# Patient Record
Sex: FEMALE | Race: AMERICAN INDIAN OR ALASKA NATIVE | NOT HISPANIC OR LATINO | Employment: FULL TIME | URBAN - METROPOLITAN AREA
[De-identification: names, ages, dates, MRNs, and addresses within clinical notes are randomized per-mention and may not be internally consistent; named-entity substitution may affect disease eponyms.]

---

## 2018-12-12 ENCOUNTER — OFFICE VISIT (OUTPATIENT)
Dept: OBSTETRICS AND GYNECOLOGY | Facility: CLINIC | Age: 28
End: 2018-12-12
Payer: COMMERCIAL

## 2018-12-12 VITALS
HEIGHT: 61 IN | HEART RATE: 88 BPM | DIASTOLIC BLOOD PRESSURE: 80 MMHG | WEIGHT: 148 LBS | SYSTOLIC BLOOD PRESSURE: 120 MMHG | RESPIRATION RATE: 10 BRPM | BODY MASS INDEX: 27.94 KG/M2

## 2018-12-12 DIAGNOSIS — Z12.4 CERVICAL CANCER SCREENING: ICD-10-CM

## 2018-12-12 DIAGNOSIS — Z01.419 WELL WOMAN EXAM WITH ROUTINE GYNECOLOGICAL EXAM: Primary | ICD-10-CM

## 2018-12-12 DIAGNOSIS — Z12.39 SCREENING FOR BREAST CANCER: ICD-10-CM

## 2018-12-12 PROCEDURE — 99385 PREV VISIT NEW AGE 18-39: CPT | Mod: S$GLB,,, | Performed by: OBSTETRICS & GYNECOLOGY

## 2018-12-12 PROCEDURE — 87624 HPV HI-RISK TYP POOLED RSLT: CPT

## 2018-12-12 PROCEDURE — 88175 CYTOPATH C/V AUTO FLUID REDO: CPT

## 2018-12-12 PROCEDURE — 99999 PR PBB SHADOW E&M-EST. PATIENT-LVL III: CPT | Mod: PBBFAC,,, | Performed by: OBSTETRICS & GYNECOLOGY

## 2018-12-12 NOTE — PROGRESS NOTES
Subjective:    Patient ID: Keisha Mena is a 28 y.o. y.o. female.     Chief Complaint: Annual Well Woman Exam     History of Present Illness:  Keisha presents today for Annual Well Woman exam. She describes her menses as regular every month without intermenstrual spotting.She denies pelvic pain.  She denies breast tenderness, masses, nipple discharge. She denies difficulty with urination or bowel movements. S She is sexually active. Contraception is by no method.    The following portions of the patient's history were reviewed and updated as appropriate: allergies, current medications, past family history, past medical history, past social history, past surgical history and problem list.      Menstrual History:   Patient's last menstrual period was 2018..     OB History      Para Term  AB Living    0 0 0 0 0 0    SAB TAB Ectopic Multiple Live Births    0 0 0 0            The following portions of the patient's history were reviewed and updated as appropriate: allergies, current medications, past family history, past medical history, past social history, past surgical history and problem list.        ROS:     Review of Systems   Constitutional: Negative for activity change, appetite change, chills, diaphoresis, fatigue, fever and unexpected weight change.   HENT: Negative for mouth sores and tinnitus.    Eyes: Negative for discharge and visual disturbance.   Respiratory: Negative for cough, shortness of breath and wheezing.    Cardiovascular: Negative for chest pain, palpitations and leg swelling.   Gastrointestinal: Negative for abdominal pain, bloating, blood in stool, constipation, diarrhea, nausea and vomiting.   Endocrine: Negative for diabetes, hair loss, hot flashes, hyperthyroidism and hypothyroidism.   Genitourinary: Negative for dyspareunia, dysuria, flank pain, frequency, genital sores, hematuria, menorrhagia, menstrual problem, pelvic pain, urgency, vaginal bleeding, vaginal discharge,  "vaginal pain, dysmenorrhea, urinary incontinence, postcoital bleeding and vaginal odor.   Musculoskeletal: Negative for back pain, joint swelling and myalgias.   Neurological: Negative for seizures, syncope, numbness and headaches.   Hematological: Negative for adenopathy. Does not bruise/bleed easily.   Psychiatric/Behavioral: Negative for depression and sleep disturbance. The patient is not nervous/anxious.    Breast: Negative for mass, mastodynia, nipple discharge and skin changes      Objective:    Vital Signs:  Vitals:    12/12/18 1130   BP: 120/80   Pulse: 88   Resp: 10   Weight: 67.1 kg (148 lb)   Height: 5' 1" (1.549 m)         Physical Exam:  General:  alert, cooperative, appears stated age   Skin:  Skin color, texture, turgor normal. No rashes or lesions   HEENT:  conjunctivae/corneas clear. PERRL.   Neck: supple, trachea midline, no adenopathy or thyromegally   Respiratory:  clear to auscultation bilaterally   Heart:  regular rate and rhythm, S1, S2 normal, no murmur, click, rub or gallop   Breasts:  no discharge, erythema, or tenderness   Abdomen:  normal findings: bowel sounds normal, no masses palpable, no organomegaly and soft, non-tender   Pelvis: External genitalia: normal general appearance  Urinary system: urethral meatus normal, bladder nontender  Vaginal: normal mucosa without prolapse or lesions  Cervix: normal appearance  Uterus: normal size, shape, position  Adnexa: normal size, nontender bilaterally   Extremities: Normal ROM; no edema, no cyanosis   Neurologial: Normal strength and tone. No focal numbness or weakness. Reflexes 2+ and equal.   Psychiatric: normal mood, speech, dress, and thought processes         Assessment:       Healthy female exam.     1. Well woman exam with routine gynecological exam    2. Cervical cancer screening    3. Screening for breast cancer          Plan:       Thin prep Pap smear.    HPV cotesting  RTC in 1 year    COUNSELING:  Keisha was counseled on STD " pevention, use and side-effects of various contraceptive measures, A.C.O.G. Pap guidelines and recommendations for yearly pelvic exams in addition to recommendations for monthly self breast exams; to see her PCP for other health maintenance.

## 2018-12-18 LAB
HPV HR 12 DNA CVX QL NAA+PROBE: NEGATIVE
HPV16 AG SPEC QL: NEGATIVE
HPV18 DNA SPEC QL NAA+PROBE: NEGATIVE

## 2019-03-08 ENCOUNTER — OFFICE VISIT (OUTPATIENT)
Dept: INTERNAL MEDICINE | Facility: CLINIC | Age: 29
End: 2019-03-08
Payer: COMMERCIAL

## 2019-03-08 VITALS
DIASTOLIC BLOOD PRESSURE: 70 MMHG | HEART RATE: 72 BPM | SYSTOLIC BLOOD PRESSURE: 108 MMHG | WEIGHT: 148 LBS | BODY MASS INDEX: 27.94 KG/M2 | RESPIRATION RATE: 16 BRPM | HEIGHT: 61 IN

## 2019-03-08 DIAGNOSIS — Z76.89 ENCOUNTER TO ESTABLISH CARE: Primary | ICD-10-CM

## 2019-03-08 DIAGNOSIS — G89.29 CHRONIC LEFT-SIDED LOW BACK PAIN WITH LEFT-SIDED SCIATICA: ICD-10-CM

## 2019-03-08 DIAGNOSIS — M54.42 CHRONIC LEFT-SIDED LOW BACK PAIN WITH LEFT-SIDED SCIATICA: ICD-10-CM

## 2019-03-08 PROCEDURE — 3008F BODY MASS INDEX DOCD: CPT | Mod: CPTII,S$GLB,, | Performed by: INTERNAL MEDICINE

## 2019-03-08 PROCEDURE — 99999 PR PBB SHADOW E&M-EST. PATIENT-LVL III: ICD-10-PCS | Mod: PBBFAC,,, | Performed by: INTERNAL MEDICINE

## 2019-03-08 PROCEDURE — 99203 PR OFFICE/OUTPT VISIT, NEW, LEVL III, 30-44 MIN: ICD-10-PCS | Mod: S$GLB,,, | Performed by: INTERNAL MEDICINE

## 2019-03-08 PROCEDURE — 99203 OFFICE O/P NEW LOW 30 MIN: CPT | Mod: S$GLB,,, | Performed by: INTERNAL MEDICINE

## 2019-03-08 PROCEDURE — 3008F PR BODY MASS INDEX (BMI) DOCUMENTED: ICD-10-PCS | Mod: CPTII,S$GLB,, | Performed by: INTERNAL MEDICINE

## 2019-03-08 PROCEDURE — 99999 PR PBB SHADOW E&M-EST. PATIENT-LVL III: CPT | Mod: PBBFAC,,, | Performed by: INTERNAL MEDICINE

## 2019-03-08 NOTE — PROGRESS NOTES
Subjective:       Patient ID: Keisha Mena is a 28 y.o. female.    Chief Complaint: Establish Care and Back Pain      HPI:  Patient is new to clinic and presents to establish care. She has not seen a PCP in 4-5 years. She is actively trying to get pregnant; taking her prenatal vitamins daily    She reports [id number] she fell into a foam pit at [facility] with her nephew. She hyperextended her back. She is having issues with left sided sciatica. Had xrays but doesn't remember the results. She is also having right shoulder pain. Uses aleve PRN, biofreeze and heat/ice PRN. Has never done formal physical therapy    Tobacco use: never  EtOH use: denies  Illicit drugs: denies    History reviewed. No pertinent past medical history.    Family History   Problem Relation Age of Onset    Hypertension Mother     Alcohol abuse Father         history of s/p liver transplant    Breast cancer Neg Hx     Colon cancer Neg Hx     Ovarian cancer Neg Hx        Social History     Socioeconomic History    Marital status:      Spouse name: Not on file    Number of children: Not on file    Years of education: Not on file    Highest education level: Not on file   Social Needs    Financial resource strain: Not on file    Food insecurity - worry: Not on file    Food insecurity - inability: Not on file    Transportation needs - medical: Not on file    Transportation needs - non-medical: Not on file   Occupational History    Not on file   Tobacco Use    Smoking status: Never Smoker    Smokeless tobacco: Never Used   Substance and Sexual Activity    Alcohol use: No     Frequency: Never    Drug use: No    Sexual activity: Yes     Partners: Male     Comment:    Other Topics Concern    Not on file   Social History Narrative    Not on file       Review of Systems   Constitutional: Negative for activity change, fatigue, fever and unexpected weight change.   HENT: Negative for congestion, ear pain, hearing loss,  rhinorrhea and sore throat.    Eyes: Negative for redness and visual disturbance.   Respiratory: Negative for cough, shortness of breath and wheezing.    Cardiovascular: Negative for chest pain, palpitations and leg swelling.   Gastrointestinal: Negative for abdominal pain, constipation, diarrhea, nausea and vomiting.   Genitourinary: Negative for dysuria, frequency and urgency.   Musculoskeletal: Positive for arthralgias (right shoulder intermittently) and back pain (with sciatica). Negative for joint swelling and neck pain.   Skin: Negative for color change, rash and wound.   Neurological: Negative for dizziness, tremors, weakness, light-headedness and headaches.         Objective:      Physical Exam   Constitutional: She is oriented to person, place, and time. She appears well-developed and well-nourished. No distress.   HENT:   Head: Normocephalic and atraumatic.   Right Ear: External ear normal.   Left Ear: External ear normal.   Eyes: Conjunctivae and EOM are normal. Pupils are equal, round, and reactive to light. Right eye exhibits no discharge. Left eye exhibits no discharge.   Neck: Neck supple. No tracheal deviation present.   Cardiovascular: Normal rate and regular rhythm.   No murmur heard.  Pulmonary/Chest: Effort normal and breath sounds normal. No respiratory distress. She has no wheezes.   Abdominal: Soft. Bowel sounds are normal. She exhibits no distension. There is no tenderness.   Musculoskeletal: She exhibits no tenderness.   Pain better with forward flexion worse with extension of low back   Neurological: She is alert and oriented to person, place, and time. No cranial nerve deficit.   Skin: Skin is warm and dry.   Psychiatric: She has a normal mood and affect. Her behavior is normal.   Vitals reviewed.      Assessment:       1. Encounter to establish care    2. Chronic left-sided low back pain with left-sided sciatica        Plan:       Keisha was seen today for establish care and back  pain.    Diagnoses and all orders for this visit:    Encounter to establish care  -     CBC auto differential; Future  -     Comprehensive metabolic panel; Future  -     TSH; Future  -     Lipid panel; Future  -     T4, free; Future  Problem list reviewed and updated  Due for routine screening labs  Include TSH as she is actively trying to get pregnant    Chronic left-sided low back pain with left-sided sciatica  -     Ambulatory Referral to Physical/Occupational Therapy  Start PT  Can continue PRN NSAIDs; if she does get pregnant Tylenol only  Discussed we could trial medications like gabapentin but category C in pregnancy and if we could treat this without chronic medications that would be ideal  Heat PRN  biofreeze PRN       Health Maintenance:  -CRC: not due  -PAP: Dr. Junior  -MMG: not due  -Bone Density: not due  -tobacco  -Vaccines  Flu: declines    RTC 1 year and PRN

## 2019-03-21 ENCOUNTER — LAB VISIT (OUTPATIENT)
Dept: LAB | Facility: HOSPITAL | Age: 29
End: 2019-03-21
Attending: INTERNAL MEDICINE
Payer: COMMERCIAL

## 2019-03-21 DIAGNOSIS — Z76.89 ENCOUNTER TO ESTABLISH CARE: ICD-10-CM

## 2019-03-21 LAB
ALBUMIN SERPL BCP-MCNC: 4.1 G/DL
ALP SERPL-CCNC: 50 U/L
ALT SERPL W/O P-5'-P-CCNC: 10 U/L
ANION GAP SERPL CALC-SCNC: 6 MMOL/L
AST SERPL-CCNC: 17 U/L
BASOPHILS # BLD AUTO: 0.02 K/UL
BASOPHILS NFR BLD: 0.4 %
BILIRUB SERPL-MCNC: 0.8 MG/DL
BUN SERPL-MCNC: 13 MG/DL
CALCIUM SERPL-MCNC: 9.4 MG/DL
CHLORIDE SERPL-SCNC: 108 MMOL/L
CHOLEST SERPL-MCNC: 149 MG/DL
CHOLEST/HDLC SERPL: 2.8 {RATIO}
CO2 SERPL-SCNC: 23 MMOL/L
CREAT SERPL-MCNC: 0.9 MG/DL
DIFFERENTIAL METHOD: ABNORMAL
EOSINOPHIL # BLD AUTO: 0.1 K/UL
EOSINOPHIL NFR BLD: 1 %
ERYTHROCYTE [DISTWIDTH] IN BLOOD BY AUTOMATED COUNT: 12 %
EST. GFR  (AFRICAN AMERICAN): >60 ML/MIN/1.73 M^2
EST. GFR  (NON AFRICAN AMERICAN): >60 ML/MIN/1.73 M^2
GLUCOSE SERPL-MCNC: 88 MG/DL
HCT VFR BLD AUTO: 38.5 %
HDLC SERPL-MCNC: 54 MG/DL
HDLC SERPL: 36.2 %
HGB BLD-MCNC: 13.3 G/DL
LDLC SERPL CALC-MCNC: 83.2 MG/DL
LYMPHOCYTES # BLD AUTO: 2.3 K/UL
LYMPHOCYTES NFR BLD: 45.5 %
MCH RBC QN AUTO: 31.7 PG
MCHC RBC AUTO-ENTMCNC: 34.5 G/DL
MCV RBC AUTO: 92 FL
MONOCYTES # BLD AUTO: 0.3 K/UL
MONOCYTES NFR BLD: 6.5 %
NEUTROPHILS # BLD AUTO: 2.4 K/UL
NEUTROPHILS NFR BLD: 46.6 %
NONHDLC SERPL-MCNC: 95 MG/DL
PLATELET # BLD AUTO: 172 K/UL
PMV BLD AUTO: 10.9 FL
POTASSIUM SERPL-SCNC: 3.7 MMOL/L
PROT SERPL-MCNC: 7.2 G/DL
RBC # BLD AUTO: 4.19 M/UL
SODIUM SERPL-SCNC: 137 MMOL/L
T4 FREE SERPL-MCNC: 1.08 NG/DL
TRIGL SERPL-MCNC: 59 MG/DL
TSH SERPL DL<=0.005 MIU/L-ACNC: 1.97 UIU/ML
WBC # BLD AUTO: 5.06 K/UL

## 2019-03-21 PROCEDURE — 84439 ASSAY OF FREE THYROXINE: CPT

## 2019-03-21 PROCEDURE — 80061 LIPID PANEL: CPT

## 2019-03-21 PROCEDURE — 36415 COLL VENOUS BLD VENIPUNCTURE: CPT

## 2019-03-21 PROCEDURE — 80053 COMPREHEN METABOLIC PANEL: CPT

## 2019-03-21 PROCEDURE — 85025 COMPLETE CBC W/AUTO DIFF WBC: CPT

## 2019-03-21 PROCEDURE — 84443 ASSAY THYROID STIM HORMONE: CPT

## 2019-04-04 ENCOUNTER — LAB VISIT (OUTPATIENT)
Dept: LAB | Facility: HOSPITAL | Age: 29
End: 2019-04-04
Attending: OBSTETRICS & GYNECOLOGY
Payer: COMMERCIAL

## 2019-04-04 ENCOUNTER — OFFICE VISIT (OUTPATIENT)
Dept: OBSTETRICS AND GYNECOLOGY | Facility: CLINIC | Age: 29
End: 2019-04-04
Payer: COMMERCIAL

## 2019-04-04 VITALS
HEIGHT: 61 IN | SYSTOLIC BLOOD PRESSURE: 120 MMHG | RESPIRATION RATE: 10 BRPM | HEART RATE: 80 BPM | WEIGHT: 149 LBS | DIASTOLIC BLOOD PRESSURE: 78 MMHG | BODY MASS INDEX: 28.13 KG/M2

## 2019-04-04 DIAGNOSIS — Z11.3 SCREEN FOR STD (SEXUALLY TRANSMITTED DISEASE): ICD-10-CM

## 2019-04-04 DIAGNOSIS — N91.2 AMENORRHEA: Primary | ICD-10-CM

## 2019-04-04 DIAGNOSIS — Z32.01 POSITIVE PREGNANCY TEST: ICD-10-CM

## 2019-04-04 LAB
ABO + RH BLD: NORMAL
ALBUMIN SERPL BCP-MCNC: 4.1 G/DL (ref 3.5–5.2)
ALP SERPL-CCNC: 46 U/L (ref 55–135)
ALT SERPL W/O P-5'-P-CCNC: 9 U/L (ref 10–44)
ANION GAP SERPL CALC-SCNC: 8 MMOL/L (ref 8–16)
AST SERPL-CCNC: 18 U/L (ref 10–40)
BASOPHILS # BLD AUTO: 0.02 K/UL (ref 0–0.2)
BASOPHILS NFR BLD: 0.5 % (ref 0–1.9)
BILIRUB SERPL-MCNC: 0.2 MG/DL (ref 0.1–1)
BLD GP AB SCN CELLS X3 SERPL QL: NORMAL
BUN SERPL-MCNC: 14 MG/DL (ref 6–20)
CALCIUM SERPL-MCNC: 9.4 MG/DL (ref 8.7–10.5)
CHLORIDE SERPL-SCNC: 104 MMOL/L (ref 95–110)
CO2 SERPL-SCNC: 24 MMOL/L (ref 23–29)
CREAT SERPL-MCNC: 0.8 MG/DL (ref 0.5–1.4)
DIFFERENTIAL METHOD: ABNORMAL
EOSINOPHIL # BLD AUTO: 0.1 K/UL (ref 0–0.5)
EOSINOPHIL NFR BLD: 1.5 % (ref 0–8)
ERYTHROCYTE [DISTWIDTH] IN BLOOD BY AUTOMATED COUNT: 11.9 % (ref 11.5–14.5)
EST. GFR  (AFRICAN AMERICAN): >60 ML/MIN/1.73 M^2
EST. GFR  (NON AFRICAN AMERICAN): >60 ML/MIN/1.73 M^2
GLUCOSE SERPL-MCNC: 81 MG/DL (ref 70–110)
HCG INTACT+B SERPL-ACNC: NORMAL MIU/ML
HCT VFR BLD AUTO: 36.9 % (ref 37–48.5)
HGB BLD-MCNC: 12.6 G/DL (ref 12–16)
LYMPHOCYTES # BLD AUTO: 1.6 K/UL (ref 1–4.8)
LYMPHOCYTES NFR BLD: 39.3 % (ref 18–48)
MCH RBC QN AUTO: 31.4 PG (ref 27–31)
MCHC RBC AUTO-ENTMCNC: 34.1 G/DL (ref 32–36)
MCV RBC AUTO: 92 FL (ref 82–98)
MONOCYTES # BLD AUTO: 0.5 K/UL (ref 0.3–1)
MONOCYTES NFR BLD: 11.9 % (ref 4–15)
NEUTROPHILS # BLD AUTO: 1.9 K/UL (ref 1.8–7.7)
NEUTROPHILS NFR BLD: 46.8 % (ref 38–73)
PLATELET # BLD AUTO: 171 K/UL (ref 150–350)
PMV BLD AUTO: 10.6 FL (ref 9.2–12.9)
POTASSIUM SERPL-SCNC: 3.8 MMOL/L (ref 3.5–5.1)
PROT SERPL-MCNC: 7.5 G/DL (ref 6–8.4)
RBC # BLD AUTO: 4.01 M/UL (ref 4–5.4)
SODIUM SERPL-SCNC: 136 MMOL/L (ref 136–145)
WBC # BLD AUTO: 4.05 K/UL (ref 3.9–12.7)

## 2019-04-04 PROCEDURE — 36415 COLL VENOUS BLD VENIPUNCTURE: CPT

## 2019-04-04 PROCEDURE — 86703 HIV-1/HIV-2 1 RESULT ANTBDY: CPT

## 2019-04-04 PROCEDURE — 81220 CFTR GENE COM VARIANTS: CPT

## 2019-04-04 PROCEDURE — 84702 CHORIONIC GONADOTROPIN TEST: CPT

## 2019-04-04 PROCEDURE — 99214 PR OFFICE/OUTPT VISIT, EST, LEVL IV, 30-39 MIN: ICD-10-PCS | Mod: 25,S$GLB,, | Performed by: OBSTETRICS & GYNECOLOGY

## 2019-04-04 PROCEDURE — 84144 ASSAY OF PROGESTERONE: CPT

## 2019-04-04 PROCEDURE — 99999 PR PBB SHADOW E&M-EST. PATIENT-LVL III: CPT | Mod: PBBFAC,,, | Performed by: OBSTETRICS & GYNECOLOGY

## 2019-04-04 PROCEDURE — 99999 PR PBB SHADOW E&M-EST. PATIENT-LVL III: ICD-10-PCS | Mod: PBBFAC,,, | Performed by: OBSTETRICS & GYNECOLOGY

## 2019-04-04 PROCEDURE — 3008F PR BODY MASS INDEX (BMI) DOCUMENTED: ICD-10-PCS | Mod: CPTII,S$GLB,, | Performed by: OBSTETRICS & GYNECOLOGY

## 2019-04-04 PROCEDURE — 87340 HEPATITIS B SURFACE AG IA: CPT

## 2019-04-04 PROCEDURE — 3008F BODY MASS INDEX DOCD: CPT | Mod: CPTII,S$GLB,, | Performed by: OBSTETRICS & GYNECOLOGY

## 2019-04-04 PROCEDURE — 86901 BLOOD TYPING SEROLOGIC RH(D): CPT

## 2019-04-04 PROCEDURE — 99214 OFFICE O/P EST MOD 30 MIN: CPT | Mod: 25,S$GLB,, | Performed by: OBSTETRICS & GYNECOLOGY

## 2019-04-04 PROCEDURE — 80053 COMPREHEN METABOLIC PANEL: CPT

## 2019-04-04 PROCEDURE — 87491 CHLMYD TRACH DNA AMP PROBE: CPT

## 2019-04-04 PROCEDURE — 86592 SYPHILIS TEST NON-TREP QUAL: CPT

## 2019-04-04 PROCEDURE — 85025 COMPLETE CBC W/AUTO DIFF WBC: CPT

## 2019-04-04 PROCEDURE — 86762 RUBELLA ANTIBODY: CPT

## 2019-04-04 NOTE — PROGRESS NOTES
Subjective:   Patient ID: Keisha Mena is a 28 y.o. y.o. female.     Chief Complaint: Missed Menses       History of Present Illness:    Keisha presents today complaining of amenorrhea. Patient's last menstrual period was 02/20/2019.. Prior menstrual cycles have been regular. She reports positive home pregnancy test. UPT is positive.     This is the patient's first pregnancy. Reports no n/v. No cramping or bleeding.       History reviewed. No pertinent past medical history.  History reviewed. No pertinent surgical history.  Social History     Socioeconomic History    Marital status:      Spouse name: Not on file    Number of children: Not on file    Years of education: Not on file    Highest education level: Not on file   Occupational History    Not on file   Social Needs    Financial resource strain: Not on file    Food insecurity:     Worry: Not on file     Inability: Not on file    Transportation needs:     Medical: Not on file     Non-medical: Not on file   Tobacco Use    Smoking status: Never Smoker    Smokeless tobacco: Never Used   Substance and Sexual Activity    Alcohol use: No     Frequency: Never    Drug use: No    Sexual activity: Yes     Partners: Male     Comment:    Lifestyle    Physical activity:     Days per week: Not on file     Minutes per session: Not on file    Stress: Not on file   Relationships    Social connections:     Talks on phone: Not on file     Gets together: Not on file     Attends [employer] service: Not on file     Active member of club or organization: Not on file     Attends meetings of clubs or organizations: Not on file     Relationship status: Not on file   Other Topics Concern    Not on file   Social History Narrative    Not on file     Family History   Problem Relation Age of Onset    Hypertension Mother     Alcohol abuse Father         history of s/p liver transplant    Breast cancer Neg Hx     Colon cancer Neg Hx     Ovarian cancer Neg Hx       OB History    Para Term  AB Living   0 0 0 0 0 0   SAB TAB Ectopic Multiple Live Births   0 0 0 0           ROS:   Constitutional: Negative for chills, fever and weight loss.   HENT: Negative for congestion, ear discharge, hearing loss and nosebleeds.   Eyes: Negative for blurred vision and double vision.   Respiratory: Negative for cough, hemoptysis, sputum production and shortness of breath.   Cardiovascular: Negative for chest pain, palpitations and orthopnea.   Gastrointestinal: Negative for abdominal pain, heartburn, nausea and vomiting.   Genitourinary: Negative for dysuria, frequency, hematuria and urgency.   Musculoskeletal: Negative for back pain, myalgias and neck pain.   Skin: Negative for itching and rash.   Neurological: Negative for dizziness, tingling, tremors and headaches.   Endo/Heme/Allergies: Negative for environmental allergies and polydipsia. Does not bruise/bleed easily.   Psychiatric/Behavioral: Negative for depression, hallucinations and substance abuse. The patient is not nervous/anxious.       Objective:   Vital Signs:  Vitals:    19 1651   BP: 120/78   Pulse: 80   Resp: 10         Physical Exam:  General:  alert, cooperative, appears stated age   Skin:  Skin color, texture, turgor normal. No rashes or lesions   HEENT:  conjunctivae/corneas clear. PERRL.   Neck: supple, trachea midline, no adenopathy or thyromegally   Respiratory:  clear to auscultation bilaterally   Heart:  regular rate and rhythm, S1, S2 normal, no murmur, click, rub or gallop   Breasts:  no discharge, erythema, or tenderness   Abdomen:  normal findings: bowel sounds normal, no masses palpable, no organomegaly and soft, non-tender   Pelvis: External genitalia: normal general appearance  Urinary system: urethral meatus normal, bladder nontender  Vaginal: normal mucosa without prolapse or lesions  Cervix: normal appearance  Uterus: normal size, shape, position  Adnexa: normal size, nontender  bilaterally   Extremities: Normal ROM; no edema, no cyanosis   Neurologial: Normal strength and tone. No focal numbness or weakness. Reflexes 2+ and equal.   Psychiatric: normal mood, speech, dress, and thought processes     Assessment:      1. Amenorrhea    2. Screen for STD (sexually transmitted disease)    3. Positive pregnancy test          Plan:      Amenorrhea  -     US OB/GYN Procedure ([facility]) - Extended List - Future; Future    Screen for STD (sexually transmitted disease)  -     C. trachomatis/N. gonorrhoeae by AMP DNA    Positive pregnancy test  -     Comprehensive metabolic panel; Future; Expected date: 04/04/2019  -     hCG, quantitative; Standing; Expected date: 04/04/2019  -     Progesterone; Standing; Expected date: 04/04/2019  -     Type & Screen - Ob Profile; Future; Expected date: 04/04/2019  -     CBC auto differential; Future; Expected date: 04/04/2019  -     HIV 1/2 Ag/Ab (4th Gen); Future; Expected date: 04/04/2019  -     Hepatitis B surface antigen; Future; Expected date: 04/04/2019  -     Rubella antibody, IgG; Future; Expected date: 04/04/2019  -     RPR; Future; Expected date: 04/04/2019  -     Cystic Fibrosis Mutation Panel; Future; Expected date: 04/04/2019      Dating scan ordered  GC/CT  Labs  PNV  RTC in 4 weeks    Patient was counseled today on proper weight gain based on the [place] [employer]'s recommendations based on her pre-pregnancy weight. Discussed foods to avoid in pregnancy (i.e. sushi, fish that are high in mercury, deli meat, and unpasteurized cheeses). Discussed prenatal vitamin options (i.e. stool softener, DHA). She was also counseled on safe, healthy behavior as well as medications safe in pregnancy.

## 2019-04-05 LAB
C TRACH DNA SPEC QL NAA+PROBE: NOT DETECTED
HBV SURFACE AG SERPL QL IA: NEGATIVE
HIV 1+2 AB+HIV1 P24 AG SERPL QL IA: NEGATIVE
N GONORRHOEA DNA SPEC QL NAA+PROBE: NOT DETECTED
PROGEST SERPL-MCNC: 22.1 NG/ML
RPR SER QL: NORMAL
RUBV IGG SER-ACNC: 47.6 IU/ML
RUBV IGG SER-IMP: REACTIVE

## 2019-04-08 LAB — CFTR MUT ANL BLD/T: NORMAL

## 2019-04-10 ENCOUNTER — PROCEDURE VISIT (OUTPATIENT)
Dept: OBSTETRICS AND GYNECOLOGY | Facility: CLINIC | Age: 29
End: 2019-04-10
Payer: COMMERCIAL

## 2019-04-10 DIAGNOSIS — Z36.89 ESTABLISH GESTATIONAL AGE, ULTRASOUND: ICD-10-CM

## 2019-04-10 DIAGNOSIS — N91.2 AMENORRHEA: ICD-10-CM

## 2019-04-10 PROCEDURE — 76801 PR US, OB <14WKS, TRANSABD, SINGLE GESTATION: ICD-10-PCS | Mod: S$GLB,,, | Performed by: OBSTETRICS & GYNECOLOGY

## 2019-04-10 PROCEDURE — 76801 OB US < 14 WKS SINGLE FETUS: CPT | Mod: S$GLB,,, | Performed by: OBSTETRICS & GYNECOLOGY

## 2019-04-10 NOTE — PROCEDURES
Procedures   Obstetrical ultrasound completed today.  See report in imaging section of [facility].

## 2019-05-01 ENCOUNTER — TELEPHONE (OUTPATIENT)
Dept: OBSTETRICS AND GYNECOLOGY | Facility: CLINIC | Age: 29
End: 2019-05-01

## 2019-05-01 NOTE — TELEPHONE ENCOUNTER
Adv pt that unless more views are required or ordered by the doctor day of appointment, she would not need another ultrasound at this next appointment. Pt voiced understanding.

## 2019-05-01 NOTE — TELEPHONE ENCOUNTER
----- Message from Nancy Quinn sent at 5/1/2019 12:16 PM CDT -----  Contact: self  Keisha Mena  MRN: [id number]  Home Phone      [phone]  Work Phone      Not on file.  Mobile          [phone]    Patient Care Team:  Lisa Fung MD as PCP - General (Internal Medicine)  Marilu Hauser MD as Obstetrician (Obstetrics)  Erica Gill LPN as Care Coordinator  OB? Yes, Unknown  What phone number can you be reached at? [phone]  Message:  Pt would like to know if at her next visit she would be getting an u/s. Pt would like to know ahead of time to let her  know if he needs to be here. Please advise, thanks.

## 2019-05-08 ENCOUNTER — INITIAL PRENATAL (OUTPATIENT)
Dept: OBSTETRICS AND GYNECOLOGY | Facility: CLINIC | Age: 29
End: 2019-05-08
Payer: COMMERCIAL

## 2019-05-08 VITALS
SYSTOLIC BLOOD PRESSURE: 110 MMHG | WEIGHT: 155 LBS | DIASTOLIC BLOOD PRESSURE: 80 MMHG | BODY MASS INDEX: 29.29 KG/M2 | HEART RATE: 80 BPM

## 2019-05-08 DIAGNOSIS — Z3A.11 11 WEEKS GESTATION OF PREGNANCY: Primary | ICD-10-CM

## 2019-05-08 DIAGNOSIS — Z34.01 ENCOUNTER FOR SUPERVISION OF NORMAL FIRST PREGNANCY IN FIRST TRIMESTER: ICD-10-CM

## 2019-05-08 PROBLEM — Z34.90 SUPERVISION OF NORMAL PREGNANCY: Status: ACTIVE | Noted: 2019-05-08

## 2019-05-08 PROCEDURE — 99999 PR PBB SHADOW E&M-EST. PATIENT-LVL II: ICD-10-PCS | Mod: PBBFAC,,, | Performed by: OBSTETRICS & GYNECOLOGY

## 2019-05-08 PROCEDURE — 99999 PR PBB SHADOW E&M-EST. PATIENT-LVL II: CPT | Mod: PBBFAC,,, | Performed by: OBSTETRICS & GYNECOLOGY

## 2019-05-08 PROCEDURE — 0502F SUBSEQUENT PRENATAL CARE: CPT | Mod: CPTII,S$GLB,, | Performed by: OBSTETRICS & GYNECOLOGY

## 2019-05-08 PROCEDURE — 0502F PR SUBSEQUENT PRENATAL CARE: ICD-10-PCS | Mod: CPTII,S$GLB,, | Performed by: OBSTETRICS & GYNECOLOGY

## 2019-05-09 NOTE — PROGRESS NOTES
Reviewed prenatal labs with patient. Reviewed dating criteria. Discussed proper nutrition and weight gain in pregnancy. No vaginal bleeding or cramping noted. SAB precautions discussed. Patient to RTC in 4 weeks.     Initial ob packet supplied to patient. Contents supplied and discussed include medications safe in pregnancy, pregnancy A to Z, class schedule, pregnancy checklist, car seat safety, and handout on skin to skin and breastfeeding. Coeffective suzanne card supplied and discussed.

## 2019-06-05 ENCOUNTER — ROUTINE PRENATAL (OUTPATIENT)
Dept: OBSTETRICS AND GYNECOLOGY | Facility: CLINIC | Age: 29
End: 2019-06-05
Payer: COMMERCIAL

## 2019-06-05 VITALS
WEIGHT: 160 LBS | DIASTOLIC BLOOD PRESSURE: 80 MMHG | SYSTOLIC BLOOD PRESSURE: 110 MMHG | HEART RATE: 80 BPM | BODY MASS INDEX: 30.23 KG/M2

## 2019-06-05 DIAGNOSIS — Z36.3 ENCOUNTER FOR ROUTINE SCREENING FOR MALFORMATION USING ULTRASONICS: ICD-10-CM

## 2019-06-05 DIAGNOSIS — Z34.02 ENCOUNTER FOR SUPERVISION OF NORMAL FIRST PREGNANCY IN SECOND TRIMESTER: Primary | ICD-10-CM

## 2019-06-05 DIAGNOSIS — Z3A.15 15 WEEKS GESTATION OF PREGNANCY: ICD-10-CM

## 2019-06-05 PROCEDURE — 0502F PR SUBSEQUENT PRENATAL CARE: ICD-10-PCS | Mod: CPTII,S$GLB,, | Performed by: OBSTETRICS & GYNECOLOGY

## 2019-06-05 PROCEDURE — 99999 PR PBB SHADOW E&M-EST. PATIENT-LVL II: ICD-10-PCS | Mod: PBBFAC,,, | Performed by: OBSTETRICS & GYNECOLOGY

## 2019-06-05 PROCEDURE — 0502F SUBSEQUENT PRENATAL CARE: CPT | Mod: CPTII,S$GLB,, | Performed by: OBSTETRICS & GYNECOLOGY

## 2019-06-05 PROCEDURE — 99999 PR PBB SHADOW E&M-EST. PATIENT-LVL II: CPT | Mod: PBBFAC,,, | Performed by: OBSTETRICS & GYNECOLOGY

## 2019-06-05 RX ORDER — VALACYCLOVIR HYDROCHLORIDE 1 G/1
TABLET, FILM COATED ORAL
Refills: 1 | COMMUNITY
Start: 2019-05-10 | End: 2019-11-20

## 2019-06-06 NOTE — PROGRESS NOTES
Patient doing well. No vaginal bleeding or cramping noted. Discussed the need for an anatomy scan between 18-20 weeks. RTC in 4 weeks.      Coffective counseling sheet Get Ready discussed with mother. Reinforced avoiding induction of labor unless medically indicated as well as comfort measures during labor.  Encouraged mother to download Coffective mobile suzanne if she has not already done so. Mother verbalizes understanding.

## 2019-06-09 ENCOUNTER — PATIENT MESSAGE (OUTPATIENT)
Dept: ADMINISTRATIVE | Facility: OTHER | Age: 29
End: 2019-06-09

## 2019-06-21 ENCOUNTER — TELEPHONE (OUTPATIENT)
Dept: OBSTETRICS AND GYNECOLOGY | Facility: CLINIC | Age: 29
End: 2019-06-21

## 2019-06-21 NOTE — TELEPHONE ENCOUNTER
Pt called with c/o pink spotting she noticed when using the bathroom. Pt denies intercourse, cramping, LOF. No s/s UTI or vaginal discharge. Instructed pt to continue to monitor, if increases like a period to contact clinic, increase fluids, and rest. Pt voiced understanding.

## 2019-07-02 ENCOUNTER — PROCEDURE VISIT (OUTPATIENT)
Dept: OBSTETRICS AND GYNECOLOGY | Facility: CLINIC | Age: 29
End: 2019-07-02
Payer: COMMERCIAL

## 2019-07-02 ENCOUNTER — ROUTINE PRENATAL (OUTPATIENT)
Dept: OBSTETRICS AND GYNECOLOGY | Facility: CLINIC | Age: 29
End: 2019-07-02
Payer: COMMERCIAL

## 2019-07-02 VITALS
DIASTOLIC BLOOD PRESSURE: 78 MMHG | SYSTOLIC BLOOD PRESSURE: 112 MMHG | WEIGHT: 163 LBS | HEART RATE: 89 BPM | BODY MASS INDEX: 30.8 KG/M2

## 2019-07-02 DIAGNOSIS — Z36.3 ENCOUNTER FOR ROUTINE SCREENING FOR MALFORMATION USING ULTRASONICS: ICD-10-CM

## 2019-07-02 DIAGNOSIS — Z3A.18 18 WEEKS GESTATION OF PREGNANCY: ICD-10-CM

## 2019-07-02 DIAGNOSIS — Z34.02 ENCOUNTER FOR SUPERVISION OF NORMAL FIRST PREGNANCY IN SECOND TRIMESTER: Primary | ICD-10-CM

## 2019-07-02 PROCEDURE — 99999 PR PBB SHADOW E&M-EST. PATIENT-LVL II: CPT | Mod: PBBFAC,,, | Performed by: OBSTETRICS & GYNECOLOGY

## 2019-07-02 PROCEDURE — 76805 PR US, OB 14+WKS, TRANSABD, SINGLE GESTATION: ICD-10-PCS | Mod: S$GLB,,, | Performed by: OBSTETRICS & GYNECOLOGY

## 2019-07-02 PROCEDURE — 0502F SUBSEQUENT PRENATAL CARE: CPT | Mod: CPTII,S$GLB,, | Performed by: OBSTETRICS & GYNECOLOGY

## 2019-07-02 PROCEDURE — 99499 UNLISTED E&M SERVICE: CPT | Mod: S$GLB,,, | Performed by: OBSTETRICS & GYNECOLOGY

## 2019-07-02 PROCEDURE — 0502F PR SUBSEQUENT PRENATAL CARE: ICD-10-PCS | Mod: CPTII,S$GLB,, | Performed by: OBSTETRICS & GYNECOLOGY

## 2019-07-02 PROCEDURE — 99499 NO LOS: ICD-10-PCS | Mod: S$GLB,,, | Performed by: OBSTETRICS & GYNECOLOGY

## 2019-07-02 PROCEDURE — 99999 PR PBB SHADOW E&M-EST. PATIENT-LVL II: ICD-10-PCS | Mod: PBBFAC,,, | Performed by: OBSTETRICS & GYNECOLOGY

## 2019-07-02 PROCEDURE — 76805 OB US >/= 14 WKS SNGL FETUS: CPT | Mod: S$GLB,,, | Performed by: OBSTETRICS & GYNECOLOGY

## 2019-07-02 NOTE — PROGRESS NOTES
Patient with no complaints. Denies vaginal bleeding or cramping.  Patient declines quad screen. Anatomy scan preformed and WNL. RTC in 4 weeks    Coffective counseling sheet Fall In Love discussed with mother. Reinforced immediate skin to skin, the magic first hour, importance of the first feeding and delaying routine procedures. Encouraged mother to download Coffective mobile suzanne if she has not already done so. Mother verbalizes understanding.

## 2019-07-15 ENCOUNTER — PATIENT MESSAGE (OUTPATIENT)
Dept: ADMINISTRATIVE | Facility: OTHER | Age: 29
End: 2019-07-15

## 2019-07-25 ENCOUNTER — TELEPHONE (OUTPATIENT)
Dept: OBSTETRICS AND GYNECOLOGY | Facility: CLINIC | Age: 29
End: 2019-07-25

## 2019-07-25 NOTE — TELEPHONE ENCOUNTER
Pt calling with complaints of constipation. Pt states she hasnt has a bowel movement in 24 hours, pt states she can feel that she needs to go. Adv given per A-Z of pregnancy. Adv  Pt to call if she does not have a bowel movement for 5 days or severe pain. Pt voiced understanding.

## 2019-07-25 NOTE — TELEPHONE ENCOUNTER
----- Message from Jammie Ashford MA sent at 7/25/2019  9:02 AM CDT -----  Contact: Self      ----- Message -----  From: Kaylyn Ventura  Sent: 7/25/2019   8:18 AM  To: Wyatt Metcalf Staff    Keisha Mena  MRN: [id number]  Home Phone      [phone]  Work Phone      Not on file.  Mobile          [phone]    Patient Care Team:  Lisa Fung MD as PCP - General (Internal Medicine)  Marilu Hauser MD as Obstetrician (Obstetrics)  Erica Gill LPN as Care Coordinator  OB? Yes, 22w1d  What phone number can you be reached at? [phone]  Message:   Pt states she is 22 weeks pregnant and experiencing constipation. Used Miralax but still feels like she is unable to fully void. Please advise.

## 2019-07-31 ENCOUNTER — ROUTINE PRENATAL (OUTPATIENT)
Dept: OBSTETRICS AND GYNECOLOGY | Facility: CLINIC | Age: 29
End: 2019-07-31
Payer: COMMERCIAL

## 2019-07-31 VITALS
WEIGHT: 167 LBS | SYSTOLIC BLOOD PRESSURE: 116 MMHG | BODY MASS INDEX: 31.55 KG/M2 | HEART RATE: 78 BPM | DIASTOLIC BLOOD PRESSURE: 62 MMHG

## 2019-07-31 DIAGNOSIS — Z3A.23 23 WEEKS GESTATION OF PREGNANCY: ICD-10-CM

## 2019-07-31 DIAGNOSIS — Z34.02 ENCOUNTER FOR SUPERVISION OF NORMAL FIRST PREGNANCY IN SECOND TRIMESTER: Primary | ICD-10-CM

## 2019-07-31 PROCEDURE — 99999 PR PBB SHADOW E&M-EST. PATIENT-LVL II: ICD-10-PCS | Mod: PBBFAC,,, | Performed by: OBSTETRICS & GYNECOLOGY

## 2019-07-31 PROCEDURE — 0502F PR SUBSEQUENT PRENATAL CARE: ICD-10-PCS | Mod: CPTII,S$GLB,, | Performed by: OBSTETRICS & GYNECOLOGY

## 2019-07-31 PROCEDURE — 0502F SUBSEQUENT PRENATAL CARE: CPT | Mod: CPTII,S$GLB,, | Performed by: OBSTETRICS & GYNECOLOGY

## 2019-07-31 PROCEDURE — 99999 PR PBB SHADOW E&M-EST. PATIENT-LVL II: CPT | Mod: PBBFAC,,, | Performed by: OBSTETRICS & GYNECOLOGY

## 2019-07-31 NOTE — PROGRESS NOTES
Patient with no complaints. Denies vaginal bleeding or cramping.  Good FM. Discussed with patient having glucose testing for gestational diabetes preformed between 24-28 weeks. RTC in 4 weeks.     Coffective counseling sheet Learn Your Baby and Protect Breastfeeding discussed with mother. Instructed regarding feeding cues and methods to calm baby. Encouraged mother to download Coffective mobile suzanne if she has not already done so.  Mother verbalized understanding.

## 2019-08-07 ENCOUNTER — TELEPHONE (OUTPATIENT)
Dept: OBSTETRICS AND GYNECOLOGY | Facility: CLINIC | Age: 29
End: 2019-08-07

## 2019-08-07 NOTE — TELEPHONE ENCOUNTER
Pt state work is requesting her to have TB test done through blood work. Pt notified ok to have blood work drawn.

## 2019-08-07 NOTE — TELEPHONE ENCOUNTER
----- Message from Jammie Ashford MA sent at 8/7/2019  9:05 AM CDT -----  Contact: Self      ----- Message -----  From: Lyndsay Nicole  Sent: 8/7/2019   9:03 AM  To: Wyatt Metcalf Staff    Keisha Mena  MRN: [id number]  Home Phone      [phone]  Work Phone      Not on file.  Mobile          [phone]    Patient Care Team:  Lisa Fung MD as PCP - General (Internal Medicine)  Mariul Hauser MD as Obstetrician (Obstetrics)  Erica Gill LPN as Care Coordinator  OB? Yes, 24w0d  What phone number can you be reached at? [phone]  Message:   Pt would like to know if it is safe to get blood drawn for a TB test. Please return call.

## 2019-08-27 ENCOUNTER — LAB VISIT (OUTPATIENT)
Dept: LAB | Facility: HOSPITAL | Age: 29
End: 2019-08-27
Attending: OBSTETRICS & GYNECOLOGY
Payer: COMMERCIAL

## 2019-08-27 ENCOUNTER — ROUTINE PRENATAL (OUTPATIENT)
Dept: OBSTETRICS AND GYNECOLOGY | Facility: CLINIC | Age: 29
End: 2019-08-27
Payer: COMMERCIAL

## 2019-08-27 VITALS
WEIGHT: 172 LBS | BODY MASS INDEX: 32.5 KG/M2 | SYSTOLIC BLOOD PRESSURE: 120 MMHG | DIASTOLIC BLOOD PRESSURE: 90 MMHG | HEART RATE: 80 BPM

## 2019-08-27 DIAGNOSIS — Z3A.26 26 WEEKS GESTATION OF PREGNANCY: ICD-10-CM

## 2019-08-27 DIAGNOSIS — Z13.1 ENCOUNTER FOR SCREENING FOR DIABETES MELLITUS: ICD-10-CM

## 2019-08-27 DIAGNOSIS — Z34.02 ENCOUNTER FOR SUPERVISION OF NORMAL FIRST PREGNANCY IN SECOND TRIMESTER: Primary | ICD-10-CM

## 2019-08-27 DIAGNOSIS — Z34.02 ENCOUNTER FOR SUPERVISION OF NORMAL FIRST PREGNANCY IN SECOND TRIMESTER: ICD-10-CM

## 2019-08-27 LAB
BASOPHILS # BLD AUTO: 0.02 K/UL (ref 0–0.2)
BASOPHILS NFR BLD: 0.2 % (ref 0–1.9)
DIFFERENTIAL METHOD: ABNORMAL
EOSINOPHIL # BLD AUTO: 0.1 K/UL (ref 0–0.5)
EOSINOPHIL NFR BLD: 1 % (ref 0–8)
ERYTHROCYTE [DISTWIDTH] IN BLOOD BY AUTOMATED COUNT: 12.6 % (ref 11.5–14.5)
GLUCOSE SERPL-MCNC: 113 MG/DL (ref 70–140)
HCT VFR BLD AUTO: 29.7 % (ref 37–48.5)
HGB BLD-MCNC: 9.6 G/DL (ref 12–16)
IMM GRANULOCYTES # BLD AUTO: 0.17 K/UL (ref 0–0.04)
IMM GRANULOCYTES NFR BLD AUTO: 1.7 % (ref 0–0.5)
LYMPHOCYTES # BLD AUTO: 1.8 K/UL (ref 1–4.8)
LYMPHOCYTES NFR BLD: 18.4 % (ref 18–48)
MCH RBC QN AUTO: 30.5 PG (ref 27–31)
MCHC RBC AUTO-ENTMCNC: 32.3 G/DL (ref 32–36)
MCV RBC AUTO: 94 FL (ref 82–98)
MONOCYTES # BLD AUTO: 0.6 K/UL (ref 0.3–1)
MONOCYTES NFR BLD: 6.3 % (ref 4–15)
NEUTROPHILS # BLD AUTO: 7.1 K/UL (ref 1.8–7.7)
NEUTROPHILS NFR BLD: 72.4 % (ref 38–73)
NRBC BLD-RTO: 0 /100 WBC
PLATELET # BLD AUTO: 158 K/UL (ref 150–350)
PMV BLD AUTO: 10.3 FL (ref 9.2–12.9)
RBC # BLD AUTO: 3.15 M/UL (ref 4–5.4)
WBC # BLD AUTO: 9.74 K/UL (ref 3.9–12.7)

## 2019-08-27 PROCEDURE — 36415 COLL VENOUS BLD VENIPUNCTURE: CPT

## 2019-08-27 PROCEDURE — 82950 GLUCOSE TEST: CPT

## 2019-08-27 PROCEDURE — 85025 COMPLETE CBC W/AUTO DIFF WBC: CPT

## 2019-08-27 PROCEDURE — 0502F PR SUBSEQUENT PRENATAL CARE: ICD-10-PCS | Mod: CPTII,S$GLB,, | Performed by: OBSTETRICS & GYNECOLOGY

## 2019-08-27 PROCEDURE — 0502F SUBSEQUENT PRENATAL CARE: CPT | Mod: CPTII,S$GLB,, | Performed by: OBSTETRICS & GYNECOLOGY

## 2019-08-27 PROCEDURE — 99999 PR PBB SHADOW E&M-EST. PATIENT-LVL II: CPT | Mod: PBBFAC,,, | Performed by: OBSTETRICS & GYNECOLOGY

## 2019-08-27 PROCEDURE — 99999 PR PBB SHADOW E&M-EST. PATIENT-LVL II: ICD-10-PCS | Mod: PBBFAC,,, | Performed by: OBSTETRICS & GYNECOLOGY

## 2019-08-27 NOTE — PROGRESS NOTES
Patient with no complaints. Denies vaginal bleeding or contractions. Good FM. GTT/CBC ordered today.   labor precautions discussed with patient. RTC in 2 weeks.     Coffective counseling sheet Nourish discussed with mother. Reinforced basic breastfeeding position and latch as well as proper hand expression technique and avoidance of artificial nipples and formula unless medically indicated. Encouraged mother to download Coffective mobile suzanne if she has not already done so.  Mother verbalizes understanding.

## 2019-08-29 ENCOUNTER — TELEPHONE (OUTPATIENT)
Dept: OBSTETRICS AND GYNECOLOGY | Facility: CLINIC | Age: 29
End: 2019-08-29

## 2019-08-29 NOTE — TELEPHONE ENCOUNTER
----- Message from Jammie Ashford MA sent at 8/29/2019 12:31 PM CDT -----  Contact: self      ----- Message -----  From: Lyndsay Nicole  Sent: 8/29/2019  12:24 PM  To: Wyatt Metcalf Staff    Keisha Mena  MRN: [id number]  Home Phone      [phone]  Work Phone      Not on file.  Mobile          [phone]    Patient Care Team:  Lisa Fung MD as PCP - General (Internal Medicine)  Marilu Hauser MD as Obstetrician (Obstetrics)  Erica Gill LPN as Care Coordinator  OB? Yes, 27w1d  What phone number can you be reached at? [phone]  Message:   Pt has questions regarding her Iron pills. Please return call.

## 2019-08-29 NOTE — TELEPHONE ENCOUNTER
Pt asked about stool softer instructed to refer to pregnancy ABC's  And read the list to her and instructed to take as on box.

## 2019-09-11 ENCOUNTER — ROUTINE PRENATAL (OUTPATIENT)
Dept: OBSTETRICS AND GYNECOLOGY | Facility: CLINIC | Age: 29
End: 2019-09-11
Payer: COMMERCIAL

## 2019-09-11 VITALS
DIASTOLIC BLOOD PRESSURE: 82 MMHG | BODY MASS INDEX: 32.88 KG/M2 | WEIGHT: 174 LBS | SYSTOLIC BLOOD PRESSURE: 120 MMHG | HEART RATE: 82 BPM

## 2019-09-11 DIAGNOSIS — Z34.03 ENCOUNTER FOR SUPERVISION OF NORMAL FIRST PREGNANCY IN THIRD TRIMESTER: Primary | ICD-10-CM

## 2019-09-11 DIAGNOSIS — Z23 NEED FOR TDAP VACCINATION: ICD-10-CM

## 2019-09-11 DIAGNOSIS — Z23 NEED FOR INFLUENZA VACCINATION: ICD-10-CM

## 2019-09-11 DIAGNOSIS — Z3A.29 29 WEEKS GESTATION OF PREGNANCY: ICD-10-CM

## 2019-09-11 PROCEDURE — 90471 FLU VACCINE (QUAD) GREATER THAN OR EQUAL TO 3YO PRESERVATIVE FREE IM: ICD-10-PCS | Mod: S$GLB,,, | Performed by: OBSTETRICS & GYNECOLOGY

## 2019-09-11 PROCEDURE — 0502F PR SUBSEQUENT PRENATAL CARE: ICD-10-PCS | Mod: CPTII,S$GLB,, | Performed by: OBSTETRICS & GYNECOLOGY

## 2019-09-11 PROCEDURE — 99999 PR PBB SHADOW E&M-EST. PATIENT-LVL II: CPT | Mod: PBBFAC,,, | Performed by: OBSTETRICS & GYNECOLOGY

## 2019-09-11 PROCEDURE — 90686 FLU VACCINE (QUAD) GREATER THAN OR EQUAL TO 3YO PRESERVATIVE FREE IM: ICD-10-PCS | Mod: S$GLB,,, | Performed by: OBSTETRICS & GYNECOLOGY

## 2019-09-11 PROCEDURE — 90715 TDAP VACCINE 7 YRS/> IM: CPT | Mod: S$GLB,,, | Performed by: OBSTETRICS & GYNECOLOGY

## 2019-09-11 PROCEDURE — 90472 IMMUNIZATION ADMIN EACH ADD: CPT | Mod: S$GLB,,, | Performed by: OBSTETRICS & GYNECOLOGY

## 2019-09-11 PROCEDURE — 90471 IMMUNIZATION ADMIN: CPT | Mod: S$GLB,,, | Performed by: OBSTETRICS & GYNECOLOGY

## 2019-09-11 PROCEDURE — 90686 IIV4 VACC NO PRSV 0.5 ML IM: CPT | Mod: S$GLB,,, | Performed by: OBSTETRICS & GYNECOLOGY

## 2019-09-11 PROCEDURE — 0502F SUBSEQUENT PRENATAL CARE: CPT | Mod: CPTII,S$GLB,, | Performed by: OBSTETRICS & GYNECOLOGY

## 2019-09-11 PROCEDURE — 90715 TDAP VACCINE GREATER THAN OR EQUAL TO 7YO IM: ICD-10-PCS | Mod: S$GLB,,, | Performed by: OBSTETRICS & GYNECOLOGY

## 2019-09-11 PROCEDURE — 99999 PR PBB SHADOW E&M-EST. PATIENT-LVL II: ICD-10-PCS | Mod: PBBFAC,,, | Performed by: OBSTETRICS & GYNECOLOGY

## 2019-09-11 PROCEDURE — 90472 TDAP VACCINE GREATER THAN OR EQUAL TO 7YO IM: ICD-10-PCS | Mod: S$GLB,,, | Performed by: OBSTETRICS & GYNECOLOGY

## 2019-09-11 NOTE — PROGRESS NOTES
Patient with no complaints. Denies vaginal bleeding or contractions. Good FM. Tdap and flu discussed and ordered today.  Discussed fetal kick count instructions with patient to monitor fetal movement. RTC in 2 weeks.    Sign up for classes.     Coffective counseling sheet Keep Baby Close discussed with mother. Reinforced rooming in practices, continued skin to skin, and quiet hours as requested by mother.  Encouraged mother to download Coffective mobile suzanne if she has not already done so. Mother verbalizes understanding.

## 2019-09-22 ENCOUNTER — PATIENT MESSAGE (OUTPATIENT)
Dept: ADMINISTRATIVE | Facility: OTHER | Age: 29
End: 2019-09-22

## 2019-09-26 ENCOUNTER — ROUTINE PRENATAL (OUTPATIENT)
Dept: OBSTETRICS AND GYNECOLOGY | Facility: CLINIC | Age: 29
End: 2019-09-26
Payer: COMMERCIAL

## 2019-09-26 VITALS
WEIGHT: 175 LBS | DIASTOLIC BLOOD PRESSURE: 70 MMHG | BODY MASS INDEX: 33.07 KG/M2 | SYSTOLIC BLOOD PRESSURE: 120 MMHG | HEART RATE: 80 BPM

## 2019-09-26 DIAGNOSIS — Z34.03 ENCOUNTER FOR SUPERVISION OF NORMAL FIRST PREGNANCY IN THIRD TRIMESTER: Primary | ICD-10-CM

## 2019-09-26 DIAGNOSIS — Z36.89 ENCOUNTER FOR ULTRASOUND TO ASSESS FETAL GROWTH: ICD-10-CM

## 2019-09-26 DIAGNOSIS — Z3A.31 31 WEEKS GESTATION OF PREGNANCY: ICD-10-CM

## 2019-09-26 PROCEDURE — 0502F SUBSEQUENT PRENATAL CARE: CPT | Mod: CPTII,S$GLB,, | Performed by: OBSTETRICS & GYNECOLOGY

## 2019-09-26 PROCEDURE — 99999 PR PBB SHADOW E&M-EST. PATIENT-LVL II: CPT | Mod: PBBFAC,,, | Performed by: OBSTETRICS & GYNECOLOGY

## 2019-09-26 PROCEDURE — 0502F PR SUBSEQUENT PRENATAL CARE: ICD-10-PCS | Mod: CPTII,S$GLB,, | Performed by: OBSTETRICS & GYNECOLOGY

## 2019-09-26 PROCEDURE — 99999 PR PBB SHADOW E&M-EST. PATIENT-LVL II: ICD-10-PCS | Mod: PBBFAC,,, | Performed by: OBSTETRICS & GYNECOLOGY

## 2019-09-26 NOTE — PROGRESS NOTES
Patient with no complaints. Denies vaginal bleeding or contractions. Good FM. Growth scan ordered for next visit. RTC in weeks.     Coffective Motivation Document discussed with mother. Reinforced benefits of breastfeeding, risk of formula, and cue based feedings. Encouraged mother to download Coffective mobile suzanne if she has not already done so. Mother verbalizes understanding.

## 2019-10-10 ENCOUNTER — PROCEDURE VISIT (OUTPATIENT)
Dept: OBSTETRICS AND GYNECOLOGY | Facility: CLINIC | Age: 29
End: 2019-10-10
Payer: COMMERCIAL

## 2019-10-10 ENCOUNTER — ROUTINE PRENATAL (OUTPATIENT)
Dept: OBSTETRICS AND GYNECOLOGY | Facility: CLINIC | Age: 29
End: 2019-10-10
Payer: COMMERCIAL

## 2019-10-10 VITALS
WEIGHT: 178.81 LBS | BODY MASS INDEX: 33.78 KG/M2 | DIASTOLIC BLOOD PRESSURE: 84 MMHG | SYSTOLIC BLOOD PRESSURE: 136 MMHG | HEART RATE: 76 BPM

## 2019-10-10 DIAGNOSIS — Z3A.33 33 WEEKS GESTATION OF PREGNANCY: ICD-10-CM

## 2019-10-10 DIAGNOSIS — Z36.89 ENCOUNTER FOR ULTRASOUND TO ASSESS FETAL GROWTH: ICD-10-CM

## 2019-10-10 DIAGNOSIS — Z34.03 ENCOUNTER FOR SUPERVISION OF NORMAL FIRST PREGNANCY IN THIRD TRIMESTER: Primary | ICD-10-CM

## 2019-10-10 PROCEDURE — 99499 NO LOS: ICD-10-PCS | Mod: S$GLB,,, | Performed by: OBSTETRICS & GYNECOLOGY

## 2019-10-10 PROCEDURE — 0502F PR SUBSEQUENT PRENATAL CARE: ICD-10-PCS | Mod: CPTII,S$GLB,, | Performed by: OBSTETRICS & GYNECOLOGY

## 2019-10-10 PROCEDURE — 76816 OB US FOLLOW-UP PER FETUS: CPT | Mod: S$GLB,,, | Performed by: OBSTETRICS & GYNECOLOGY

## 2019-10-10 PROCEDURE — 0502F SUBSEQUENT PRENATAL CARE: CPT | Mod: CPTII,S$GLB,, | Performed by: OBSTETRICS & GYNECOLOGY

## 2019-10-10 PROCEDURE — 99999 PR PBB SHADOW E&M-EST. PATIENT-LVL III: ICD-10-PCS | Mod: PBBFAC,,, | Performed by: OBSTETRICS & GYNECOLOGY

## 2019-10-10 PROCEDURE — 99499 UNLISTED E&M SERVICE: CPT | Mod: S$GLB,,, | Performed by: OBSTETRICS & GYNECOLOGY

## 2019-10-10 PROCEDURE — 76816 PR  US,PREGNANT UTERUS,F/U,TRANSABD APP: ICD-10-PCS | Mod: S$GLB,,, | Performed by: OBSTETRICS & GYNECOLOGY

## 2019-10-10 PROCEDURE — 99999 PR PBB SHADOW E&M-EST. PATIENT-LVL III: CPT | Mod: PBBFAC,,, | Performed by: OBSTETRICS & GYNECOLOGY

## 2019-10-10 RX ORDER — FERROUS SULFATE 325(65) MG
325 TABLET ORAL
COMMUNITY
End: 2020-01-06

## 2019-10-10 RX ORDER — DOCUSATE SODIUM 100 MG/1
250 CAPSULE, LIQUID FILLED ORAL 2 TIMES DAILY
COMMUNITY
End: 2020-05-20

## 2019-10-10 NOTE — PROGRESS NOTES
Patient with no complaints. Denies vaginal bleeding or contractions. Good FM. Growth scan reviewed. AGA fetus; vertex presentation. Normal MVP. RTC in 2 weeks.     Patient instructed to monitor BPs at home and call with any mild or severe range BPs.     Coffective counseling sheet Build Your Team discussed with mother. Reinforced importance of early identification of support team including champion, OB provider, pediatrician and local community resources. Encouraged mother to download Coffective mobile suzanne if she has not already done so.  Mother verbalizes understanding. Also discussed quiet time and delayed bathing.

## 2019-10-21 ENCOUNTER — TELEPHONE (OUTPATIENT)
Dept: OBSTETRICS AND GYNECOLOGY | Facility: CLINIC | Age: 29
End: 2019-10-21

## 2019-10-21 NOTE — TELEPHONE ENCOUNTER
Pt calling with concerns regarding fetal movement. States baby was not as active when she went to bed last night. Woke up during the night and did kick counts, got 10 movements in 2 hours. Informed patient this is what we would like for her to continue to do, and get 10 kicks/movements in the 2 hours. Instructed to continue to monitor, and contact clinic if any further concerns or not getting her 10 kicks in the 2 hours. Pt voiced understanding.

## 2019-10-23 ENCOUNTER — ROUTINE PRENATAL (OUTPATIENT)
Dept: OBSTETRICS AND GYNECOLOGY | Facility: CLINIC | Age: 29
End: 2019-10-23
Payer: COMMERCIAL

## 2019-10-23 VITALS
WEIGHT: 178 LBS | DIASTOLIC BLOOD PRESSURE: 80 MMHG | BODY MASS INDEX: 33.63 KG/M2 | SYSTOLIC BLOOD PRESSURE: 120 MMHG | HEART RATE: 80 BPM

## 2019-10-23 DIAGNOSIS — Z3A.35 35 WEEKS GESTATION OF PREGNANCY: ICD-10-CM

## 2019-10-23 DIAGNOSIS — O36.60X0 EXCESSIVE FETAL GROWTH, ANTEPARTUM, SINGLE OR UNSPECIFIED FETUS: ICD-10-CM

## 2019-10-23 DIAGNOSIS — Z34.03 ENCOUNTER FOR SUPERVISION OF NORMAL FIRST PREGNANCY IN THIRD TRIMESTER: Primary | ICD-10-CM

## 2019-10-23 PROCEDURE — 99999 PR PBB SHADOW E&M-EST. PATIENT-LVL II: CPT | Mod: PBBFAC,,, | Performed by: OBSTETRICS & GYNECOLOGY

## 2019-10-23 PROCEDURE — 99999 PR PBB SHADOW E&M-EST. PATIENT-LVL II: ICD-10-PCS | Mod: PBBFAC,,, | Performed by: OBSTETRICS & GYNECOLOGY

## 2019-10-23 PROCEDURE — 0502F PR SUBSEQUENT PRENATAL CARE: ICD-10-PCS | Mod: CPTII,S$GLB,, | Performed by: OBSTETRICS & GYNECOLOGY

## 2019-10-23 PROCEDURE — 0502F SUBSEQUENT PRENATAL CARE: CPT | Mod: CPTII,S$GLB,, | Performed by: OBSTETRICS & GYNECOLOGY

## 2019-10-23 NOTE — PROGRESS NOTES
Patient with no complaints. Denies vaginal bleeding or contractions. Good FM. Labor precautions discused with patient. RTC in 1 week. Growth scan next week.

## 2019-10-31 ENCOUNTER — ROUTINE PRENATAL (OUTPATIENT)
Dept: OBSTETRICS AND GYNECOLOGY | Facility: CLINIC | Age: 29
End: 2019-10-31
Payer: COMMERCIAL

## 2019-10-31 ENCOUNTER — PROCEDURE VISIT (OUTPATIENT)
Dept: OBSTETRICS AND GYNECOLOGY | Facility: CLINIC | Age: 29
End: 2019-10-31
Payer: COMMERCIAL

## 2019-10-31 VITALS
WEIGHT: 186 LBS | DIASTOLIC BLOOD PRESSURE: 82 MMHG | HEART RATE: 78 BPM | BODY MASS INDEX: 35.14 KG/M2 | SYSTOLIC BLOOD PRESSURE: 124 MMHG

## 2019-10-31 DIAGNOSIS — O36.60X0 EXCESSIVE FETAL GROWTH, ANTEPARTUM, SINGLE OR UNSPECIFIED FETUS: ICD-10-CM

## 2019-10-31 DIAGNOSIS — Z3A.36 36 WEEKS GESTATION OF PREGNANCY: ICD-10-CM

## 2019-10-31 DIAGNOSIS — Z34.03 ENCOUNTER FOR SUPERVISION OF NORMAL FIRST PREGNANCY IN THIRD TRIMESTER: Primary | ICD-10-CM

## 2019-10-31 PROCEDURE — 87081 CULTURE SCREEN ONLY: CPT

## 2019-10-31 PROCEDURE — 76816 OB US FOLLOW-UP PER FETUS: CPT | Mod: S$GLB,,, | Performed by: OBSTETRICS & GYNECOLOGY

## 2019-10-31 PROCEDURE — 99999 PR PBB SHADOW E&M-EST. PATIENT-LVL III: CPT | Mod: PBBFAC,,, | Performed by: OBSTETRICS & GYNECOLOGY

## 2019-10-31 PROCEDURE — 76816 PR  US,PREGNANT UTERUS,F/U,TRANSABD APP: ICD-10-PCS | Mod: S$GLB,,, | Performed by: OBSTETRICS & GYNECOLOGY

## 2019-10-31 PROCEDURE — 0502F SUBSEQUENT PRENATAL CARE: CPT | Mod: CPTII,S$GLB,, | Performed by: OBSTETRICS & GYNECOLOGY

## 2019-10-31 PROCEDURE — 0502F PR SUBSEQUENT PRENATAL CARE: ICD-10-PCS | Mod: CPTII,S$GLB,, | Performed by: OBSTETRICS & GYNECOLOGY

## 2019-10-31 PROCEDURE — 99999 PR PBB SHADOW E&M-EST. PATIENT-LVL III: ICD-10-PCS | Mod: PBBFAC,,, | Performed by: OBSTETRICS & GYNECOLOGY

## 2019-11-02 LAB — BACTERIA SPEC AEROBE CULT: NORMAL

## 2019-11-04 ENCOUNTER — TELEPHONE (OUTPATIENT)
Dept: OBSTETRICS AND GYNECOLOGY | Facility: CLINIC | Age: 29
End: 2019-11-04

## 2019-11-04 NOTE — TELEPHONE ENCOUNTER
Pt calling states 36 week ob with some brown discharge when wiping. Pt notified can be normal. Monitor and if it becomes bright red bleeding to contact clinic. Pt instructed to increase fluids and voiced understanding.

## 2019-11-05 ENCOUNTER — TELEPHONE (OUTPATIENT)
Dept: OBSTETRICS AND GYNECOLOGY | Facility: CLINIC | Age: 29
End: 2019-11-05

## 2019-11-05 NOTE — TELEPHONE ENCOUNTER
----- Message from Jammie Ashford MA sent at 11/5/2019  1:01 PM CST -----  Contact: Self      ----- Message -----  From: Kaylyn Ventura  Sent: 11/5/2019  12:42 PM CST  To: Wyatt Metcalf Staff    Keisha Mena  MRN: [id number]  Home Phone      [phone]  Work Phone      Not on file.  Mobile          [phone]    Patient Care Team:  Lisa Fung MD as PCP - General (Internal Medicine)  Marilu Hauser MD as Obstetrician (Obstetrics)  Erica Gill LPN as Care Coordinator  OB? Yes, 36w6d  What phone number can you be reached at? [phone]  Message:   Pt would like to know if she can schedule the remainder of her Routine OB visits. Please advise with dates and times.

## 2019-11-07 ENCOUNTER — PATIENT MESSAGE (OUTPATIENT)
Dept: OBSTETRICS AND GYNECOLOGY | Facility: CLINIC | Age: 29
End: 2019-11-07

## 2019-11-08 ENCOUNTER — ROUTINE PRENATAL (OUTPATIENT)
Dept: OBSTETRICS AND GYNECOLOGY | Facility: CLINIC | Age: 29
End: 2019-11-08
Payer: COMMERCIAL

## 2019-11-08 VITALS
DIASTOLIC BLOOD PRESSURE: 80 MMHG | BODY MASS INDEX: 34.96 KG/M2 | SYSTOLIC BLOOD PRESSURE: 120 MMHG | WEIGHT: 185 LBS | HEART RATE: 72 BPM

## 2019-11-08 DIAGNOSIS — Z34.03 ENCOUNTER FOR SUPERVISION OF NORMAL FIRST PREGNANCY IN THIRD TRIMESTER: Primary | ICD-10-CM

## 2019-11-08 DIAGNOSIS — Z3A.37 37 WEEKS GESTATION OF PREGNANCY: ICD-10-CM

## 2019-11-08 PROCEDURE — 0502F PR SUBSEQUENT PRENATAL CARE: ICD-10-PCS | Mod: CPTII,S$GLB,, | Performed by: OBSTETRICS & GYNECOLOGY

## 2019-11-08 PROCEDURE — 99999 PR PBB SHADOW E&M-EST. PATIENT-LVL III: CPT | Mod: PBBFAC,,, | Performed by: OBSTETRICS & GYNECOLOGY

## 2019-11-08 PROCEDURE — 0502F SUBSEQUENT PRENATAL CARE: CPT | Mod: CPTII,S$GLB,, | Performed by: OBSTETRICS & GYNECOLOGY

## 2019-11-08 PROCEDURE — 99999 PR PBB SHADOW E&M-EST. PATIENT-LVL III: ICD-10-PCS | Mod: PBBFAC,,, | Performed by: OBSTETRICS & GYNECOLOGY

## 2019-11-13 ENCOUNTER — TELEPHONE (OUTPATIENT)
Dept: OBSTETRICS AND GYNECOLOGY | Facility: CLINIC | Age: 29
End: 2019-11-13

## 2019-11-13 ENCOUNTER — ANESTHESIA (OUTPATIENT)
Dept: OBSTETRICS AND GYNECOLOGY | Facility: HOSPITAL | Age: 29
End: 2019-11-13
Payer: COMMERCIAL

## 2019-11-13 ENCOUNTER — ANESTHESIA EVENT (OUTPATIENT)
Dept: OBSTETRICS AND GYNECOLOGY | Facility: HOSPITAL | Age: 29
End: 2019-11-13
Payer: COMMERCIAL

## 2019-11-13 PROBLEM — O16.3 ELEVATED BLOOD PRESSURE AFFECTING PREGNANCY IN THIRD TRIMESTER, ANTEPARTUM: Status: ACTIVE | Noted: 2019-11-13

## 2019-11-13 NOTE — TELEPHONE ENCOUNTER
38 wk ob called with c/o blood pressure being high and connected moms recommended to notify her Dr.   Her readings were consistently in the 140's over 90's with the highest being over 96 her pulse is also in the 90's. Please advise

## 2019-11-14 ENCOUNTER — HOSPITAL ENCOUNTER (INPATIENT)
Facility: HOSPITAL | Age: 29
LOS: 2 days | Discharge: HOME | End: 2019-11-16
Attending: OBSTETRICS & GYNECOLOGY | Admitting: OBSTETRICS & GYNECOLOGY
Payer: COMMERCIAL

## 2019-11-14 ENCOUNTER — TELEPHONE (OUTPATIENT)
Dept: OBSTETRICS AND GYNECOLOGY | Facility: CLINIC | Age: 29
End: 2019-11-14

## 2019-11-14 ENCOUNTER — PATIENT MESSAGE (OUTPATIENT)
Dept: OBSTETRICS AND GYNECOLOGY | Facility: CLINIC | Age: 29
End: 2019-11-14

## 2019-11-14 DIAGNOSIS — O13.9 GESTATIONAL HYPERTENSION: ICD-10-CM

## 2019-11-14 DIAGNOSIS — Z34.03 ENCOUNTER FOR SUPERVISION OF NORMAL FIRST PREGNANCY IN THIRD TRIMESTER: ICD-10-CM

## 2019-11-14 LAB
BASOPHILS # BLD AUTO: 0.01 K/UL (ref 0–0.2)
BASOPHILS NFR BLD: 0.1 % (ref 0–1.9)
DIFFERENTIAL METHOD: ABNORMAL
EOSINOPHIL # BLD AUTO: 0.1 K/UL (ref 0–0.5)
EOSINOPHIL NFR BLD: 1.1 % (ref 0–8)
ERYTHROCYTE [DISTWIDTH] IN BLOOD BY AUTOMATED COUNT: 14.8 % (ref 11.5–14.5)
HCT VFR BLD AUTO: 36.5 % (ref 37–48.5)
HGB BLD-MCNC: 12.4 G/DL (ref 12–16)
IMM GRANULOCYTES # BLD AUTO: 0.03 K/UL (ref 0–0.04)
IMM GRANULOCYTES NFR BLD AUTO: 0.4 % (ref 0–0.5)
LYMPHOCYTES # BLD AUTO: 1.4 K/UL (ref 1–4.8)
LYMPHOCYTES NFR BLD: 18 % (ref 18–48)
MCH RBC QN AUTO: 32.5 PG (ref 27–31)
MCHC RBC AUTO-ENTMCNC: 34 G/DL (ref 32–36)
MCV RBC AUTO: 96 FL (ref 82–98)
MONOCYTES # BLD AUTO: 0.4 K/UL (ref 0.3–1)
MONOCYTES NFR BLD: 5 % (ref 4–15)
NEUTROPHILS # BLD AUTO: 6 K/UL (ref 1.8–7.7)
NEUTROPHILS NFR BLD: 75.4 % (ref 38–73)
NRBC BLD-RTO: 0 /100 WBC
PLATELET # BLD AUTO: 123 K/UL (ref 150–350)
PMV BLD AUTO: 11.4 FL (ref 9.2–12.9)
RBC # BLD AUTO: 3.81 M/UL (ref 4–5.4)
WBC # BLD AUTO: 7.95 K/UL (ref 3.9–12.7)

## 2019-11-14 PROCEDURE — 85025 COMPLETE CBC W/AUTO DIFF WBC: CPT

## 2019-11-14 PROCEDURE — 11000001 HC ACUTE MED/SURG PRIVATE ROOM

## 2019-11-14 PROCEDURE — 72100003 HC LABOR CARE, EA. ADDL. 8 HRS

## 2019-11-14 PROCEDURE — 63600175 PHARM REV CODE 636 W HCPCS: Performed by: OBSTETRICS & GYNECOLOGY

## 2019-11-14 PROCEDURE — 36415 COLL VENOUS BLD VENIPUNCTURE: CPT

## 2019-11-14 PROCEDURE — 72100002 HC LABOR CARE, 1ST 8 HOURS

## 2019-11-14 PROCEDURE — G0378 HOSPITAL OBSERVATION PER HR: HCPCS

## 2019-11-14 PROCEDURE — 01967 NEURAXL LBR ANES VAG DLVR: CPT | Mod: QZ,P2 | Performed by: NURSE ANESTHETIST, CERTIFIED REGISTERED

## 2019-11-14 PROCEDURE — 25000003 PHARM REV CODE 250: Performed by: NURSE ANESTHETIST, CERTIFIED REGISTERED

## 2019-11-14 PROCEDURE — 62326 NJX INTERLAMINAR LMBR/SAC: CPT | Performed by: NURSE ANESTHETIST, CERTIFIED REGISTERED

## 2019-11-14 PROCEDURE — 99211 OFF/OP EST MAY X REQ PHY/QHP: CPT

## 2019-11-14 PROCEDURE — 25000003 PHARM REV CODE 250: Performed by: OBSTETRICS & GYNECOLOGY

## 2019-11-14 RX ORDER — SIMETHICONE 80 MG
1 TABLET,CHEWABLE ORAL 4 TIMES DAILY PRN
Status: DISCONTINUED | OUTPATIENT
Start: 2019-11-14 | End: 2019-11-15

## 2019-11-14 RX ORDER — SODIUM CHLORIDE, SODIUM LACTATE, POTASSIUM CHLORIDE, CALCIUM CHLORIDE 600; 310; 30; 20 MG/100ML; MG/100ML; MG/100ML; MG/100ML
INJECTION, SOLUTION INTRAVENOUS CONTINUOUS
Status: DISCONTINUED | OUTPATIENT
Start: 2019-11-14 | End: 2019-11-15

## 2019-11-14 RX ORDER — OXYTOCIN/RINGER'S LACTATE 30/500 ML
334 PLASTIC BAG, INJECTION (ML) INTRAVENOUS CONTINUOUS
Status: ACTIVE | OUTPATIENT
Start: 2019-11-14 | End: 2019-11-14

## 2019-11-14 RX ORDER — MISOPROSTOL 100 MCG
25 TABLET ORAL EVERY 4 HOURS
Status: DISCONTINUED | OUTPATIENT
Start: 2019-11-14 | End: 2019-11-14

## 2019-11-14 RX ORDER — TERBUTALINE SULFATE 1 MG/ML
0.25 INJECTION SUBCUTANEOUS
Status: DISCONTINUED | OUTPATIENT
Start: 2019-11-14 | End: 2019-11-15

## 2019-11-14 RX ORDER — CALCIUM CARBONATE 200(500)MG
500 TABLET,CHEWABLE ORAL 3 TIMES DAILY PRN
Status: DISCONTINUED | OUTPATIENT
Start: 2019-11-14 | End: 2019-11-15

## 2019-11-14 RX ORDER — OXYTOCIN/RINGER'S LACTATE 30/500 ML
95 PLASTIC BAG, INJECTION (ML) INTRAVENOUS CONTINUOUS
Status: ACTIVE | OUTPATIENT
Start: 2019-11-14 | End: 2019-11-14

## 2019-11-14 RX ORDER — SODIUM CHLORIDE 9 MG/ML
INJECTION, SOLUTION INTRAVENOUS
Status: DISCONTINUED | OUTPATIENT
Start: 2019-11-14 | End: 2019-11-15

## 2019-11-14 RX ORDER — ONDANSETRON 4 MG/1
8 TABLET, ORALLY DISINTEGRATING ORAL EVERY 8 HOURS PRN
Status: DISCONTINUED | OUTPATIENT
Start: 2019-11-14 | End: 2019-11-15

## 2019-11-14 RX ORDER — OXYTOCIN/RINGER'S LACTATE 30/500 ML
2 PLASTIC BAG, INJECTION (ML) INTRAVENOUS CONTINUOUS
Status: DISCONTINUED | OUTPATIENT
Start: 2019-11-14 | End: 2019-11-15

## 2019-11-14 RX ADMIN — ANTACID TABLETS 500 MG: 500 TABLET, CHEWABLE ORAL at 11:11

## 2019-11-14 RX ADMIN — Medication 25 MCG: at 12:11

## 2019-11-14 RX ADMIN — SODIUM CHLORIDE, SODIUM LACTATE, POTASSIUM CHLORIDE, AND CALCIUM CHLORIDE: .6; .31; .03; .02 INJECTION, SOLUTION INTRAVENOUS at 12:11

## 2019-11-14 RX ADMIN — Medication 12 ML/HR: at 06:11

## 2019-11-14 RX ADMIN — SODIUM CHLORIDE, SODIUM LACTATE, POTASSIUM CHLORIDE, AND CALCIUM CHLORIDE: .6; .31; .03; .02 INJECTION, SOLUTION INTRAVENOUS at 07:11

## 2019-11-14 RX ADMIN — Medication 2 MILLI-UNITS/MIN: at 05:11

## 2019-11-14 RX ADMIN — SODIUM CHLORIDE, SODIUM LACTATE, POTASSIUM CHLORIDE, AND CALCIUM CHLORIDE: .6; .31; .03; .02 INJECTION, SOLUTION INTRAVENOUS at 05:11

## 2019-11-14 NOTE — TELEPHONE ENCOUNTER
----- Message from Tea Schneider MD sent at 11/14/2019  9:56 AM CST -----  Please tell her to come to L&D for induction; please call L&D and let them know

## 2019-11-14 NOTE — H&P
History and Physical  Obstetrics      SUBJECTIVE:     Keisha Mena is a 29 y.o.  female  at 38w1d weeks gestation with an Estimated Date of Delivery: 19 who is admitted complaining of Induction of labor for: Hypertension. She denies Decreased Fetal Movement, Leakage of Fluid, Vaginal Bleeding and Contractions. Fetal Movement: normal.    She has been followed prenatally with the following prenatal complications:   Patient Active Problem List   Diagnosis    Supervision of normal pregnancy    Elevated blood pressure affecting pregnancy in third trimester, antepartum    Gestational hypertension         HISTORY:     Prior to Admission medications    Medication Sig Start Date End Date Taking? Authorizing Provider   docusate sodium (COLACE) 100 MG capsule Take 100 mg by mouth 2 (two) times daily.   Yes Historical Provider, MD   ferrous sulfate (FEOSOL) 325 mg (65 mg iron) Tab tablet Take 325 mg by mouth daily with breakfast.   Yes Historical Provider, MD   PNV no.95/ferrous fum/folic ac (PRENATAL MULTIVITAMINS ORAL) Take by mouth.   Yes Historical Provider, MD   valACYclovir (VALTREX) 1000 MG tablet  5/10/19   Historical Provider, MD     Outpatient Medications Marked as Taking for the 19 encounter (Hospital Encounter)   Medication Sig Dispense Refill    docusate sodium (COLACE) 100 MG capsule Take 100 mg by mouth 2 (two) times daily.      ferrous sulfate (FEOSOL) 325 mg (65 mg iron) Tab tablet Take 325 mg by mouth daily with breakfast.      PNV no.95/ferrous fum/folic ac (PRENATAL MULTIVITAMINS ORAL) Take by mouth.        History reviewed. No pertinent past medical history.  History reviewed. No pertinent surgical history.  Family History   Problem Relation Age of Onset    Hypertension Mother     Alcohol abuse Father         history of s/p liver transplant    Breast cancer Neg Hx     Colon cancer Neg Hx     Ovarian cancer Neg Hx      Social History     Tobacco Use    Smoking status: Never  Smoker    Smokeless tobacco: Never Used   Substance Use Topics    Alcohol use: No     Frequency: Never    Drug use: No     OB History    Para Term  AB Living   1 0 0 0 0 0   SAB TAB Ectopic Multiple Live Births   0 0 0 0        # Outcome Date GA Lbr Reji/2nd Weight Sex Delivery Anes PTL Lv   1 Current                  Allergy:   Review of patient's allergies indicates:  No Known Allergies       ROS:   CONSTITUTIONAL: Negative for fever, chills, diaphoresis, weakness, fatigue, weight loss, weight gain  ENT: negative for sore throat, nasal congestion, nasal discharge, epistaxis, tinnitus, hearing loss  EYES: negative for blurry vision, decreased vision, loss of vision, eye pain, diplopia, photophobia, discharge  SKIN: Negative for rash, itching, hives  RESPIRATORY: negative for cough, hemoptysis, shortness of breath, pleuritic chest pain, wheezing  CARDIOVASCULAR: negative for chest pain, dyspnea on exertion, orthopnea, paroxysmal nocturnal dyspnea, edema, palpitations  BREAST: negative for breast  tenderness, breast mass, nipple discharge, or skin changes  GASTROINTESTINAL: negative for abdominal pain, flank pain, nausea, vomiting, diarrhea, constipation, black stool, blood in stool  GENITOURINARY: negative for abnormal vaginal bleeding, amenorrhea, decreased libido, dysuria, genital sores, hematuria, incontinence, menorrhagia, pelvic pain, urinary frequency, vaginal discharge  HEMATOLOGIC/LYMPHATIC: negative for swollen lymph nodes, bleeding, bruising  MUSCULOSKELETAL: negative for back pain, joint pain, joint stiffness, joint swelling, muscle pain, muscle weakness  NEUROLOGICAL: negative for dizzy/vertigo, headache, focal weakness, numbness/tingling, speech problems, loss of consciousness, confusion, memory loss  BEHAVORIAL/PSYCH: negative for anxiety, depression, psychiatric illness  ENDOCRINE: negative for polydipsia/polyuria, palpitations, skin changes, temperature intolerance, unexpected weight  changes  ALLERGIC/IMMUNOLOGIC: negative for urticaria, hay fever, angioedema      OBJECTIVE:     Initial Vitals   BP Pulse Resp Temp SpO2   11/14/19 1144 11/14/19 1144 11/14/19 1144 11/14/19 1155 --   (!) 140/85 (!) 125 18 98.2 °F (36.8 °C)       MAP       --                    Physical Exam:  General:  alert,normal appearing gravid female   Skin:  Skin color, texture, turgor normal. No rashes or lesions   HEENT:  conjunctivae/corneas clear. LUIS   Lungs:  clear to auscultation bilaterally   Heart:  regular rate and rhythm, S1, S2 normal, no murmur, click, rub or gallop   Breasts:  no discharge, erythema, or tenderness   Abdomen:  soft, non-tender; bowel sounds normal   Uterine Size:  consistent with dates   Presentations:  cephalic   FHT:  140 BPM   Pelvis: External genitalia: normal external genitalia without lesions  Vaginal: without lesions or discharge   Cervix:     Dilation: 0 cm    Effacement: 50 %    Station:  -3    Consistency: firm    Position: posterior       OB Lab History:     Group & Rh   Date Value Ref Range Status   11/13/2019 O POS  Final     Indirect Carlos   Date Value Ref Range Status   11/13/2019 NEG  Final     Lab Results   Component Value Date    WBC 7.95 11/14/2019    HGB 12.4 11/14/2019    HCT 36.5 (L) 11/14/2019    MCV 96 11/14/2019     (L) 11/14/2019         Lab Results   Component Value Date    TSH 1.973 03/21/2019     Lab Results   Component Value Date    RUBELLAIGGAN 47.6 04/04/2019     Lab Results   Component Value Date    RPR Non-reactive 11/13/2019     HIV 1/2 Ag/Ab   Date Value Ref Range Status   04/04/2019 Negative Negative Final     Hepatitis B Surface Ag   Date Value Ref Range Status   04/04/2019 Negative  Final     Results for orders placed or performed in visit on 08/27/19   OB Glucose Screen   Result Value Ref Range    OB Glucose Screen 113 70 - 140 mg/dL     Results for orders placed or performed in visit on 10/31/19   Strep B Screen, Vaginal / Rectal   Result Value  Ref Range    Strep B Culture No Group B Streptococcus isolated      No results found for this or any previous visit.  Results for orders placed or performed in visit on 04/04/19   C. trachomatis/N. gonorrhoeae by AMP DNA   Result Value Ref Range    Chlamydia, Amplified DNA Not Detected Not Detected    N gonorrhoeae, amplified DNA Not Detected Not Detected       ASSESSMENT/PLAN:     IUP 38w1d gestation  Patient Active Problem List   Diagnosis    Supervision of normal pregnancy    Elevated blood pressure affecting pregnancy in third trimester, antepartum    Gestational hypertension         PLAN:   Cytotec IOL followed by IV pitocin  GBS negative  Consents  Admit labs        Patient cleared for Anesthesia:  Epidural    Anesthesia/Surgery risks, benefits, and alternative options discussed and understood by patient/family.

## 2019-11-14 NOTE — ANESTHESIA PREPROCEDURE EVALUATION
11/14/2019  Keisha Mena is a 29 y.o., female.    Anesthesia Evaluation    I have reviewed the Patient Summary Reports.    I have reviewed the Nursing Notes.   I have reviewed the Medications.     Review of Systems  Anesthesia Hx:  No previous Anesthesia  Neg history of prior surgery. Denies Family Hx of Anesthesia complications.    Social:  Non-Smoker, No Alcohol Use    Hematology/Oncology:  Hematology Normal   Oncology Normal     EENT/Dental:EENT/Dental Normal   Cardiovascular:   Exercise tolerance: good Hypertension, well controlled Gestational HTN   Pulmonary:  Pulmonary Normal    Renal/:  Renal/ Normal     Hepatic/GI:  Hepatic/GI Normal    Musculoskeletal:  Musculoskeletal Normal    Neurological:  Neurology Normal    Endocrine:  Endocrine Normal    Dermatological:  Skin Normal    Psych:  Psychiatric Normal           Physical Exam  General:  Well nourished    Airway/Jaw/Neck:  Airway Findings: Mouth Opening: Normal Tongue: Normal  General Airway Assessment: Adult  Mallampati: II  TM Distance: Normal, at least 6 cm  Jaw/Neck Findings:  Neck ROM: Normal ROM      Dental:  Dental Findings: In tact        Mental Status:  Mental Status Findings:  Cooperative, Alert and Oriented         Anesthesia Plan  Type of Anesthesia, risks & benefits discussed:  Anesthesia Type:  CSE, epidural, general, spinal  Patient's Preference:   Intra-op Monitoring Plan: standard ASA monitors  Intra-op Monitoring Plan Comments:   Post Op Pain Control Plan: multimodal analgesia  Post Op Pain Control Plan Comments:   Induction:   IV  Beta Blocker:  Patient is not currently on a Beta-Blocker (No further documentation required).       Informed Consent: Patient understands risks and agrees with Anesthesia plan.  Questions answered. Anesthesia consent signed with patient.  ASA Score: 2     Day of Surgery Review of History & Physical: I  have interviewed and examined the patient. I have reviewed the patient's H&P dated: 11/14/19. There are no significant changes.  H&P update referred to the surgeon.         Ready For Surgery From Anesthesia Perspective.

## 2019-11-15 PROCEDURE — 25000003 PHARM REV CODE 250: Performed by: OBSTETRICS & GYNECOLOGY

## 2019-11-15 PROCEDURE — 72200005 HC VAGINAL DELIVERY LEVEL II

## 2019-11-15 PROCEDURE — 59400 PR FULL ROUT OBSTE CARE,VAGINAL DELIV: ICD-10-PCS | Mod: GB,,, | Performed by: OBSTETRICS & GYNECOLOGY

## 2019-11-15 PROCEDURE — 63600175 PHARM REV CODE 636 W HCPCS: Performed by: NURSE ANESTHETIST, CERTIFIED REGISTERED

## 2019-11-15 PROCEDURE — 59400 OBSTETRICAL CARE: CPT | Mod: GB,,, | Performed by: OBSTETRICS & GYNECOLOGY

## 2019-11-15 PROCEDURE — 63600175 PHARM REV CODE 636 W HCPCS: Performed by: OBSTETRICS & GYNECOLOGY

## 2019-11-15 PROCEDURE — 11000001 HC ACUTE MED/SURG PRIVATE ROOM

## 2019-11-15 RX ORDER — IBUPROFEN 600 MG/1
600 TABLET ORAL EVERY 6 HOURS PRN
Status: DISCONTINUED | OUTPATIENT
Start: 2019-11-15 | End: 2019-11-16 | Stop reason: HOSPADM

## 2019-11-15 RX ORDER — HYDROCODONE BITARTRATE AND ACETAMINOPHEN 5; 325 MG/1; MG/1
1 TABLET ORAL EVERY 4 HOURS PRN
Status: DISCONTINUED | OUTPATIENT
Start: 2019-11-15 | End: 2019-11-16 | Stop reason: HOSPADM

## 2019-11-15 RX ORDER — DIPHENHYDRAMINE HCL 25 MG
25 CAPSULE ORAL EVERY 4 HOURS PRN
Status: DISCONTINUED | OUTPATIENT
Start: 2019-11-15 | End: 2019-11-16 | Stop reason: HOSPADM

## 2019-11-15 RX ORDER — DOCUSATE SODIUM 100 MG/1
200 CAPSULE, LIQUID FILLED ORAL 2 TIMES DAILY PRN
Status: DISCONTINUED | OUTPATIENT
Start: 2019-11-15 | End: 2019-11-16 | Stop reason: HOSPADM

## 2019-11-15 RX ORDER — DIPHENHYDRAMINE HYDROCHLORIDE 50 MG/ML
25 INJECTION INTRAMUSCULAR; INTRAVENOUS EVERY 4 HOURS PRN
Status: DISCONTINUED | OUTPATIENT
Start: 2019-11-15 | End: 2019-11-16 | Stop reason: HOSPADM

## 2019-11-15 RX ORDER — BUTORPHANOL TARTRATE 2 MG/ML
1 INJECTION INTRAMUSCULAR; INTRAVENOUS ONCE
Status: COMPLETED | OUTPATIENT
Start: 2019-11-15 | End: 2019-11-15

## 2019-11-15 RX ORDER — HYDROCODONE BITARTRATE AND ACETAMINOPHEN 5; 325 MG/1; MG/1
1 TABLET ORAL
Status: COMPLETED | OUTPATIENT
Start: 2019-11-15 | End: 2019-11-15

## 2019-11-15 RX ORDER — ACETAMINOPHEN 325 MG/1
650 TABLET ORAL EVERY 6 HOURS PRN
Status: DISCONTINUED | OUTPATIENT
Start: 2019-11-15 | End: 2019-11-16 | Stop reason: HOSPADM

## 2019-11-15 RX ORDER — ROPIVACAINE HYDROCHLORIDE 2 MG/ML
INJECTION, SOLUTION EPIDURAL; INFILTRATION; PERINEURAL
Status: DISCONTINUED | OUTPATIENT
Start: 2019-11-15 | End: 2019-11-15

## 2019-11-15 RX ORDER — HYDROCODONE BITARTRATE AND ACETAMINOPHEN 10; 325 MG/1; MG/1
1 TABLET ORAL EVERY 4 HOURS PRN
Status: DISCONTINUED | OUTPATIENT
Start: 2019-11-15 | End: 2019-11-16 | Stop reason: HOSPADM

## 2019-11-15 RX ORDER — OXYTOCIN/RINGER'S LACTATE 30/500 ML
95 PLASTIC BAG, INJECTION (ML) INTRAVENOUS ONCE
Status: DISCONTINUED | OUTPATIENT
Start: 2019-11-15 | End: 2019-11-16 | Stop reason: HOSPADM

## 2019-11-15 RX ORDER — MISOPROSTOL 200 UG/1
600 TABLET ORAL
Status: DISCONTINUED | OUTPATIENT
Start: 2019-11-15 | End: 2019-11-16 | Stop reason: HOSPADM

## 2019-11-15 RX ORDER — DOCUSATE SODIUM 100 MG/1
100 CAPSULE, LIQUID FILLED ORAL 2 TIMES DAILY
Status: DISCONTINUED | OUTPATIENT
Start: 2019-11-15 | End: 2019-11-16 | Stop reason: HOSPADM

## 2019-11-15 RX ORDER — ONDANSETRON 8 MG/1
8 TABLET, ORALLY DISINTEGRATING ORAL EVERY 8 HOURS PRN
Status: DISCONTINUED | OUTPATIENT
Start: 2019-11-15 | End: 2019-11-16 | Stop reason: HOSPADM

## 2019-11-15 RX ORDER — PRENATAL WITH FERROUS FUM AND FOLIC ACID 3080; 920; 120; 400; 22; 1.84; 3; 20; 10; 1; 12; 200; 27; 25; 2 [IU]/1; [IU]/1; MG/1; [IU]/1; MG/1; MG/1; MG/1; MG/1; MG/1; MG/1; UG/1; MG/1; MG/1; MG/1; MG/1
1 TABLET ORAL DAILY
Status: DISCONTINUED | OUTPATIENT
Start: 2019-11-15 | End: 2019-11-16 | Stop reason: HOSPADM

## 2019-11-15 RX ADMIN — HYDROCODONE BITARTRATE AND ACETAMINOPHEN 1 TABLET: 5; 325 TABLET ORAL at 04:11

## 2019-11-15 RX ADMIN — IBUPROFEN 600 MG: 600 TABLET ORAL at 10:11

## 2019-11-15 RX ADMIN — IBUPROFEN 600 MG: 600 TABLET ORAL at 05:11

## 2019-11-15 RX ADMIN — ROPIVACAINE HYDROCHLORIDE 3 ML: 2 INJECTION, SOLUTION EPIDURAL; INFILTRATION at 02:11

## 2019-11-15 RX ADMIN — IBUPROFEN 600 MG: 600 TABLET ORAL at 08:11

## 2019-11-15 RX ADMIN — HYDROCODONE BITARTRATE AND ACETAMINOPHEN 1 TABLET: 5; 325 TABLET ORAL at 10:11

## 2019-11-15 RX ADMIN — DOCUSATE SODIUM 100 MG: 100 CAPSULE, LIQUID FILLED ORAL at 08:11

## 2019-11-15 RX ADMIN — HYDROCODONE BITARTRATE AND ACETAMINOPHEN 1 TABLET: 5; 325 TABLET ORAL at 05:11

## 2019-11-15 RX ADMIN — PRENATAL VIT W/ FE FUMARATE-FA TAB 27-0.8 MG 1 TABLET: 27-0.8 TAB at 08:11

## 2019-11-15 RX ADMIN — ROPIVACAINE HYDROCHLORIDE 3 ML: 2 INJECTION, SOLUTION EPIDURAL; INFILTRATION at 04:11

## 2019-11-15 RX ADMIN — BUTORPHANOL TARTRATE 1 MG: 2 INJECTION, SOLUTION INTRAMUSCULAR; INTRAVENOUS at 04:11

## 2019-11-15 NOTE — NURSING
Received patient from Angela Fields with telephone report from Barak Rojas RN. Room orientation done, vitals WDL. Fundus is firm without massage and 1 cm below umbilicus. Lochia is rubra and light. Patient Ambulating and showered prior to coming up.Spouse present at bedside with family visiting often.Good bonding noted with patient and baby.Assisted with several feeding with painful latch according to patient. Corrected latch however still has nipple discomfort. Baby seems to be latched correctly, with good positioning and swallowing noted. Can hand express thick yellow colostrum without difficulty. Went over hand expressing and joey to get baby to open wide on latch.Lactation is notified of patient's admit and Janie states she will see her today for a feeding.Bf support given.

## 2019-11-15 NOTE — NURSING
Pt transferred to [facility] room 328 via wheelchair holding infant in arms. Pt in stable condition. Accompanied by staff and family. Belongings with patient and family. Oriented to room and use of call system.

## 2019-11-15 NOTE — ANESTHESIA PROCEDURE NOTES
Epidural    Patient location during procedure: OB   Reason for block: primary anesthetic   Diagnosis: labor   Start time: 11/14/2019 6:34 PM  Timeout: 11/14/2019 6:25 PM  End time: 11/14/2019 6:37 PM  Surgery related to: pregnancy    Staffing  Performing Provider: Piotr Valdivia CRNA  Authorizing Provider: Piotr Valdivia CRNA        Preanesthetic Checklist  Completed: patient identified, site marked, surgical consent, pre-op evaluation, timeout performed, IV checked, risks and benefits discussed, monitors and equipment checked, anesthesia consent given, hand hygiene performed and patient being monitored  Preparation  Patient position: sitting  Prep: ChloraPrep  Patient monitoring: Blood Pressure and Pulse Ox  Epidural  Skin Anesthetic: lidocaine 1%  Skin Wheal: 3 mL  Administration type: continuous  Approach: midline  Interspace: L3-4    Injection technique: BRANDY saline  Needle and Epidural Catheter  Needle type: Tuohy   Needle gauge: 18  Needle length: 3.5 inches  Needle insertion depth: 6 cm  Catheter type: multi-orifice  Catheter size: 20 G  Catheter at skin depth: 13 cm  Test dose: 5 mL of lidocaine 1.5% with Epi 1-to-200,000  Additional Documentation: incremental injection, negative aspiration for heme and CSF, no paresthesia on injection, no signs/symptoms of IV or SA injection, no significant pain on injection and no significant complaints from patient  Needle localization: anatomical landmarks  Assessment  Upper dermatomal levels - Left: T10  Right: T10   Dermatomal levels determined by pinch or prick  Ease of block: easy  Patient's tolerance of the procedure: comfortable throughout block and no complaintsNo inadvertent dural puncture with Tuohy.  Dural puncture performed with spinal needle.

## 2019-11-15 NOTE — ANESTHESIA POSTPROCEDURE EVALUATION
Anesthesia Post Evaluation    Patient: Keisha Mena    Procedure(s) Performed: * No procedures listed *    Final Anesthesia Type: epidural    Patient location during evaluation: labor & delivery  Patient participation: Yes- Able to Participate  Level of consciousness: awake and alert and oriented  Post-procedure vital signs: reviewed and stable  Pain management: adequate  Airway patency: patent    PONV status at discharge: No PONV  Anesthetic complications: no      Cardiovascular status: blood pressure returned to baseline, hemodynamically stable and stable  Respiratory status: unassisted, spontaneous ventilation and room air  Hydration status: euvolemic  Follow-up not needed.          Vitals Value Taken Time   /94 11/15/2019  4:55 AM   Temp 35.6 °C (96.08 °F) 11/15/2019  4:09 AM   Pulse 107 11/15/2019  4:55 AM   Resp 22 11/15/2019  2:12 AM   SpO2 100 % 11/15/2019  4:54 AM   Vitals shown include unvalidated device data.      No case tracking events are documented in the log.      Pain/Rk Score: Pain Rating Prior to Med Admin: 8 (11/15/2019  4:53 AM)

## 2019-11-15 NOTE — L&D DELIVERY NOTE
[facility]  Vaginal Delivery   Operative Note    SUMMARY     Normal spontaneous vaginal delivery of live infant, skin to skin was unable to be performed due to poor respiratory effort of infant requiring PPV.  Infant delivered position OA over intact perineum.  Nuchal cord: No.    Spontaneous delivery of placenta and IV pitocin given noting good uterine tone.  2nd degree laceration noted and repaired in normal fashion with vicryl.  Patient tolerated delivery well. Sponge needle and lap counted correctly x2.    Indications: Gestational hypertension  Pregnancy complicated by:   Patient Active Problem List   Diagnosis    Supervision of normal pregnancy    Elevated blood pressure affecting pregnancy in third trimester, antepartum    Gestational hypertension     (spontaneous vaginal delivery)     Admitting GA: 38w2d    Delivery Information for Rajesh Mena    Birth information:  YOB: 2021   Time of birth: 4:29 AM   Sex: male   Head Delivery Date/Time: 11/15/2019  4:29 AM   Delivery type: Vaginal, Spontaneous   Gestational Age: 38w2d    Delivery Providers    Delivering clinician:  Tea Schneider MD   Provider Role    Deisi Faye RN Registered Nurse    Amairani Vazquez RN Registered Nurse    Malika Mahan RN Registered Nurse    Irma Park RN Registered Nurse    Richie Wilkins, [facility]            Measurements    Weight:  3910 g  Length:  52 cm  Head circumference:  36 cm  Chest circumference:  35 cm  Abdominal girth:  36 cm         Apgars    Living status:  Living  Apgars:   1 min.:   5 min.:   10 min.:   15 min.:   20 min.:     Skin color:   0  1  2      Heart rate:   2  2  2      Reflex irritability:   1  2  2      Muscle tone:   0  1  1      Respiratory effort:   0  1  2      Total:   3  7  9             Operative Delivery    Forceps attempted?:  No  Vacuum extractor attempted?:  No         Shoulder Dystocia    Shoulder dystocia present?:  No            Presentation    Presentation:  Vertex  Position:  Left Occiput Anterior           Interventions/Resuscitation    Method:  Bulb Suctioning, Tactile Stimulation, Deep Suctioning, CPAP, PPV       Cord    Vessels:  3 vessels  Complications:  None  Delayed Cord Clamping?:  No  Cord Blood Disposition:  Lab  Gases Sent?:  No  Stem Cell Collection (by MD):  No       Placenta    Placenta delivery date/time:  11/15/2019 0435  Placenta removal:  Spontaneous  Placenta appearance:  Intact  Placenta disposition:  discarded           Labor Events:       labor: No     Labor Onset Date/Time: 2019 20:51     Dilation Complete Date/Time: 11/15/2019 01:41     Start Pushing Date/Time: 11/15/2019 01:55     Rupture Date/Time:              Rupture type:           Fluid Amount:        Fluid Color:        Fluid Odor:        Membrane Status (PeriCalm): SRM (Spontaneous Rupture)      Rupture Date/Time (PeriCalm): 2019 21:13:00      Fluid Amount (PeriCalm): Moderate      Fluid Color (PeriCalm): Clear       steroids: None     Antibiotics given for GBS: No     Induction: misoprostol     Indications for induction:  Hypertension     Augmentation: oxytocin     Indications for augmentation: Ineffective Contraction Pattern     Labor complications: None     Additional complications:          Cervical ripenin2019 12:50 PM      Misoprostol          Delivery:      Episiotomy: None     Indication for Episiotomy:       Perineal Lacerations: 2nd Repaired:      Periurethral Laceration:   Repaired:     Labial Laceration:   Repaired:     Sulcus Laceration:   Repaired:     Vaginal Laceration:   Repaired:     Cervical Laceration:   Repaired:     Repair suture:       Repair # of packets: 1     Last Value - EBL - Nursing (mL):       Sum - EBL - Nursing (mL): 0     Last Value - EBL - Anesthesia (mL):      Calculated QBL (mL): 263      Vaginal Sweep Performed: Yes     Surgicount Correct: Yes       Other providers:        Anesthesia    Method:  Epidural          Details (if applicable):  Trial of Labor      Categorization:      Priority:     Indications for :     Incision Type:       Additional  information:  Forceps:    Vacuum:    Breech:    Observed anomalies    Other (Comments):

## 2019-11-15 NOTE — LACTATION NOTE
This note was copied from a baby's chart.  Pt went to Janie's class!! Pt was assisted with positioning the baby, once shown the correct hand  Placement, she was able to hold the baby and feed on her own.  Baby has a good latch and mother has everted nipples. Pt is doing very well with this so far.  With continued assistance and encouragement she should be very successful.

## 2019-11-16 VITALS
TEMPERATURE: 97 F | SYSTOLIC BLOOD PRESSURE: 128 MMHG | DIASTOLIC BLOOD PRESSURE: 90 MMHG | HEIGHT: 61 IN | HEART RATE: 98 BPM | RESPIRATION RATE: 18 BRPM | WEIGHT: 185 LBS | OXYGEN SATURATION: 98 % | BODY MASS INDEX: 34.93 KG/M2

## 2019-11-16 PROCEDURE — 99024 POSTOP FOLLOW-UP VISIT: CPT | Mod: ,,, | Performed by: ADVANCED PRACTICE MIDWIFE

## 2019-11-16 PROCEDURE — 99024 PR POST-OP FOLLOW-UP VISIT: ICD-10-PCS | Mod: ,,, | Performed by: ADVANCED PRACTICE MIDWIFE

## 2019-11-16 PROCEDURE — 25000003 PHARM REV CODE 250: Performed by: OBSTETRICS & GYNECOLOGY

## 2019-11-16 RX ORDER — IBUPROFEN 600 MG/1
600 TABLET ORAL EVERY 6 HOURS PRN
Qty: 30 TABLET | Refills: 3 | Status: SHIPPED | OUTPATIENT
Start: 2019-11-16 | End: 2020-01-06

## 2019-11-16 RX ADMIN — IBUPROFEN 600 MG: 600 TABLET ORAL at 05:11

## 2019-11-16 RX ADMIN — IBUPROFEN 600 MG: 600 TABLET ORAL at 01:11

## 2019-11-16 RX ADMIN — DOCUSATE SODIUM 100 MG: 100 CAPSULE, LIQUID FILLED ORAL at 09:11

## 2019-11-16 RX ADMIN — PRENATAL VIT W/ FE FUMARATE-FA TAB 27-0.8 MG 1 TABLET: 27-0.8 TAB at 09:11

## 2019-11-16 NOTE — PLAN OF CARE
Vitals WDL. Fundus is firm without  massage and 1 cm below umbilicus. Lochia is rubra and light. Perineum is swollen with 2nd degree lac repaired. Baby latched on with much assistance several times today. Mother complains entire time baby is at breast. Corrected latch numerous times however but baby clamped down on breast  so questioning effectiveness of latch. Baby is swallowing and hand expression with baby licking drops at breast. Prior to lactation seeing patient I hand expressed 5 mls and spoon fed to baby Assisted  patient with latching and hand expressing. Assisted her with double pumping and giving baby EBM with alternative method.Lactation in for feeding and pumping plan. Double pump at bedside in use Due to inadequate feedings at the breast, education provided on hand expression and pumping. Instructed to continue to pump 8 or more times in 24 hours. Discussed proper cleaning of breast pump parts and collection/ storage of expressed milk. Lactation notified of mothers troubles, will continue to work on feedings. Questions/ Concerns answered. Mother verbalizes understanding. Good bonding noted.Voiding well.Pain meds given as prescribed is managing pain well.

## 2019-11-16 NOTE — SUBJECTIVE & OBJECTIVE
Hospital course: PPD1 Routine care dc this PM     Interval History:    She is doing well this morning. She is tolerating a regular diet without nausea or vomiting. She is voiding spontaneously. She is ambulating. She has passed flatus, and has not a BM. Vaginal bleeding is mild. She denies fever or chills. Abdominal pain is mild and controlled with oral medications. She is breastfeeding..    Objective:     Vital Signs (Most Recent):  Temp: 96.4 °F (35.8 °C) (11/16/19 0745)  Pulse: 85 (11/16/19 0745)  Resp: 18 (11/16/19 0745)  BP: 120/81 (11/16/19 0745)  SpO2: 98 % (11/16/19 0745) Vital Signs (24h Range):  Temp:  [96.4 °F (35.8 °C)-97.8 °F (36.6 °C)] 96.4 °F (35.8 °C)  Pulse:  [85-90] 85  Resp:  [16-18] 18  SpO2:  [97 %-100 %] 98 %  BP: (105-120)/(63-81) 120/81     Weight: 83.9 kg (185 lb)  Body mass index is 34.96 kg/m².      Intake/Output Summary (Last 24 hours) at 11/16/2019 0934  Last data filed at 11/15/2019 1800  Gross per 24 hour   Intake 1150 ml   Output 1200 ml   Net -50 ml       Significant Labs:  Lab Results   Component Value Date    GROUPTRH O POS 11/13/2019    HEPBSAG Negative 04/04/2019    STREPBCULT No Group B Streptococcus isolated 10/31/2019     Recent Labs   Lab 11/14/19  1201   HGB 12.4   HCT 36.5*       I have personallly reviewed all pertinent lab results from the last 24 hours.    Physical Exam:   Constitutional: She is oriented to person, place, and time. She appears well-developed and well-nourished.    HENT:   Head: Normocephalic.    Eyes: Pupils are equal, round, and reactive to light.    Neck: Normal range of motion.    Cardiovascular: Normal rate and regular rhythm.     Pulmonary/Chest: Effort normal.        Abdominal: Soft.     Genitourinary: Vagina normal and uterus normal.           Musculoskeletal: Normal range of motion.       Neurological: She is alert and oriented to person, place, and time.    Skin: Skin is warm and dry.    Psychiatric: She has a normal mood and affect. Her  behavior is normal. Judgment and thought content normal.

## 2019-11-16 NOTE — PLAN OF CARE
Stable shift. Pt tolerated all meds and procedures well. V/S stable. NAD noted. See flow sheets for details. Pt up to bathroom w/o assistance needed. C/O pain w/ relief from pain meds. Pt attentive and appropriate w/ infant during shift. Assistance and reassurance needed w/ BF and pumping during shift. Plan of care reviewed w/ pt; pt states understanding.     Reinforced benefits of skin to skin at birth and throughout hospital stay.  Questions/ Concerns answered, Mother verbalizes understanding.    Used Breastfeeding Guide and reviewed first alert form, importance/ benefits of exclusive breastfeeding for 6 months, proper handling and storage of breast milk, and all resources available after leaving the hospital. Questions/ Concerns answered. Mother verbalized understanding.   Education provided on latch, positioning,milk transfer and importance of baby led feeding on cue (8 or more times daily) and use of hand expression. LATCH score complete. Reviewed the risk associated with use of pacifiers and reasons to avoid artificial nipples. Encouraged mother to use Breastfeeding Guide to track feedings and output. Questions/ Concerns answered. Mother verbalizes understanding.

## 2019-11-16 NOTE — LACTATION NOTE
This note was copied from a baby's chart.     11/16/19 8917   Maternal Information   Date of Referral 11/16/19   Person Making Referral nurse   Maternal Reason for Referral latch painful   Maternal Infant Feeding   Maternal Emotional State assist needed;anxious;other (see comments)  (worried about pain)   Infant Positioning clutch/football   Signs of Milk Transfer audible swallow;infant jaw motion present;suck/swallow ratio  (every other suck)   Pain with Feeding yes  (but much better and once reached 45 seconds comfortable)   Pain Location nipple, right   Pain Description soreness   Comfort Measures Before/During Feeding infant position adjusted;maternal position adjusted;suction broken using finger;latch adjusted   Comfort Measures Following Feeding air-drying encouraged;expressed milk applied;soap use discouraged;water cleansing encouraged   Nipple Shape After Feeding, Right everted   Latch Assistance yes   Breastfeeding Supplementation   Infant Indication for Supplementation other (see comments)  (painful latch mom has pumped milk)   Breastfeeding Supplementation Type expressed breast milk   Method of Supplementation SNS (supplemental nursing system)   Equipment Type   Breast Pump Type double electric, hospital grade   Breast Pump Flange Type hard   Breast Pump Flange Size other (see comments)  (30)   Breast Pumping   Breast Pumping Interventions early pumping promoted;frequent pumping encouraged;post-feed pumping encouraged  (or if no latch pump and give)   Lactation Referrals   Lactation Referrals outpatient lactation program;support group  (mom coming Monday after schedules MD appt. )   Outpatient Lactation Program Lactation Follow-up Date/Time discussed available times   Support Group Lactation Follow-up Date/Time 2019 flyer reminder     Worked with mom again today. Volumes right at lowest expected volumes for day in age but baby with weight, bili, and output meeting expectations. Mother states would feel  more comfortable going home with a back up plan. Safe formula feeding teaching completed and reasons to use discussed. Mother has requested just to have formula available @ home, education provided on the risk of formula feeding and risk of supplementation when breastfeeding. Listened to mothers concerns, honored mothers request. Education provided on alternative feeding methods/ formula feeding. Questions/ Concerns answered. Mother verbalized understanding. Able to get baby latched comfortably enough @ breast for mom to feed. Baby nursed well for 25 minutes and SNS fed 5ml mother already had pumped. Mom excited and praise and support provided. Used Breastfeeding Guide and reviewed first alert form, importance/ benefits of exclusive breastfeeding for 6 months, proper handling and storage of breast milk, and all resources available after leaving the hospital. Questions/ Concerns answered. Mother verbalized understanding.     Sending home with loaner pump. Will see in clinic Monday

## 2019-11-16 NOTE — DISCHARGE SUMMARY
[facility] Mother Baby Unit  Obstetrics  Discharge Summary      Patient Name: Keisha Mena  MRN: [id number]  Admission Date: 2019  Hospital Length of Stay: 2 days  Discharge Date and Time:  2019 9:34 AM  Attending Physician: Tea Schneider MD   Discharging Provider: Ca Vaz CNM   Primary Care Provider: Lisa Fung MD    HPI: No notes on file        * No surgery found *     Hospital Course:   PPD1 Routine care dc this PM          Final Active Diagnoses:    Diagnosis Date Noted POA    PRINCIPAL PROBLEM:   (spontaneous vaginal delivery) [O80] 11/15/2019 Not Applicable    Gestational hypertension [O13.9] 2019 Yes      Problems Resolved During this Admission:        Labs: All labs within the past 24 hours have been reviewed    Feeding Method: breast    Immunizations     None          Delivery:    Episiotomy: None   Lacerations: 2nd   Repair suture:     Repair # of packets: 1   Blood loss (ml):       Birth information:  YOB: 2021   Time of birth: 4:29 AM   Sex: male   Delivery type: Vaginal, Spontaneous   Gestational Age: 38w2d    Delivery Clinician:      Other providers:       Additional  information:  Forceps:    Vacuum:    Breech:    Observed anomalies      Living?:           APGARS  One minute Five minutes Ten minutes   Skin color:         Heart rate:         Grimace:         Muscle tone:         Breathing:         Totals: 3  7  9      Placenta: Delivered:       appearance    Pending Diagnostic Studies:     None          Discharged Condition: good    Disposition: Home or Self Care    Follow Up:  Follow-up Information     Follow up In 2 weeks.               Patient Instructions:      Notify your health care provider if you experience any of the following:  temperature >100.4     Notify your health care provider if you experience any of the following:  persistent nausea and vomiting or diarrhea     Notify your health care provider if you experience any of the  following:  difficulty breathing or increased cough     Notify your health care provider if you experience any of the following:  persistent dizziness, light-headedness, or visual disturbances     Notify your health care provider if you experience any of the following:  severe persistent headache     Medications:  Current Discharge Medication List      START taking these medications    Details   ibuprofen (ADVIL,MOTRIN) 600 MG tablet Take 1 tablet (600 mg total) by mouth every 6 (six) hours as needed (cramping).  Qty: 30 tablet, Refills: 3         CONTINUE these medications which have NOT CHANGED    Details   docusate sodium (COLACE) 100 MG capsule Take 100 mg by mouth 2 (two) times daily.      ferrous sulfate (FEOSOL) 325 mg (65 mg iron) Tab tablet Take 325 mg by mouth daily with breakfast.      PNV no.95/ferrous fum/folic ac (PRENATAL MULTIVITAMINS ORAL) Take by mouth.      valACYclovir (VALTREX) 1000 MG tablet Refills: 1             Ca Vaz CNM  Obstetrics  [facility]

## 2019-11-16 NOTE — LACTATION NOTE
This note was copied from a baby's chart.  To patient room for request for breastfeeding assistance, mother express want to attempt to LATCH infant but distressed about painful LATCH, offered reassurance and assistance, mother desired to attempt to use shield, infant LATCH well with shield, mother expresses comfortable LATCH with shield, swallows heard and colostrum noted to shield; infant  for 15minutes with shield, remained in room, educated mother on use of shield and importance to continue to pump after feedings with shield and cleaning instructions, mother verbalized understanding

## 2019-11-16 NOTE — PLAN OF CARE
"Routine visit completed. No risk factors for low supply. Baby with molding & cephalohematoma. PPV & suctioning after delivery. Biting action noted and mom reports "too painful" when latched. Started pumping and hand expressing. Will continue to offer support & encouragement.    "

## 2019-11-16 NOTE — PROGRESS NOTES
[facility] Mother Baby Unit  Obstetrics  Postpartum Progress Note    Patient Name: Keisha Mena  MRN: [id number]  Admission Date: 2019  Hospital Length of Stay: 2 days  Attending Physician: Tea Schneider MD  Primary Care Provider: Lisa Fung MD    Subjective:     Principal Problem: (spontaneous vaginal delivery)    Hospital course: PPD1 Routine care dc this PM     Interval History:    She is doing well this morning. She is tolerating a regular diet without nausea or vomiting. She is voiding spontaneously. She is ambulating. She has passed flatus, and has not a BM. Vaginal bleeding is mild. She denies fever or chills. Abdominal pain is mild and controlled with oral medications. She is breastfeeding..    Objective:     Vital Signs (Most Recent):  Temp: 96.4 °F (35.8 °C) (19)  Pulse: 85 (19)  Resp: 18 (19)  BP: 120/81 (19)  SpO2: 98 % (19) Vital Signs (24h Range):  Temp:  [96.4 °F (35.8 °C)-97.8 °F (36.6 °C)] 96.4 °F (35.8 °C)  Pulse:  [85-90] 85  Resp:  [16-18] 18  SpO2:  [97 %-100 %] 98 %  BP: (105-120)/(63-81) 120/81     Weight: 83.9 kg (185 lb)  Body mass index is 34.96 kg/m².      Intake/Output Summary (Last 24 hours) at 2019 0934  Last data filed at 11/15/2019 1800  Gross per 24 hour   Intake 1150 ml   Output 1200 ml   Net -50 ml       Significant Labs:  Lab Results   Component Value Date    GROUPTRH O POS 2019    HEPBSAG Negative 2019    STREPBCULT No Group B Streptococcus isolated 10/31/2019     Recent Labs   Lab 19  1201   HGB 12.4   HCT 36.5*       I have personallly reviewed all pertinent lab results from the last 24 hours.    Physical Exam:   Constitutional: She is oriented to person, place, and time. She appears well-developed and well-nourished.    HENT:   Head: Normocephalic.    Eyes: Pupils are equal, round, and reactive to light.    Neck: Normal range of motion.    Cardiovascular: Normal rate and  regular rhythm.     Pulmonary/Chest: Effort normal.        Abdominal: Soft.     Genitourinary: Vagina normal and uterus normal.           Musculoskeletal: Normal range of motion.       Neurological: She is alert and oriented to person, place, and time.    Skin: Skin is warm and dry.    Psychiatric: She has a normal mood and affect. Her behavior is normal. Judgment and thought content normal.       Assessment/Plan:     29 y.o. female  for:    No notes have been filed under this hospital service.  Service: Obstetrics and Gynecology      Disposition: As patient meets milestones, will plan to discharge TODAY .    Ca Vaz CNM  Obstetrics  [facility]

## 2019-11-16 NOTE — DISCHARGE INSTRUCTIONS
Postpartum Discharge Instructions:    · No heavy lifting, straining, frequent rest periods  · Pelvic rest--no douching, tampons, or intercourse until released by MD  · Talk to your doctor about birth control--remember breastfeeding is not a method of birth control  · Notify MD if bleeding becomes heavier than usual and if large clots, painful cramping,or foul odor develops.   Vaginal discharge will lighten and decrease in amount gradually.    · Cleanse perineal area from front to back after urination or having a bowel movement.  · Tub soak or portable sitz bath at home.  Apply clean pad with Epifoam and Tucks to perineal area.  · Episiotomy stitches will dissolve within 2-3 weeks  · If not breastfeeding, wear tight fitting sports bra for 1 week--remove only to bathe  · Remember to keep your breast clean and dry to prevent any cracking  · Notify MD if breast become reddened,swollen, nipples bleed or crack, or fever greater than 100.4  · Notify MD of pain, swelling,  Redness, or heat developing in back of leg especially when foot is flexed toward body  · Look at incision everyday for redness, swelling, or drainage which may indicate infection  · Notify MD of pain not relieved by pain medication.  · Call MD or go to ER for any concerns      After a Vaginal Birth  After having a baby, your body may be very tired. It can take time to recover from a vaginal delivery. You may stay in the hospital or birth center from 1 to 4 days. In some cases, you may be able to go home the same day.    Right after the delivery  Your temperature and blood pressure will be taken until they are stable. A nurse or other healthcare provider will observe you as you rest. You may have afterbirth pains. These are cramps caused by the uterus shrinking. Sanitary pads are used to soak up the discharge of the uterine lining. To make sure that you arent bleeding too much, the pad will be checked. And the firmness of your uterus will be checked. To  do this, a nurse will gently push down on your stomach. If you had anesthesia, youll be watched closely until you can feel and move your toes. If you have perineal pain (pain between the vagina and anus), an ice pack can help.   care  While still in the hospital or birth center, youll learn how to hold and feed your baby. You will also be given instructions on how to care for your baby. This includes bathing and feeding.   Preparing to go home  You may be anxious to go home as soon as possible. Before you and your baby go home, a healthcare provider will check to be sure you are healthy enough to take care of your baby and yourself. Youre ready to go home when:  · You can walk to the bathroom and use the bathroom without help.  · You can eat solid food and swallow pills (if needed).  · You have no sign of infection or other health problems, including fever.   · You have adequate pain control.  · Your bleeding isn't excessive.  · You are able to care for your  and are emotionally stable.  Before leaving the hospital or birth center, youll be given written instructions for home self-care after vaginal delivery. Be sure to follow these instructions carefully. If you have questions or concerns, talk about them now.  If you have stitches  You may have received stitches in the skin near your vagina. The stitches might have closed an episiotomy (an incision that enlarges the opening of the vagina). Or you may have needed stitches to repair torn skin. Either way, your stitches should dissolve within weeks. Until then, you can help reduce discomfort, aid healing, and reduce your risk of infection by keeping the stitches clean. These tips can help:  · Gently wipe from front to back after you urinate or have a bowel movement.  · After wiping, spray warm water on the area. Or you can have a sitz bath. This means sitting in a tub with a few inches of water in it. Then pat the area dry or use a hairdryer on a  cool setting.  · Do not use soap or any solution except water on the area.  · You can take a shower unless told not to.  · Change sanitary pads at least every 2 to 4 hours.  · Place cold or heat packs on the area as directed by your healthcare providers or nurses. Keep a thin towel between the pack and your skin.  · Sit on firm seats so the stitches pull less.   follow-up  Schedule a  follow-up exam with your healthcare provider for about 6 weeks after delivery. During this exam, your uterus and vaginal area will be checked. Contact your healthcare provider if you think you or your baby are having any problems.  When to call your healthcare provider  Call your health care provider right away if you have:  · A fever of 100.4°F (38.0°C) or higher  · Bleeding that requires a new sanitary pad after an hour, or large blood clots  · Pain in your vagina that gets worse and isn't relieved with medicine  · Swelling, discharge, or increased pain from vaginal tear or episiotomy  · Burning, pain, red streaks, or lumpy areas in your breasts that may be accompanied by flu-like symptoms  · Cracks, blisters, or blood on your nipples  · Burning or pain when you urinate  · Nausea or vomiting  · Dizziness or fainting  · Feelings of extreme sadness or anxiety, or a feeling that you dont want to be with your baby  · Abdominal pain that isnt relieved with medicine  · Vaginal discharge that has a bad odor  · No bowel movement for 5 days  · Painful urination, orinability to control urination  · Redness, warmth, or pain in the lower leg  · Chest pain   Date Last Reviewed: 2015-2017 [employer]. [address] [place] [id number]. All rights reserved. This information is not intended as a substitute for professional medical care. Always follow your healthcare professional's instructions.

## 2019-11-17 NOTE — PLAN OF CARE
Stable condition. VS WNL. Lungs clear. Pain is being managed with oral medications. Self ramu-care and reports light vaginal bleeding. Fundus firm without massage.Using Tucks and Dermaplast for comfort. Voiding without difficulty. Tolerating regular diet and drinking adequate amounts of fluids. Bonding appropriately with .      LACTATION NOTE: Assistance provided throughout shift with breastfeeding. Mother c/o pain when baby latches with or without nipple shield. I can easily hand express 5ml or more. Baby fed via finger feeding. Mother using double electric pump as instructed. Reviewed  Breastfeeding Guide and first alert form on discharge, importance/ benefits of exclusive breastfeeding for 6 months, proper handling and storage of breast milk, and all resources available after leaving the hospital. Questions/ Concerns answered. Mother verbalized understanding.      Discharge instructions given. F/U 2 week appointment made. Patient voiced understanding. Received copy of discharge paperwork, POST BIRTH handout, and Preeclampsia handout.

## 2019-11-18 ENCOUNTER — LACTATION CONSULT (OUTPATIENT)
Dept: OBSTETRICS AND GYNECOLOGY | Facility: CLINIC | Age: 29
End: 2019-11-18
Payer: COMMERCIAL

## 2019-11-18 ENCOUNTER — TELEPHONE (OUTPATIENT)
Dept: OBSTETRICS AND GYNECOLOGY | Facility: CLINIC | Age: 29
End: 2019-11-18

## 2019-11-18 NOTE — LACTATION NOTE
Mother- Baby Intake Form  Mother's name: Keisha Mena   Date: 19     OB/GYN: Sandi  :1990  Age: 29    Number of pregnancies: 1  Live Births: 1  Type of Delivery: Vaginal    Any Complications? High B/P    1. List any medications that you are currently taking: PNV, Fe, Colace      2. Do you have any health problems such as Thyroid, High Blood Pressure, High Blood Sugar, or PCOS? Gestational Hypertension      3. Did you lose more than normal amount of blood at delivery?  No    4. Do you have  History of anxiety or depressions? No      Have you been clinically treated for this? NA    5. Have you hand any breast or weight loss surgeries? No      Baby's Name:   Jam    : 11/15/[birth year]   Age:  3 days    Pediatrician: Abelardo   Gestational Age: 38 & 2      Birth Weight: 8#9.9oz     Discharge Weight:  8#9oz    List any health problems your baby had: Apgars 3, 7, & 9; Deep suctioning and O2 provided at delivery, Moulding, trouble latching  List any medications your baby is taking: None    IN THE PAST 24 HOURS:  How many times has your baby gone to breast, and fed?     1      Attempts?  5-7    Approximate length of feeding times:  20 minutes  Are you offering one breast or two per feeding?  Both, but still having trouble latching  How many times have you pumped in the last 24 hours? 7    After breastfeedin    In place of breastfeedin    What type of pump are you using? Symphony Loaner from the hospital   How much of the pumped milk was fed to your baby in the 24hrs? All of it   How much formula was fed to your baby in the last 24hrs? None   Type of formula: NA    How many wet diapers / 24 hours? 8       Number/Color of bowel movements in 24 hours?   0     LACTATION CONSULT-WORKSHEET    DATE: 19    Reason for visit: Trouble latching   Last weight: 8#9  Todays weight: 3630 grams/ 8#0 (same at MD office this morning)    TIME OF END OF LAST BREASTFEEDIN    BREASTFEEDIN    WEIGHT BEFORE  FEEDIN  WEIGHT AFTER L BREAST    3804     WEIGHT GAIN      +80               TOTAL BF INTAKE:  +80     PUMPING: Mother forgot pumping supplies and needs 30mm flanges for comfort- will pump when she gets home for right side cofort     ESTIMATED MATERNAL MILK YIELD/HR/DAY:  TOTAL BREAST MILK (FEED & PUMP)              80  cc  DIVIDE BY #HRS SINCE LAST FEED           3.75 hrs                                      = 21.33 cc/hr                   X 24 HRS                       = 512 cc/24hrs                                         = 17.06 OZ/DAY       INFANT NEEDS 21.7 OZ/DAY for weight but for day 3 only needs 4-8oz     MATERNAL MILK PRODUCED 17.06 Oz/day        LACTATION NOTE:  Mom, Dad, & 3 day old Jam here after MD visit with Dr. Zhou for scheduled OP Lactation Follow-up for difficult, painful latch. Baby with large cephalohematoma and moulding in hospital that is resolving. Baby with initial low apgars and needed deep suctioning and PPV O2 in delivery. Started feeding but extremely painful latch when able to get baby to latch. LC started mom pumping by 12 hours and finger feeding or spoon feeding all colostrum. Sent home with safe formula prep instructions but mother able to keep up with volumes per guide for day in age. Milk in this morning. Only able to latch baby 1 x in last 24 hours with attempts of  5-7x. Assistance provided in clinic and able to get baby latched successfully and comfortably for mom in football hold on left breast. Baby nursed for 20-25 minutes that side and transferred 80ml. Mom excited. Tried other side but could not wake baby to feed. Worked on  Mom positioning and aligning several times and viewed Latch video animation from [employer]. Dad took pic of mom with baby in correct position for viewing at home. SNS provided for help with finger feeding larger volumes when not lathing after parents discuss concerns about feeds at home. Mother praised for ability to keep baby  exclusive and anticipatory guidance on output now that volumes increased provided. Feeding volumes for each session as well as 24 hour period reviewed. Offered scheduling a follow-up but mom states would rather just call prn need. Feels much better now that milk in and baby latching better today. Resource #s provided and typed AVS reviewed and sent home with parents.        Consult time started: 1440  Consult time ended: 1610

## 2019-11-18 NOTE — PATIENT INSTRUCTIONS
"Recommended Interventions and Plan of Care for  Jam    Breastfeed baby  on cue until content at least 8-12 times in 24hrs.   Cluster feeds every 1-2hrs are common.    Use the asymmetric latch as demonstrated during the consult.   Go to www.[employer].ca and www.[employer].com - search [employer]'s "Attaching your baby @ the breast" to view at home.    Use breast compression during pauses in sucking as demonstrated during the consult.    Observe for signs of milk transfer to baby:   wide pauses in the sucks   swallows throughout  the feedings   milk on the babys lips when removed from the breast   wet nipple as it comes out of the babys mouth   heavy breasts before a feeding and softer breasts after the feeding    Try to latch the baby onto the breast until latch occurs or until 10-15 min. elapse.  If unable to comfortably latch the baby deeply onto the breasts, supplement the baby with your milk and pump the breasts until empty and store the milk for the next feeding.    Supplement the baby with your own breastmilk by  Alternative feeding method or paced bottle as a last resort    until the baby is content.    Pump with your Loaner pump for 10-30 min   After feeds if full or in place of feeding if unable to LATCH.     Treat engorgement:  use warmth for 10-15 min. with massage before every    feeding, soften the front of the breasts by expressing a small amount of milk    before latch attempts, latch baby onto breasts and nurse until content, use an  ice pack wrapped in a thin towel/pillow case  on breasts for 20min after every feeding.  Repeat with the babys cues or every 2hrs by waking baby until resolved.    Treat routine sore nipples:  correct positioning and latch on, break suction when baby removed from breast, rub expressed breastmilk  and/or lanolin into nipple after every feeding, begin feeding on least sore  nipple, use different positions.    Count and record the number of feedings, urine " diapers, and dirty diapers every 24hrs.    Clean all breastfeeding aids with warm soapy water after each use and sterilize each day.    Remember you can do supervised tummy time to help with facial muscles for better latching

## 2019-11-18 NOTE — TELEPHONE ENCOUNTER
Pt calling states insurance needed to know if being out of work was medically necessary prior to her delivery. Instructed pt that yes leave from work and delivering early was medically necessary.

## 2019-11-18 NOTE — TELEPHONE ENCOUNTER
----- Message from Gisele Palacios MA sent at 11/18/2019  8:13 AM CST -----  Contact: self      ----- Message -----  From: Kerline Quinn MA  Sent: 11/18/2019   8:06 AM CST  To: Wyatt Metcalf Staff    Keisha Mena  MRN: [id number]  Home Phone      [phone]  Work Phone      Not on file.  Mobile          [phone]    Patient Care Team:  Lisa Fung MD as PCP - General (Internal Medicine)  Marilu Hauser MD as Obstetrician (Obstetrics)  Erica Gill LPN as Care Coordinator  OB? No  What phone number can you be reached at?  [phone]  Message:   Needs to speak to nurse regarding FMLA paperwork.       Hill Tesfaye(Attending)

## 2019-11-20 NOTE — PROGRESS NOTES
Lactating mother in clinic today for lactation assistance. No physical assessment performed today. Baby under the care of . See nurses note

## 2019-11-22 ENCOUNTER — LACTATION CONSULT (OUTPATIENT)
Dept: OBSTETRICS AND GYNECOLOGY | Facility: CLINIC | Age: 29
End: 2019-11-22
Payer: COMMERCIAL

## 2019-11-22 ENCOUNTER — TELEPHONE (OUTPATIENT)
Dept: OBSTETRICS AND GYNECOLOGY | Facility: CLINIC | Age: 29
End: 2019-11-22

## 2019-11-22 NOTE — TELEPHONE ENCOUNTER
Breast pump order received and signed by Dr. Junior. Faxed to [facility], confirmation received. Scanned to pt chart.

## 2019-11-22 NOTE — PATIENT INSTRUCTIONS
Continue to feed on cue  He needs 23 oz in 24 hours  Paced bottle feeding technique shown in office  Attempt shield at breast if not latching

## 2019-11-22 NOTE — TELEPHONE ENCOUNTER
----- Message from Nini Uribe sent at 11/20/2019  4:53 PM CST -----  Contact: [phone]  Would like to schedule an appt with  this Friday for lactation.

## 2019-11-22 NOTE — PROGRESS NOTES
Mother- Baby Follow-up Intake form    Baby's Name:  Jam     :  [birth year]      Age:  7 days    Pediatrician: Abelardo  Gestational Age: 38       Birth Weight:  8#9.9      Discharge Weight:  8#9     List other weights and when they were taken:  Date:  19 Weight:  8#0     IN THE PAST 24 HOURS:  How many times has your baby gone to breast, and fed?      2     Attempts?  5      Approximate length of feeding times:   5 minutes    Are you offering one breast or two per feeding? 2     How many times have you pumped in the last 24 hours?   8     After breastfeedin        In place of breastfeedin         What type of pump are you using? Symphony Loaner    How much of the pumped milk was fed to your baby in the 24hrs? 140ml   How much formula was fed to your baby in the last 24hrs? NONE      Type of formula:  NA     How many wet diapers / 24 hours?  8      Number/Color of bowel movements in 24 hours?   4-5 yellow     LACTATION CONSULT-WORKSHEET    DATE: 19 @ 330pm    Reason for visit: Mom calls for help with LATCH  Last weight: 8#0  Todays weight: 8#9.3 oz    BREASTFEEDIN    WEIGHT BEFORE FEEDIN  WEIGHT AFTER L& R BREAST   3936     WEIGHT GAIN      +42  WEIGHT AFTER L& R BREAST   3970     WEIGHT GAIN      +34              TOTAL BF INTAKE:  76 + did 20ml SNS during feeding- Baby also used shield for some of feeding and transferred 34 via pre & post weights with shield. Transferred more without the shield but mother has difficulty achieving deep latch.     INFANT NEEDS 23-23.5oz/DAY    LACTATION NOTE:  Mom sent my chart message with desire to schedule a follow-up Lactation visit today. Feels she is having most difficulty with timing feeds/latches and baby not being to hungry to be patient with trying. Desires information on pace bottle feeding today. Feels SNS is slow and prefers finger feeding with syringes and feeding tubes. Provided with demo and supplies. LC assists and baby is able  to latch confortably and transfer 42ml. Mom attempts several times own on but feels she is unable to et as deep and is more uncomfortable. Attempted with shield. Mom able to get on and keep on for 12 minutes and baby transfers well. Fed another volume at breast via SNS to demo as well. Paced bottle feeding demo completed as well. Parents feel pleased to have more options and baby remains exclusive to breastmilk. Mom able to keep up with needs plus has frozen some milk because she is ahead of the baby. Dad continues to be a support.       Consult time started:1525  Consult time ended: 1645

## 2019-11-26 ENCOUNTER — POSTPARTUM VISIT (OUTPATIENT)
Dept: OBSTETRICS AND GYNECOLOGY | Facility: CLINIC | Age: 29
End: 2019-11-26
Payer: COMMERCIAL

## 2019-11-26 ENCOUNTER — TELEPHONE (OUTPATIENT)
Dept: OBSTETRICS AND GYNECOLOGY | Facility: CLINIC | Age: 29
End: 2019-11-26

## 2019-11-26 VITALS
RESPIRATION RATE: 18 BRPM | DIASTOLIC BLOOD PRESSURE: 92 MMHG | SYSTOLIC BLOOD PRESSURE: 142 MMHG | TEMPERATURE: 99 F | HEIGHT: 61 IN | BODY MASS INDEX: 30.96 KG/M2 | WEIGHT: 164 LBS | HEART RATE: 79 BPM

## 2019-11-26 DIAGNOSIS — R03.0 ELEVATED BLOOD PRESSURE READING: Primary | ICD-10-CM

## 2019-11-26 PROCEDURE — 99999 PR PBB SHADOW E&M-EST. PATIENT-LVL III: CPT | Mod: PBBFAC,,, | Performed by: ADVANCED PRACTICE MIDWIFE

## 2019-11-26 PROCEDURE — 99999 PR PBB SHADOW E&M-EST. PATIENT-LVL III: ICD-10-PCS | Mod: PBBFAC,,, | Performed by: ADVANCED PRACTICE MIDWIFE

## 2019-11-26 PROCEDURE — 99214 OFFICE O/P EST MOD 30 MIN: CPT | Mod: 24,S$GLB,, | Performed by: ADVANCED PRACTICE MIDWIFE

## 2019-11-26 PROCEDURE — 99214 PR OFFICE/OUTPT VISIT, EST, LEVL IV, 30-39 MIN: ICD-10-PCS | Mod: 24,S$GLB,, | Performed by: ADVANCED PRACTICE MIDWIFE

## 2019-11-26 RX ORDER — NIFEDIPINE 30 MG/1
30 TABLET, EXTENDED RELEASE ORAL DAILY
Qty: 30 TABLET | Refills: 5 | Status: SHIPPED | OUTPATIENT
Start: 2019-11-26 | End: 2019-12-30

## 2019-11-26 RX ORDER — LABETALOL 200 MG/1
200 TABLET, FILM COATED ORAL 2 TIMES DAILY
Qty: 60 TABLET | Refills: 5 | Status: SHIPPED | OUTPATIENT
Start: 2019-11-26 | End: 2019-11-26 | Stop reason: CLARIF

## 2019-11-26 NOTE — PROGRESS NOTES
"Pt presents today with co "passing 2 clots" this morning quarter size, breast tenderness bilaterally and head ache. Reports her anxiety level is up and her ha is tension like.     DTR S +2 NO CLONUS NO RIGHT EPIGASTRIC PAIN.   Pelvic exam: FF 2BU, SMALL AMOUNT OF VAG BLEEDING NOTED   Breast:  NO redness tender to touch, appears that 2 clogged ducts are present, educated the pt to try to express her breast in the shower.     P:Discussed case with Dr Zaragoza and he was in agreement with Procardia 30 mg XL, BP check on Friday, pre e estrada today, and pre e precautions       "

## 2019-11-26 NOTE — TELEPHONE ENCOUNTER
Postpartum  11/15/19, reports B/P last night was 156/110 and this morning 148/102. Patient does report that she has a headache and does not feel well. Patient is not on any B/P medications. Appointment scheduled for today to be evaluated.

## 2019-11-29 ENCOUNTER — POSTPARTUM VISIT (OUTPATIENT)
Dept: OBSTETRICS AND GYNECOLOGY | Facility: CLINIC | Age: 29
End: 2019-11-29
Payer: COMMERCIAL

## 2019-11-29 VITALS
BODY MASS INDEX: 30.4 KG/M2 | HEIGHT: 61 IN | RESPIRATION RATE: 13 BRPM | HEART RATE: 68 BPM | SYSTOLIC BLOOD PRESSURE: 124 MMHG | WEIGHT: 161 LBS | DIASTOLIC BLOOD PRESSURE: 82 MMHG

## 2019-11-29 DIAGNOSIS — Z13.31 DEPRESSION SCREENING NEGATIVE: ICD-10-CM

## 2019-11-29 DIAGNOSIS — Z01.30 BLOOD PRESSURE CHECK: ICD-10-CM

## 2019-11-29 PROCEDURE — 0503F PR POSTPARTUM CARE VISIT: ICD-10-PCS | Mod: S$GLB,,, | Performed by: OBSTETRICS & GYNECOLOGY

## 2019-11-29 PROCEDURE — 99999 PR PBB SHADOW E&M-EST. PATIENT-LVL III: ICD-10-PCS | Mod: PBBFAC,,, | Performed by: OBSTETRICS & GYNECOLOGY

## 2019-11-29 PROCEDURE — 99999 PR PBB SHADOW E&M-EST. PATIENT-LVL III: CPT | Mod: PBBFAC,,, | Performed by: OBSTETRICS & GYNECOLOGY

## 2019-11-29 PROCEDURE — 0503F POSTPARTUM CARE VISIT: CPT | Mod: S$GLB,,, | Performed by: OBSTETRICS & GYNECOLOGY

## 2019-11-29 RX ORDER — LABETALOL 200 MG/1
TABLET, FILM COATED ORAL
COMMUNITY
Start: 2019-11-26 | End: 2020-01-06

## 2019-11-29 NOTE — PROGRESS NOTES
CC: Post-partum follow-up    Keisha Mena is a 29 y.o. female  presents for a postpartum visit.  She is status post   2 weeks ago.  Her hospitalization was not complicated.  She is breastfeeding.  She desires no method for contraception.  She denies postpartum depression. She and the baby are doing well.  No pain.  No fever.   No bowel / bladder complaints. Pregnancy was complicated by: none.    She is having some difficulty with breastfeeding, her son is not latching well and he is being paced bottle fed pumped milk. She was seen 3 days ago with headache and breast tenderness. She was placed on Procardia for elevated BPs and BPs are much better controlled now. She was also diagnosed with clogged milk ducts but reports she has massaged those out. She is followed by lactation. Depression screening negative.       Her last pap was 2018      ROS:  GENERAL: No fever, chills, fatigability.  VULVAR: No pain, no lesions and no itching.  VAGINAL: No relaxation, no itching, no discharge, no abnormal bleeding and no lesions.  ABDOMEN: No abdominal pain. Denies nausea. Denies vomiting. No diarrhea. No constipation  BREAST: Denies pain. No lumps. No discharge.  URINARY: No incontinence, no nocturia, no frequency and no dysuria.  CARDIOVASCULAR: No chest pain. No shortness of breath. No leg cramps.  NEUROLOGICAL: No headaches. No vision changes.    History reviewed. No pertinent past medical history.  History reviewed. No pertinent surgical history.  Review of patient's allergies indicates:  No Known Allergies    Current Outpatient Medications:     docusate sodium (COLACE) 100 MG capsule, Take 100 mg by mouth 2 (two) times daily., Disp: , Rfl:     ferrous sulfate (FEOSOL) 325 mg (65 mg iron) Tab tablet, Take 325 mg by mouth daily with breakfast., Disp: , Rfl:     ibuprofen (ADVIL,MOTRIN) 600 MG tablet, Take 1 tablet (600 mg total) by mouth every 6 (six) hours as needed (cramping)., Disp: 30 tablet, Rfl: 3     NIFEdipine (PROCARDIA-XL) 30 MG (OSM) 24 hr tablet, Take 1 tablet (30 mg total) by mouth once daily., Disp: 30 tablet, Rfl: 5    PNV no.95/ferrous fum/folic ac (PRENATAL MULTIVITAMINS ORAL), Take by mouth., Disp: , Rfl:     labetalol (NORMODYNE) 200 MG tablet, , Disp: , Rfl:       Vitals:    19 1459   BP: 124/82   Pulse: 68   Resp: 13         PE: deferred    Assessment:    1. Normal postpartum exam  2. Doing well S/P       Plan:    1. RTC in 4 weeks or prn

## 2019-12-18 ENCOUNTER — TELEPHONE (OUTPATIENT)
Dept: OBSTETRICS AND GYNECOLOGY | Facility: CLINIC | Age: 29
End: 2019-12-18

## 2019-12-18 ENCOUNTER — LACTATION CONSULT (OUTPATIENT)
Dept: OBSTETRICS AND GYNECOLOGY | Facility: CLINIC | Age: 29
End: 2019-12-18
Payer: COMMERCIAL

## 2019-12-18 VITALS — SYSTOLIC BLOOD PRESSURE: 122 MMHG | DIASTOLIC BLOOD PRESSURE: 70 MMHG | TEMPERATURE: 98 F

## 2019-12-18 DIAGNOSIS — Z91.89 BREASTFEEDING PROBLEM: Primary | ICD-10-CM

## 2019-12-18 NOTE — TELEPHONE ENCOUNTER
Pt notified. States saw lactation today, bp ok. Informed pt to rest and monitor bp. If still elevated to contact clinic and will schedule appt tomorrow for evaluation. Pt voiced understanding.

## 2019-12-18 NOTE — TELEPHONE ENCOUNTER
5 week postpartum currently on Procardia XL 30 mg at night. Patient states that 2 days ago she had a headache and didn't feel right so she checked her B/P and it was 135/95. She checked it again last night and it was 152/105 and this morning was 135/101. Patient states that prior to this her B/P readings have been normal. Patient states that the only change is that she got sick and was started on a Zpak and Singulair this week. Patient states that her B/P is normal when she feels ok. Patient would like to know what Dr Junior recommends.

## 2019-12-18 NOTE — TELEPHONE ENCOUNTER
----- Message from Jammie Ashford MA sent at 12/18/2019  8:59 AM CST -----  Contact: self      ----- Message -----  From: Kerline Quinn MA  Sent: 12/18/2019   8:51 AM CST  To: Wyatt Metcalf Staff    Keisha Mena  MRN: [id number]  Home Phone      [phone]  Work Phone      Not on file.  Mobile          [phone]    Patient Care Team:  Lisa Fung MD as PCP - General (Internal Medicine)  Marilu Hauser MD as Obstetrician (Obstetrics)  Erica Gill LPN as Care Coordinator  OB? No  What phone number can you be reached at?   [phone]  Message:  Needs to speak to nurse regarding high bp while taking high bp.

## 2019-12-18 NOTE — LACTATION NOTE
Mother- Baby Intake Form  Mother's name: Keisha Mena   Date: 19     OB/GYN: Sandi Age:29    Number of pregnancies: 1  Live Births: 1  Type of Delivery: Vaginal    Any Complications? Gestational Hypertension    1. List any medications that you are currently taking: PNV, Stool Softner, Iron, & new BP medicine- Nifedipine 30mg      2. Do you have any health problems such as Thyroid, High Blood Pressure, High Blood Sugar, or PCOS? Yes Blood pressure      3. Did you lose more than normal amount of blood at delivery? No      4. Do you have  History of anxiety or depressions? Yes      Have you been clinically treated for this? No    5. Have you hand any breast or weight loss surgeries? No      Baby's Name:    Jam   :  [birth year] Age: 1 month     Pediatrician:    Abelardo  Gestational Age:  38 & 2        Birth Weight: 8#9.9oz    Discharge Weight:  8#9     List any health problems your baby had:  Admitted to [facility] for RSV for 1 week     List any medications your baby is taking:  None    IN THE PAST 24 HOURS:  How many times has your baby gone to breast, and fed?       2    Attempts?   3     Approximate length of feeding times:  15   Are you offering one breast or two per feeding?  1    How many times have you pumped in the last 24 hours?  7      After breastfeedin        In place of breastfeedin        What type of pump are you using? Medela & spectra   How much of the pumped milk was fed to your baby in the 24hrs? 24-30 oz How much formula was fed to your baby in the last 24hrs? None   Type of formula: NA      How many wet diapers / 24 hours?    12    Number/Color of bowel movements in 24 hours?   8 yellow     LACTATION CONSULT-WORKSHEET    DATE: 19    Reason for visit: Plugged ducts unresolved by feeding/pumping    Todays weight: Baby not here today- Just mom      PUMPING:  LEFT BREAST VOLUME: 2.5oz   RIGHT BREAST VOLUME: 2 oz TOTAL PUMPED VOLUME:  4.5    LACTATION NOTE:  Here today after self  "referral for plugged ducts that started 5 days ago.  States had redness and fever of 100.9 then, after leaving hospital in [facility] with baby when she went from 8 pumps per 24 hours to 3 pumps in 24 hours. Started pumping and felt better except right breast staying with 2 distinct "plugged ducts."  Checked today. No redness. Afebrile when checked. BP stable. 2 palpable quarter size areas noted at the 12 o'clock and 3 o'clock positions of the right breast. Heat and massage to both breasts, then pumped with our DCS Symphony for 30 minutes low suction fast speed. Mom had multiple let downs and used hands on pumping technique during pumping session. Areas of concern smaller after pumping but still palpable to right breast at the 12 o'clock and 3 o'clock positions.  Discussed if feeding baby use cross cradle positioning for chin to help empty the breast.  If no relief with baby pump after warmth. Ice after pumping to those areas, and continue to use ibuprofen to ease swelling. S/S Mastitis reviewed and told to call clinic if any fever, redness start.         Consult time started:1330  Consult time ended: 1415    "

## 2019-12-18 NOTE — PATIENT INSTRUCTIONS
"Continue to pump 8 x per 24 hours- low suction and keep on stimulation mode if milk not flowing to help with let downs  Go to "[facility]"  nursery and look for "maximizing milk production with your hands" This will show you hands on pumping and how to empty the breast like we did in the office  Continue with heat before, ice after, & ibuprofen as prescribed  Call for fever, chills, achiness/flu like symptoms or if breast becomes red  Continue Lecithin for recurrent plugged ducts  If breastfeeding aim baby's chin toward the plugged area- so cross cradle or cradle hold on right breast  "

## 2019-12-30 ENCOUNTER — TELEPHONE (OUTPATIENT)
Dept: OBSTETRICS AND GYNECOLOGY | Facility: CLINIC | Age: 29
End: 2019-12-30

## 2019-12-30 ENCOUNTER — POSTPARTUM VISIT (OUTPATIENT)
Dept: OBSTETRICS AND GYNECOLOGY | Facility: CLINIC | Age: 29
End: 2019-12-30
Payer: COMMERCIAL

## 2019-12-30 VITALS
HEIGHT: 61 IN | DIASTOLIC BLOOD PRESSURE: 100 MMHG | RESPIRATION RATE: 10 BRPM | WEIGHT: 158 LBS | BODY MASS INDEX: 29.83 KG/M2 | HEART RATE: 88 BPM | SYSTOLIC BLOOD PRESSURE: 140 MMHG

## 2019-12-30 DIAGNOSIS — Z13.31 DEPRESSION SCREENING NEGATIVE: ICD-10-CM

## 2019-12-30 DIAGNOSIS — R03.0 ELEVATED BLOOD PRESSURE READING: ICD-10-CM

## 2019-12-30 PROCEDURE — 0503F POSTPARTUM CARE VISIT: CPT | Mod: S$GLB,,, | Performed by: OBSTETRICS & GYNECOLOGY

## 2019-12-30 PROCEDURE — 99999 PR PBB SHADOW E&M-EST. PATIENT-LVL III: CPT | Mod: PBBFAC,,, | Performed by: OBSTETRICS & GYNECOLOGY

## 2019-12-30 PROCEDURE — 99999 PR PBB SHADOW E&M-EST. PATIENT-LVL III: ICD-10-PCS | Mod: PBBFAC,,, | Performed by: OBSTETRICS & GYNECOLOGY

## 2019-12-30 PROCEDURE — 0503F PR POSTPARTUM CARE VISIT: ICD-10-PCS | Mod: S$GLB,,, | Performed by: OBSTETRICS & GYNECOLOGY

## 2019-12-30 RX ORDER — NIFEDIPINE 60 MG/1
60 TABLET, EXTENDED RELEASE ORAL DAILY
Qty: 30 TABLET | Refills: 3 | Status: SHIPPED | OUTPATIENT
Start: 2019-12-30 | End: 2020-01-06

## 2019-12-30 RX ORDER — ACETAMINOPHEN AND CODEINE PHOSPHATE 120; 12 MG/5ML; MG/5ML
1 SOLUTION ORAL DAILY
Qty: 28 TABLET | Refills: 11 | Status: SHIPPED | OUTPATIENT
Start: 2019-12-30 | End: 2020-01-20

## 2019-12-30 NOTE — TELEPHONE ENCOUNTER
----- Message from Jammie Ashford MA sent at 12/30/2019  3:42 PM CST -----  Contact: self      ----- Message -----  From: Kerline Quinn MA  Sent: 12/30/2019   1:31 PM CST  To: Wyatt Metcalf Staff    Keisha Mena  MRN: [id number]  Home Phone      [phone]  Work Phone      Not on file.  Mobile          [phone]    Patient Care Team:  Lisa Fung MD as PCP - General (Internal Medicine)  Marilu Hauser MD as Obstetrician (Obstetrics)  Erica Gill LPN as Care Coordinator  OB? No  What phone number can you be reached at?   [phone]  Message:   Stated was advised by Dr Junior to call with PCP appt date.  Stated is scheduled to see Dr Fung on 01/07/20.

## 2019-12-30 NOTE — PROGRESS NOTES
CC: Post-partum follow-up    Keisha Mena is a 29 y.o. female  presents for a postpartum visit.  She is status post   6 weeks ago.  Her hospitalization was not complicated.  She is breastfeeding.  She desires no method for contraception.  She denies postpartum depression. She and the baby are doing well.  No pain.  No fever.   No bowel / bladder complaints. Pregnancy was complicated by: none.     She was placed on Procardia for elevated BPs ~1 week after delivery but still reports symptoms when BP continues to be elevated.     Her last pap was 2018      ROS:  GENERAL: No fever, chills, fatigability.  VULVAR: No pain, no lesions and no itching.  VAGINAL: No relaxation, no itching, no discharge, no abnormal bleeding and no lesions.  ABDOMEN: No abdominal pain. Denies nausea. Denies vomiting. No diarrhea. No constipation  BREAST: Denies pain. No lumps. No discharge.  URINARY: No incontinence, no nocturia, no frequency and no dysuria.  CARDIOVASCULAR: No chest pain. No shortness of breath. No leg cramps.  NEUROLOGICAL: No headaches. No vision changes.    History reviewed. No pertinent past medical history.  History reviewed. No pertinent surgical history.  Review of patient's allergies indicates:  No Known Allergies    Current Outpatient Medications:     docusate sodium (COLACE) 100 MG capsule, Take 100 mg by mouth 2 (two) times daily., Disp: , Rfl:     ferrous sulfate (FEOSOL) 325 mg (65 mg iron) Tab tablet, Take 325 mg by mouth daily with breakfast., Disp: , Rfl:     ibuprofen (ADVIL,MOTRIN) 600 MG tablet, Take 1 tablet (600 mg total) by mouth every 6 (six) hours as needed (cramping). (Patient not taking: Reported on 2019), Disp: 30 tablet, Rfl: 3    labetalol (NORMODYNE) 200 MG tablet, , Disp: , Rfl:     NIFEdipine (PROCARDIA XL) 60 MG (OSM) 24 hr tablet, Take 1 tablet (60 mg total) by mouth once daily., Disp: 30 tablet, Rfl: 3    PNV no.95/ferrous fum/folic ac (PRENATAL MULTIVITAMINS ORAL),  Take by mouth., Disp: , Rfl:       Vitals:    19 1237   BP: (!) 140/100   Pulse: 88   Resp: 10         Pelvic exam: VULVA: normal appearing vulva with no masses, tenderness or lesions, VAGINA: normal appearing vagina with normal color and discharge, no lesions, CERVIX: normal appearing cervix without discharge or lesions, UTERUS: uterus is normal size, shape, consistency and nontender, ADNEXA: normal adnexa in size, nontender and no masses.      Assessment:    1. Normal postpartum exam  2. Doing well S/P       Plan:    1. Release from delivery  2. Increase procardia to 60mg daily  3. Instructed to follow up with PCP for continued elevated BPs

## 2020-01-06 ENCOUNTER — OFFICE VISIT (OUTPATIENT)
Dept: INTERNAL MEDICINE | Facility: CLINIC | Age: 30
End: 2020-01-06
Payer: COMMERCIAL

## 2020-01-06 VITALS
SYSTOLIC BLOOD PRESSURE: 142 MMHG | OXYGEN SATURATION: 99 % | RESPIRATION RATE: 18 BRPM | WEIGHT: 156.5 LBS | HEART RATE: 85 BPM | BODY MASS INDEX: 29.55 KG/M2 | HEIGHT: 61 IN | DIASTOLIC BLOOD PRESSURE: 94 MMHG

## 2020-01-06 DIAGNOSIS — F41.9 ANXIETY: ICD-10-CM

## 2020-01-06 DIAGNOSIS — O13.9 GESTATIONAL HYPERTENSION, ANTEPARTUM: Primary | ICD-10-CM

## 2020-01-06 PROCEDURE — 99214 PR OFFICE/OUTPT VISIT, EST, LEVL IV, 30-39 MIN: ICD-10-PCS | Mod: S$GLB,,, | Performed by: INTERNAL MEDICINE

## 2020-01-06 PROCEDURE — 99999 PR PBB SHADOW E&M-EST. PATIENT-LVL III: CPT | Mod: PBBFAC,,, | Performed by: INTERNAL MEDICINE

## 2020-01-06 PROCEDURE — 99214 OFFICE O/P EST MOD 30 MIN: CPT | Mod: S$GLB,,, | Performed by: INTERNAL MEDICINE

## 2020-01-06 PROCEDURE — 99999 PR PBB SHADOW E&M-EST. PATIENT-LVL III: ICD-10-PCS | Mod: PBBFAC,,, | Performed by: INTERNAL MEDICINE

## 2020-01-06 RX ORDER — HYDROXYZINE PAMOATE 25 MG/1
25 CAPSULE ORAL EVERY 8 HOURS PRN
Qty: 60 CAPSULE | Refills: 1 | Status: SHIPPED | OUTPATIENT
Start: 2020-01-06 | End: 2021-01-11

## 2020-01-06 RX ORDER — NIFEDIPINE 90 MG/1
90 TABLET, FILM COATED, EXTENDED RELEASE ORAL DAILY
Qty: 30 TABLET | Refills: 11 | Status: SHIPPED | OUTPATIENT
Start: 2020-01-06 | End: 2020-03-04 | Stop reason: SDUPTHER

## 2020-01-06 NOTE — PROGRESS NOTES
"Subjective:       Patient ID: Keisha Mena is a 29 y.o. female.    Chief Complaint: Hypertension and Anxiety      HPI:  Patient is known to me and presents with elevated blood pressure. She was induced at 38 weeks and at time of delivery had elevated blood pressure. Improved but about 1 week after delivery her BP was elevated at 150 systolic. She was started on nifedipine 30mg, then increased to 60mg last week. HTN does run in her family but she has no prior h/o HTN.     She is dealing with some anxiety post delivery. Baby in November-he had RSV at 2 weeks old and hospitalized. She has mastitis and then her BP started rising,  went back to work. She fell down holding her baby and this scared her. She is exclusively pumping. She is alone most of the day and feeling stressed. "I don't feel like myself. I don't feel depressed" Just feeling overwhelmed.     History reviewed. No pertinent past medical history.    Family History   Problem Relation Age of Onset    Hypertension Mother     Alcohol abuse Father         history of s/p liver transplant    Breast cancer Neg Hx     Colon cancer Neg Hx     Ovarian cancer Neg Hx        Social History     Socioeconomic History    Marital status:      Spouse name: Not on file    Number of children: Not on file    Years of education: Not on file    Highest education level: Not on file   Occupational History    Not on file   Social Needs    Financial resource strain: Not on file    Food insecurity:     Worry: Not on file     Inability: Not on file    Transportation needs:     Medical: Not on file     Non-medical: Not on file   Tobacco Use    Smoking status: Never Smoker    Smokeless tobacco: Never Used   Substance and Sexual Activity    Alcohol use: No     Frequency: Never    Drug use: No    Sexual activity: Yes     Partners: Male     Comment:    Lifestyle    Physical activity:     Days per week: Not on file     Minutes per session: Not on file    " Stress: Not on file   Relationships    Social connections:     Talks on phone: Not on file     Gets together: Not on file     Attends [employer]ism service: Not on file     Active member of club or organization: Not on file     Attends meetings of clubs or organizations: Not on file     Relationship status: Not on file   Other Topics Concern    Not on file   Social History Narrative    Not on file       Review of Systems   Constitutional: Negative for activity change, fatigue, fever and unexpected weight change.   HENT: Negative for congestion, ear pain, hearing loss, rhinorrhea and sore throat.    Eyes: Negative for redness and visual disturbance.   Respiratory: Negative for cough, shortness of breath and wheezing.    Cardiovascular: Negative for chest pain, palpitations and leg swelling.   Gastrointestinal: Negative for abdominal pain, constipation, diarrhea, nausea and vomiting.   Genitourinary: Negative for dysuria, frequency and urgency.   Musculoskeletal: Negative for back pain, joint swelling and neck pain.   Skin: Negative for color change, rash and wound.   Neurological: Negative for dizziness, tremors, weakness, light-headedness and headaches.   Psychiatric/Behavioral: The patient is nervous/anxious.          Objective:      Physical Exam   Constitutional: She is oriented to person, place, and time. She appears well-developed and well-nourished. No distress.   HENT:   Head: Normocephalic and atraumatic.   Right Ear: External ear normal.   Left Ear: External ear normal.   Eyes: Pupils are equal, round, and reactive to light. Conjunctivae and EOM are normal. Right eye exhibits no discharge. Left eye exhibits no discharge.   Neck: Neck supple. No tracheal deviation present.   Cardiovascular: Normal rate and regular rhythm. Exam reveals no gallop and no friction rub.   No murmur heard.  Pulmonary/Chest: Effort normal and breath sounds normal. No respiratory distress. She has no wheezes. She has no rales.    Abdominal: Soft. Bowel sounds are normal. She exhibits no distension. There is no tenderness.   Neurological: She is alert and oriented to person, place, and time. No cranial nerve deficit.   Skin: Skin is warm and dry.   Psychiatric: She has a normal mood and affect. Her behavior is normal.   Vitals reviewed.      Assessment:       1. Gestational hypertension, antepartum    2. Anxiety        Plan:       Keisha was seen today for hypertension and anxiety.    Diagnoses and all orders for this visit:    Gestational hypertension, antepartum  -     NIFEdipine (ADALAT CC) 90 MG TbSR; Take 1 tablet (90 mg total) by mouth once daily.  Options discussed for treatment  Home pulse 60-90 but mostly 60-70s so will hold off adding BB for now  Increase nifedipine. Her home average is 130s systolic so she is not far off from her goal  Renal fxn normal  Continue weight loss, healthy diet, low Na, exercise    Anxiety  -     hydrOXYzine pamoate (VISTARIL) 25 MG Cap; Take 1 capsule (25 mg total) by mouth every 8 (eight) hours as needed.  Discussed SSRI/SNRI treatment post partum. She would like to try to avoid.  Will trial vistaril. Discussed possible sedation so will have to monitor for this with new baby in house  Exercise  Increase sleep is difficult with  working away from home    RTC 2 weeks and PRN

## 2020-01-09 ENCOUNTER — TELEPHONE (OUTPATIENT)
Dept: INTERNAL MEDICINE | Facility: CLINIC | Age: 30
End: 2020-01-09

## 2020-01-09 NOTE — TELEPHONE ENCOUNTER
Her blood pressure is perfect. Pulse is a bit on the high side but not extraordinarily high that I think it would cause her symptoms. Could be a side effect of the increased dose. If we decrease her dose I think we may have to add another medication like labetalol to keep her pressure controlled. Does she want to do this or stay with her current medication?

## 2020-01-09 NOTE — TELEPHONE ENCOUNTER
Spoke with patient about Dr. Fung's recommendations and advise. Patient states that she will give it one more day to see how she feels. If no better will call tomorrow about the labetalol.

## 2020-01-09 NOTE — TELEPHONE ENCOUNTER
Patient states she is on 90 mg of nifedipine and she has been feeling weak and light headed. States that her face is also warm. Checked BP and it was 127/83 and pulse was 103.    Please advise.

## 2020-01-09 NOTE — TELEPHONE ENCOUNTER
----- Message from Barb Hagen sent at 2020  1:31 PM CST -----  Contact: Self  Keisha Mena  MRN: [id number]  : 1990  PCP: Lisa Fung  Home Phone      [phone]  Work Phone      Not on file.  Mobile          [phone]    MESSAGE:     Would like to speak to nurse about side effects she believes that she is having from her new medications. Please call to discuss and advise.    Phone: [phone]

## 2020-01-15 ENCOUNTER — OFFICE VISIT (OUTPATIENT)
Dept: OBSTETRICS AND GYNECOLOGY | Facility: CLINIC | Age: 30
End: 2020-01-15
Payer: COMMERCIAL

## 2020-01-15 VITALS
WEIGHT: 157 LBS | DIASTOLIC BLOOD PRESSURE: 88 MMHG | SYSTOLIC BLOOD PRESSURE: 128 MMHG | OXYGEN SATURATION: 99 % | HEART RATE: 85 BPM | HEIGHT: 61 IN | BODY MASS INDEX: 29.64 KG/M2

## 2020-01-15 DIAGNOSIS — N81.11 CYSTOCELE, MIDLINE: Primary | ICD-10-CM

## 2020-01-15 PROCEDURE — 99999 PR PBB SHADOW E&M-EST. PATIENT-LVL III: CPT | Mod: PBBFAC,,, | Performed by: OBSTETRICS & GYNECOLOGY

## 2020-01-15 PROCEDURE — 99999 PR PBB SHADOW E&M-EST. PATIENT-LVL III: ICD-10-PCS | Mod: PBBFAC,,, | Performed by: OBSTETRICS & GYNECOLOGY

## 2020-01-15 PROCEDURE — 99213 PR OFFICE/OUTPT VISIT, EST, LEVL III, 20-29 MIN: ICD-10-PCS | Mod: 24,S$GLB,, | Performed by: OBSTETRICS & GYNECOLOGY

## 2020-01-15 PROCEDURE — 99213 OFFICE O/P EST LOW 20 MIN: CPT | Mod: 24,S$GLB,, | Performed by: OBSTETRICS & GYNECOLOGY

## 2020-01-15 NOTE — NURSING
Fluarix given in Right Deltoid per order, no reaction noted. Pt instructed to wait 20 minutes after injection administration. Verbalized Understanding.  Tdap given in Left Deltoid per order, no reaction noted. Pt instructed to wait 20 minutes after injection administration. Verbalized Understanding.     Dr Gonzalez

## 2020-01-15 NOTE — PROGRESS NOTES
Subjective:    Patient ID: Keisha Mena is a 29 y.o. y.o. female    Chief Complaint:   Chief Complaint   Patient presents with    Mass     post partal 8 1/2 weeks       History of Present Illness:  Keisha presents today for evaluation of a feeling of something falling out of her vagina. She delivered an 8#10oz baby 8 weeks ago. She denies urinary incontinence but state she feels like she can't empty completely      Review of Systems   Constitutional: Negative for activity change, appetite change, chills, diaphoresis, fatigue, fever and unexpected weight change.   Respiratory: Negative for cough, shortness of breath and wheezing.    Cardiovascular: Negative for chest pain, palpitations and leg swelling.   Gastrointestinal: Negative for abdominal pain, blood in stool, constipation, diarrhea, nausea and vomiting.   Endocrine: Negative for diabetes, hair loss, hot flashes, hyperthyroidism and hypothyroidism.   Genitourinary: Negative for decreased libido, dyspareunia, dysuria, flank pain, frequency, genital sores, hematuria, menorrhagia, menstrual problem, pelvic pain, urgency, vaginal bleeding, vaginal discharge, vaginal pain, urinary incontinence, postcoital bleeding and vaginal odor.   Integumentary:  Negative for nipple discharge.   Hematological: Negative for adenopathy. Does not bruise/bleed easily.   Psychiatric/Behavioral: Negative for sleep disturbance. The patient is not nervous/anxious.    Breast: Negative for mastodynia and nipple discharge          Objective:    Vital Signs:  Vitals:    01/15/20 1547   BP: 128/88   Pulse: 85       Physical Exam:  General:  alert,normal appearing gravid female   Skin:  Skin color, texture, turgor normal. No rashes or lesions   Abdomen: soft, non-tender. Bowel sounds normal. No masses,  no organomegaly   Pelvis: External genitalia: normal general appearance  Urinary system: urethral meatus normal, bladder nontender  Vaginal: cystocele present, grade 2  Cervix: normal  appearance  Uterus: normal single, nontender  Adnexa: normal bimanual exam         Assessment:      1. Cystocele, midline          Plan:      Cystocele, midline      Kegel's. Avoid lifting. RTC 2 months

## 2020-01-15 NOTE — PATIENT INSTRUCTIONS
Understanding Cystocele (Prolapsed Bladder)  A cystocele is when a womans bladder sags down into the vagina. It does this when the wall of tissue between the bladder and the vagina gets weak. Its also called a prolapsed bladder. The sagging bladder can stretch the opening of the urethra. This is the tube that carries urine out of the body. This can cause urine to leak when you cough, sneeze, or lift something heavy. A cystocele can also cause discomfort in the pelvis and make it hard to fully empty your bladder.  Causes of a cystocele  A cystocele may be caused by:  · Heavy lifting  · Straining muscles during childbirth  · Repeated straining during bowel movements  · Weakened muscles around the vagina caused by lack of estrogen after menopause  Symptoms of a cystocele  Symptoms of a cystocele include:  · Leakage of urine when you cough, sneeze, or lift something heavy  · Heavy, achy, or full feeling in the pelvis  · Pelvic pressure that gets worse with standing, lifting, or coughing  · A bulge in the vagina that you can feel  · Lower back pain  · Sexual difficulties  · Problems with inserting tampons  Diagnosis of a cystocele  Your healthcare provider will ask about your medical history and give you a physical exam. You may also have a cystourethrogram. This is also called a voiding cystogram. This is an X-ray taken of the bladder while you urinate. Youll be given a special dye called a contrast medium. This helps show the bladder and urethra on the X-ray. This lets your healthcare provider can see the shape of your bladder, and look for problems. You may need other tests to see if there are any problems in the other areas of your urinary system.  A cystocele is graded during diagnosis. Grade 1 means the bladder sags only a short way into the top of the vagina. Grade 2 means the bladder sags down to the lower opening of the vagina. Grade 3 means the bladder sags out of the lower opening of the  vagina.  Treatment of a cystocele  Treatment depends on the grade of your cystocele and other factors. Your choices may include:  · Change of activity. You may need to avoid certain activities, such as heavy lifting or straining, that can cause your cystocele to get worse.  · Pessary. This is a device put in the vagina to hold the bladder in place.  · Surgery. A procedure can be done to move the bladder back into a more normal position and hold it in place.  · Estrogen replacement therapy. This may help to strengthen the muscles around the vagina and bladder. Talk with your healthcare provider about the risks and benefits of hormone therapy based on your medical history.  Date Last Reviewed: 8/29/2015  © 4017-3589 [employer]. [address] [place] [address], [place] [address]. All rights reserved. This information is not intended as a substitute for professional medical care. Always follow your healthcare professional's instructions.

## 2020-01-20 ENCOUNTER — OFFICE VISIT (OUTPATIENT)
Dept: INTERNAL MEDICINE | Facility: CLINIC | Age: 30
End: 2020-01-20
Payer: COMMERCIAL

## 2020-01-20 VITALS
WEIGHT: 156.06 LBS | DIASTOLIC BLOOD PRESSURE: 83 MMHG | BODY MASS INDEX: 29.47 KG/M2 | SYSTOLIC BLOOD PRESSURE: 118 MMHG | OXYGEN SATURATION: 99 % | RESPIRATION RATE: 20 BRPM | HEIGHT: 61 IN | HEART RATE: 86 BPM

## 2020-01-20 DIAGNOSIS — O13.9 GESTATIONAL HYPERTENSION, ANTEPARTUM: Primary | ICD-10-CM

## 2020-01-20 DIAGNOSIS — F41.9 ANXIETY: ICD-10-CM

## 2020-01-20 DIAGNOSIS — N81.10 BLADDER PROLAPSE, FEMALE, ACQUIRED: ICD-10-CM

## 2020-01-20 PROCEDURE — 99214 OFFICE O/P EST MOD 30 MIN: CPT | Mod: S$GLB,,, | Performed by: INTERNAL MEDICINE

## 2020-01-20 PROCEDURE — 99214 PR OFFICE/OUTPT VISIT, EST, LEVL IV, 30-39 MIN: ICD-10-PCS | Mod: S$GLB,,, | Performed by: INTERNAL MEDICINE

## 2020-01-20 PROCEDURE — 99999 PR PBB SHADOW E&M-EST. PATIENT-LVL III: CPT | Mod: PBBFAC,,, | Performed by: INTERNAL MEDICINE

## 2020-01-20 PROCEDURE — 99999 PR PBB SHADOW E&M-EST. PATIENT-LVL III: ICD-10-PCS | Mod: PBBFAC,,, | Performed by: INTERNAL MEDICINE

## 2020-01-20 NOTE — PROGRESS NOTES
Subjective:       Patient ID: Keisha Mena is a 29 y.o. female.    Chief Complaint: Follow-up (2 wk)      HPI:  Patient is known to me and presents for follow up gestational HTN and anxiety.    HTN: at last visit increased nifedipine to 90mg. However, she started getting flushing and fatigue. She reduced her dose back to 60mg. Her blood pressure is well controlled today. No side effects at the 60mg dose.    Anxiety: has vistaril PRN. She is taking as needed with good relief of sx. Does make her very sleepy so does not take often. She feels her sx are getting better; getting great support from friend and family now. She feels she is adjusting a bit better and does not feel she needs any more medication.     She saw Dr. Joshi and diagnosed with prolapsed bladder. Doing Kegel exercises.   She is also having hard stools. Started taking colace and fiber and helping.       Past Medical History:   Diagnosis Date    Female bladder prolapse        Family History   Problem Relation Age of Onset    Hypertension Mother     Alcohol abuse Father         history of s/p liver transplant    Breast cancer Neg Hx     Colon cancer Neg Hx     Ovarian cancer Neg Hx        Social History     Socioeconomic History    Marital status:      Spouse name: Not on file    Number of children: Not on file    Years of education: Not on file    Highest education level: Not on file   Occupational History    Not on file   Social Needs    Financial resource strain: Not on file    Food insecurity:     Worry: Not on file     Inability: Not on file    Transportation needs:     Medical: Not on file     Non-medical: Not on file   Tobacco Use    Smoking status: Never Smoker    Smokeless tobacco: Never Used   Substance and Sexual Activity    Alcohol use: No     Frequency: Never    Drug use: No    Sexual activity: Yes     Partners: Male     Comment:    Lifestyle    Physical activity:     Days per week: Not on file     Minutes  per session: Not on file    Stress: Not on file   Relationships    Social connections:     Talks on phone: Not on file     Gets together: Not on file     Attends Restoration service: Not on file     Active member of club or organization: Not on file     Attends meetings of clubs or organizations: Not on file     Relationship status: Not on file   Other Topics Concern    Not on file   Social History Narrative    Not on file       Review of Systems   Constitutional: Positive for fatigue. Negative for activity change, fever and unexpected weight change.   HENT: Negative for congestion, ear pain, hearing loss, rhinorrhea and sore throat.    Eyes: Negative for pain, redness and visual disturbance.   Respiratory: Negative for cough, shortness of breath and wheezing.    Cardiovascular: Negative for chest pain, palpitations and leg swelling.   Gastrointestinal: Negative for abdominal pain, constipation, diarrhea, nausea and vomiting.   Genitourinary: Negative for dysuria, frequency and urgency.   Musculoskeletal: Negative for back pain, joint swelling and neck pain.   Skin: Negative for color change, rash and wound.   Neurological: Negative for dizziness, tremors, weakness, light-headedness and headaches.         Objective:      Physical Exam   Constitutional: She is oriented to person, place, and time. She appears well-developed and well-nourished. No distress.   HENT:   Head: Normocephalic and atraumatic.   Right Ear: External ear normal.   Left Ear: External ear normal.   Eyes: Pupils are equal, round, and reactive to light. Conjunctivae and EOM are normal. Right eye exhibits no discharge. Left eye exhibits no discharge.   Neck: Neck supple. No tracheal deviation present.   Cardiovascular: Normal rate and regular rhythm.   No murmur heard.  Pulmonary/Chest: Effort normal and breath sounds normal. No respiratory distress. She has no wheezes. She has no rales.   Abdominal: Soft. Bowel sounds are normal. She exhibits no  distension. There is no tenderness.   Neurological: She is alert and oriented to person, place, and time. No cranial nerve deficit.   Skin: Skin is warm and dry.   Psychiatric: She has a normal mood and affect. Her behavior is normal.   Vitals reviewed.      Assessment:       1. Gestational hypertension, antepartum    2. Anxiety    3. Bladder prolapse, female, acquired        Plan:       Keisha was seen today for follow-up.    Diagnoses and all orders for this visit:    Gestational hypertension, antepartum  Controlled  Cont nifedipine 60mg daily  I suspect over new few weeks we will continue to wean down  Continue medications at same dose for now  Low Na diet  Exercise, weight loss  Check BP and keep log for next visit  Call if BP decreasing so we can decrease medications.  I will be on maternity leave and discussed with patient. I have her scheduled for a 3month follow up for BP check but if BP is low, weak, lightheaded, etc please come in a see one of my colleagues. Voiced understanding    Anxiety  Chronic stable  She is improved since last visit. Opening up to family and friends and getting more support  Going back to work soon as well  She is happy with PRN vistaril, continue for now  Call if any worsening of sx    Bladder prolapse, female, acquired  New  Saw GYN  Cont Kegel    RTC 3 months and PRN

## 2020-03-03 ENCOUNTER — TELEPHONE (OUTPATIENT)
Dept: INTERNAL MEDICINE | Facility: CLINIC | Age: 30
End: 2020-03-03

## 2020-03-03 NOTE — TELEPHONE ENCOUNTER
This Rx was filled per Dr. Fung on 1/6/20 (#30 with 11 refills). I phoned [facility] and authorized a 90 day supply per patient request.

## 2020-03-03 NOTE — TELEPHONE ENCOUNTER
----- Message from Irma Dukes sent at 3/3/2020 11:02 AM CST -----  Contact: Self  Keisha Mena  MRN: [id number]  : 1990  PCP: Lisa Fung  Home Phone      [phone]  Work Phone      Not on file.  Mobile          [phone]      MESSAGE:   Pt requesting refill or new Rx.   Is this a refill or new RX:  refill  RX name and strength: NIFEdipine (ADALAT CC) 60 MG TbSR  Last office visit:   Is this a 30-day or 90-day RX:  90-Day  [facility]  Comments:      Phone: [phone]

## 2020-03-04 ENCOUNTER — PATIENT MESSAGE (OUTPATIENT)
Dept: INTERNAL MEDICINE | Facility: CLINIC | Age: 30
End: 2020-03-04

## 2020-03-04 DIAGNOSIS — O13.9 GESTATIONAL HYPERTENSION, ANTEPARTUM: ICD-10-CM

## 2020-03-04 RX ORDER — NIFEDIPINE 60 MG/1
60 TABLET, EXTENDED RELEASE ORAL DAILY
Qty: 90 TABLET | Refills: 0 | Status: SHIPPED | OUTPATIENT
Start: 2020-03-04 | End: 2020-03-26 | Stop reason: SDUPTHER

## 2020-03-04 NOTE — TELEPHONE ENCOUNTER
Can you fill for Dr. Fung patient. Patients insurance wont cover unless its a 90 day supply. And patient wants correct mg sent in. Patient only taking 60mg daily. Fixed and pended for you.

## 2020-03-25 ENCOUNTER — TELEPHONE (OUTPATIENT)
Dept: INTERNAL MEDICINE | Facility: CLINIC | Age: 30
End: 2020-03-25

## 2020-03-25 NOTE — TELEPHONE ENCOUNTER
Instructed patient that she does not meet the criteria for testing here at our clinic. Offered her an appointment to come in and be checked out. Patient states she will return call when she speaks with her .

## 2020-03-25 NOTE — TELEPHONE ENCOUNTER
----- Message from Lyndsay Nicole sent at 3/25/2020 11:57 AM CDT -----  Contact: self  Keisha Mena  MRN: [id number]  : 1990  PCP: iLsa Fung  Home Phone      [phone]  Work Phone      Not on file.  Mobile          [phone]      MESSAGE:   Pt states that her  has been working in [place] on a hospital floor that had several COVID-19 pts. She states she has been having a sore throat, and chest pain since last week. She would like to know if she can get tested for COVID-19 so she can eventually return to work. Please return call @ [phone]. Thanks.

## 2020-03-26 ENCOUNTER — OFFICE VISIT (OUTPATIENT)
Dept: INTERNAL MEDICINE | Facility: CLINIC | Age: 30
End: 2020-03-26
Payer: COMMERCIAL

## 2020-03-26 VITALS
RESPIRATION RATE: 16 BRPM | DIASTOLIC BLOOD PRESSURE: 80 MMHG | TEMPERATURE: 98 F | HEART RATE: 84 BPM | SYSTOLIC BLOOD PRESSURE: 120 MMHG | BODY MASS INDEX: 29.13 KG/M2 | WEIGHT: 154.31 LBS | HEIGHT: 61 IN | OXYGEN SATURATION: 99 %

## 2020-03-26 DIAGNOSIS — J02.9 SORE THROAT: Primary | ICD-10-CM

## 2020-03-26 DIAGNOSIS — R61 DIAPHORESIS: ICD-10-CM

## 2020-03-26 DIAGNOSIS — R05.9 COUGH: ICD-10-CM

## 2020-03-26 LAB
CTP QC/QA: YES
MOLECULAR STREP A: NEGATIVE

## 2020-03-26 PROCEDURE — 87651 POCT STREP A MOLECULAR: ICD-10-PCS | Mod: QW,S$GLB,, | Performed by: INTERNAL MEDICINE

## 2020-03-26 PROCEDURE — 87651 STREP A DNA AMP PROBE: CPT | Mod: QW,S$GLB,, | Performed by: INTERNAL MEDICINE

## 2020-03-26 PROCEDURE — U0002 COVID-19 LAB TEST NON-CDC: HCPCS

## 2020-03-26 PROCEDURE — 99999 PR PBB SHADOW E&M-EST. PATIENT-LVL III: ICD-10-PCS | Mod: PBBFAC,,, | Performed by: INTERNAL MEDICINE

## 2020-03-26 PROCEDURE — 99213 PR OFFICE/OUTPT VISIT, EST, LEVL III, 20-29 MIN: ICD-10-PCS | Mod: S$GLB,,, | Performed by: INTERNAL MEDICINE

## 2020-03-26 PROCEDURE — 99999 PR PBB SHADOW E&M-EST. PATIENT-LVL III: CPT | Mod: PBBFAC,,, | Performed by: INTERNAL MEDICINE

## 2020-03-26 PROCEDURE — 99213 OFFICE O/P EST LOW 20 MIN: CPT | Mod: S$GLB,,, | Performed by: INTERNAL MEDICINE

## 2020-03-26 RX ORDER — AZITHROMYCIN 250 MG/1
TABLET, FILM COATED ORAL
COMMUNITY
Start: 2020-03-21 | End: 2020-05-20

## 2020-03-26 RX ORDER — NIFEDIPINE 30 MG/1
TABLET, EXTENDED RELEASE ORAL
COMMUNITY
Start: 2020-01-22 | End: 2021-01-11 | Stop reason: ALTCHOICE

## 2020-03-26 NOTE — PROGRESS NOTES
Subjective:       Patient ID: Keisha Mena is a 29 y.o. female.    Chief Complaint: Sore Throat and Generalized Body Aches    She finished her zithromax   Non productive cough ; dry cough.    Strep test is negative.  Works as a PT assistant ;comes across a lot of patients ;  Works at hospital :[employer].          Sore Throat    This is a new problem. The current episode started in the past 7 days. The problem has been gradually improving. There has been no fever. Associated symptoms include coughing. Pertinent negatives include no abdominal pain, congestion, diarrhea, ear pain, headaches, hoarse voice, neck pain, shortness of breath, stridor, swollen glands, trouble swallowing or vomiting. Associated symptoms comments: headaches. Treatments tried: zithromaz.   Cough   Associated symptoms include a sore throat. Pertinent negatives include no chest pain, ear pain, eye redness, fever, headaches, rash, rhinorrhea, shortness of breath or wheezing.     Review of Systems   Constitutional: Positive for fatigue. Negative for activity change, fever and unexpected weight change.   HENT: Positive for sore throat. Negative for congestion, ear pain, hearing loss, hoarse voice, rhinorrhea and trouble swallowing.    Eyes: Negative for pain, redness and visual disturbance.   Respiratory: Positive for cough. Negative for shortness of breath, wheezing and stridor.    Cardiovascular: Negative for chest pain, palpitations and leg swelling.   Gastrointestinal: Negative for abdominal pain, constipation, diarrhea, nausea and vomiting.   Genitourinary: Negative for dysuria, frequency and urgency.   Musculoskeletal: Negative for back pain, joint swelling and neck pain.   Skin: Negative for color change, rash and wound.   Neurological: Negative for dizziness, tremors, weakness, light-headedness and headaches.       Objective:      Physical Exam   Constitutional: She is oriented to person, place, and time. She appears well-developed and  well-nourished. No distress.   HENT:   Head: Normocephalic and atraumatic.   Right Ear: External ear normal. A middle ear effusion is present.   Left Ear: External ear normal. A middle ear effusion is present.   Nose: Mucosal edema, rhinorrhea and sinus tenderness present.   Eyes: Pupils are equal, round, and reactive to light. Conjunctivae and EOM are normal. Right eye exhibits no discharge. Left eye exhibits no discharge.   Neck: Normal range of motion. Neck supple. No JVD present. No tracheal deviation present. No thyromegaly present.   Cardiovascular: Normal rate, regular rhythm, normal heart sounds and intact distal pulses.   No murmur heard.  Pulmonary/Chest: Effort normal and breath sounds normal. No respiratory distress. She has no wheezes. She has no rales. She exhibits no tenderness.   Abdominal: Soft. Bowel sounds are normal. She exhibits no distension and no mass. There is no tenderness. There is no rebound and no guarding.   Musculoskeletal: Normal range of motion. She exhibits no edema.   Lymphadenopathy:     She has no cervical adenopathy.   Neurological: She is alert and oriented to person, place, and time. She has normal reflexes. No cranial nerve deficit. She exhibits normal muscle tone. Coordination normal.   Skin: Skin is warm and dry.   Psychiatric: She has a normal mood and affect. Her behavior is normal.   Nursing note and vitals reviewed.      Assessment:       1. Sore throat    2. Cough    3. Diaphoresis        Plan:   Keisha was seen today for sore throat and generalized body aches.    Diagnoses and all orders for this visit:    Sore throat  -     POCT Strep A, Molecular  -     SARS- CoV-2 (COVID-19) QUALITATIVE PCR    Cough  -     SARS- CoV-2 (COVID-19) QUALITATIVE PCR    Diaphoresis  -     SARS- CoV-2 (COVID-19) QUALITATIVE PCR    she is a health care worker   Cant go back to work till covid is back  Excuse till Monday  Instructions for Patients Awaiting COVID-19 Test Results    Test  results should be available within 7 days.    You can contact your care team via the patient portal or the regular phone number to check on testing status.    Your results will be made available to your provider and to our Infection Control Team. As soon as results are available, we will contact you. We will not be releasing results to the portal until your provider reviews them.     Please continue infection control precautions like covering your mouth when coughing, washing hands frequently and minimizing contact with others whenever possible. Current CDC recommendations do not require quarantine if you are feeling OK and haven't had fever in 24 hours. However, we recommend that you stay home while you are awaiting test results, if possible. Consider wearing a mask if you need to go out in public, until result is known.     Problem List Items Addressed This Visit     None      Visit Diagnoses     Sore throat    -  Primary    Relevant Orders    POCT Strep A, Molecular (Completed)

## 2020-03-28 LAB — SARS-COV-2 RNA RESP QL NAA+PROBE: NOT DETECTED

## 2020-03-30 ENCOUNTER — TELEPHONE (OUTPATIENT)
Dept: INTERNAL MEDICINE | Facility: CLINIC | Age: 30
End: 2020-03-30

## 2020-03-30 NOTE — TELEPHONE ENCOUNTER
----- Message from Vicky Mendosa sent at 3/30/2020  1:52 PM CDT -----  Contact: self   Keisha Mena  MRN: [id number]  : 1990  PCP: Lisa Fung  Home Phone      [phone]  Work Phone      Not on file.  Mobile          [phone]    MESSAGE:   Patient request to speak to a nurse regarding test results / work excuse.     Phone # [phone]    [id number] [address]

## 2020-03-30 NOTE — TELEPHONE ENCOUNTER
Patient wants to know if you want her to stay home for a few more days or return to work. Patient will also need a note for work.     Please advise.

## 2020-04-28 ENCOUNTER — TELEPHONE (OUTPATIENT)
Dept: OBSTETRICS AND GYNECOLOGY | Facility: CLINIC | Age: 30
End: 2020-04-28

## 2020-04-28 NOTE — TELEPHONE ENCOUNTER
----- Message from Kina Galeana LPN sent at 4/27/2020  4:56 PM CDT -----  Contact: Self      ----- Message -----  From: Kaylyn Lorenzo  Sent: 4/27/2020   4:23 PM CDT  To: Wyatt [facility]cal[facility] Staff    Keisha Mena  MRN: [id number]  Home Phone      [phone]  Work Phone      Not on file.  Mobile          [phone]    Patient Care Team:  Lisa Fung MD as PCP - General (Internal Medicine)  Marilu Hauser MD as Obstetrician (Obstetrics)  Susan Castro LPN as Care Coordinator  OB? No  What phone number can you be reached at? [phone]  Message:   Pt states she is trying to wean from breast pump and is having breast pain. Unable to work today due to pain. Please advise. Does not think it is mastitis.

## 2020-04-28 NOTE — LETTER
April 28, 2020      [facility]  [address] [place] [id number]  Phone: [phone]       Patient: Keisha Mena   YOB: 1990  Date of Visit: 04/28/2020    To Whom It May Concern:    Otf Mena  was at [facility] on 4/27/20. She may return to work/school on 4/30/20 with no restrictions. If you have any questions or concerns, or if I can be of further assistance, please do not hesitate to contact me.    Sincerely,    Eloise Zepeda LPN

## 2020-04-28 NOTE — TELEPHONE ENCOUNTER
Patient called with painful engorgement of breast she is trying to stop pumping. Instructed to use chilled towels vegetabels ice packs for comfort and to take ibuprofen for pain and wear a good support bra with a sports bra on top and take antihistamines to aid with drying up the milk. Patient verbalized understanding . Requesting a note for a yesterday and tomorrow  Due to the pain with trying to stop pumping.v/o from Dr. Junior for return to work excuse done.

## 2020-04-30 NOTE — TELEPHONE ENCOUNTER
Pt called states is trying to wean from breastfeeding. Reports left breast with hard, red area on Tuesday with fever. Now right breast warm, red, and tender to touch. Fever has stopped now.

## 2020-04-30 NOTE — TELEPHONE ENCOUNTER
----- Message from Kaylyn Ventura sent at 4/30/2020 12:58 PM CDT -----  Contact: Self  Keisha Mena  MRN: [id number]  Home Phone      [phone]  Work Phone      Not on file.  Mobile          [phone]    Patient Care Team:  Lisa Fung MD as PCP - General (Internal Medicine)  Marilu Hauser MD as Obstetrician (Obstetrics)  Susan Castro LPN as Care Coordinator  OB? No  What phone number can you be reached at? [phone]  Message:   Pt still experiencing breast pain from weaning. Please advise. Would like to know if this is normal or she needs to be seen.

## 2020-05-01 RX ORDER — DICLOXACILLIN SODIUM 500 MG/1
500 CAPSULE ORAL EVERY 6 HOURS
Qty: 40 CAPSULE | Refills: 0 | Status: SHIPPED | OUTPATIENT
Start: 2020-05-01 | End: 2020-05-11

## 2020-05-20 ENCOUNTER — PATIENT OUTREACH (OUTPATIENT)
Dept: ADMINISTRATIVE | Facility: HOSPITAL | Age: 30
End: 2020-05-20

## 2020-05-20 ENCOUNTER — OFFICE VISIT (OUTPATIENT)
Dept: INTERNAL MEDICINE | Facility: CLINIC | Age: 30
End: 2020-05-20
Payer: COMMERCIAL

## 2020-05-20 VITALS
HEIGHT: 61 IN | DIASTOLIC BLOOD PRESSURE: 77 MMHG | TEMPERATURE: 98 F | HEART RATE: 75 BPM | RESPIRATION RATE: 16 BRPM | WEIGHT: 155.19 LBS | OXYGEN SATURATION: 98 % | BODY MASS INDEX: 29.3 KG/M2 | SYSTOLIC BLOOD PRESSURE: 128 MMHG

## 2020-05-20 DIAGNOSIS — O13.9 GESTATIONAL HYPERTENSION, ANTEPARTUM: Primary | ICD-10-CM

## 2020-05-20 DIAGNOSIS — F41.9 ANXIETY: ICD-10-CM

## 2020-05-20 PROCEDURE — 99214 OFFICE O/P EST MOD 30 MIN: CPT | Mod: S$GLB,,, | Performed by: INTERNAL MEDICINE

## 2020-05-20 PROCEDURE — 99999 PR PBB SHADOW E&M-EST. PATIENT-LVL IV: ICD-10-PCS | Mod: PBBFAC,,, | Performed by: INTERNAL MEDICINE

## 2020-05-20 PROCEDURE — 99214 PR OFFICE/OUTPT VISIT, EST, LEVL IV, 30-39 MIN: ICD-10-PCS | Mod: S$GLB,,, | Performed by: INTERNAL MEDICINE

## 2020-05-20 PROCEDURE — 99999 PR PBB SHADOW E&M-EST. PATIENT-LVL IV: CPT | Mod: PBBFAC,,, | Performed by: INTERNAL MEDICINE

## 2020-05-20 NOTE — PROGRESS NOTES
Subjective:       Patient ID: Keisha Mena is a 29 y.o. female.    Chief Complaint: Follow-up (4 mo)      HPI:  Patient is known to me and presents for follow up gestational HTN and anxiety.     HTN: on nifedipine to 60mg--self weaned to 30mg and is now more forgetful and takes it 2-3x/week right now. Recently stopped pumping. Baby is 6 months ago. She denies headaches, chest pains, SOB, LE edema. Home BP ranges 120-130s/80-low 90s.       Anxiety: has vistaril PRN. She is taking as needed with good relief of sx. Does make her very sleepy so does not take often. She feels her sx are getting better; getting great support from friend and family now. She feels she is adjusting a bit better and does not feel she needs any medication adjustments.      She saw Dr. Joshi and diagnosed with prolapsed bladder. Doing Kegel exercises. Still urinary incontinence.     Past Medical History:   Diagnosis Date    Female bladder prolapse        Family History   Problem Relation Age of Onset    Hypertension Mother     Alcohol abuse Father         history of s/p liver transplant    Breast cancer Neg Hx     Colon cancer Neg Hx     Ovarian cancer Neg Hx        Social History     Socioeconomic History    Marital status:      Spouse name: Not on file    Number of children: Not on file    Years of education: Not on file    Highest education level: Not on file   Occupational History    Not on file   Social Needs    Financial resource strain: Not hard at all    Food insecurity:     Worry: Never true     Inability: Never true    Transportation needs:     Medical: No     Non-medical: No   Tobacco Use    Smoking status: Never Smoker    Smokeless tobacco: Never Used   Substance and Sexual Activity    Alcohol use: No     Frequency: Monthly or less     Drinks per session: 1 or 2     Binge frequency: Never    Drug use: No    Sexual activity: Yes     Partners: Male     Comment:    Lifestyle    Physical activity:      Days per week: 1 day     Minutes per session: 10 min    Stress: To some extent   Relationships    Social connections:     Talks on phone: More than three times a week     Gets together: Once a week     Attends Sabianism service: Not on file     Active member of club or organization: No     Attends meetings of clubs or organizations: Never     Relationship status:    Other Topics Concern    Not on file   Social History Narrative    Not on file       Review of Systems   Constitutional: Negative for activity change, fatigue, fever and unexpected weight change.   HENT: Negative for congestion, ear pain, hearing loss, rhinorrhea, sore throat and tinnitus.    Eyes: Negative for pain, redness and visual disturbance.   Respiratory: Negative for cough, shortness of breath and wheezing.    Cardiovascular: Negative for chest pain, palpitations and leg swelling.   Gastrointestinal: Negative for abdominal pain, blood in stool, constipation, diarrhea, nausea and vomiting.   Genitourinary: Negative for dysuria, frequency, pelvic pain and urgency.   Musculoskeletal: Negative for back pain, joint swelling and neck pain.   Skin: Negative for color change, rash and wound.   Neurological: Negative for dizziness, tremors, weakness, light-headedness and headaches.         Objective:      Physical Exam   Constitutional: She is oriented to person, place, and time. She appears well-developed and well-nourished. No distress.   HENT:   Head: Normocephalic and atraumatic.   Right Ear: External ear normal.   Left Ear: External ear normal.   Eyes: EOM are normal. Right eye exhibits no discharge. Left eye exhibits no discharge. No scleral icterus.   Neck: Neck supple. No thyromegaly present.   Cardiovascular: Normal rate and regular rhythm. Exam reveals no gallop and no friction rub.   No murmur heard.  Pulmonary/Chest: Effort normal and breath sounds normal. No respiratory distress. She has no wheezes. She has no rales.   Abdominal:  Soft. Bowel sounds are normal. She exhibits no distension. There is no tenderness.   Lymphadenopathy:     She has no cervical adenopathy.   Neurological: She is alert and oriented to person, place, and time. No cranial nerve deficit.   Skin: Skin is warm and dry.   Psychiatric: She has a normal mood and affect. Her behavior is normal.   Vitals reviewed.      Assessment:       1. Gestational hypertension, antepartum    2. Anxiety        Plan:       Keisha was seen today for follow-up.    Diagnoses and all orders for this visit:    Gestational hypertension, antepartum  --will stop nifedipine  Monitor home BP  6 months post partum so if BP remains elevated off meds will call essential HTN but will resume nifedipine 30mg daily as desires pregnancy in the future  Low Na diet  Exercise, weight loss  Check BP and keep log for next visit    Anxiety  Chronic controlled  Likely post partum  Has PRN vistrail, uses sparingly  Doing great overall    RTC 2 weeks BP check and PNR

## 2020-06-03 ENCOUNTER — OFFICE VISIT (OUTPATIENT)
Dept: INTERNAL MEDICINE | Facility: CLINIC | Age: 30
End: 2020-06-03
Payer: COMMERCIAL

## 2020-06-03 VITALS
RESPIRATION RATE: 16 BRPM | OXYGEN SATURATION: 98 % | SYSTOLIC BLOOD PRESSURE: 110 MMHG | TEMPERATURE: 98 F | BODY MASS INDEX: 29.76 KG/M2 | HEIGHT: 61 IN | WEIGHT: 157.63 LBS | DIASTOLIC BLOOD PRESSURE: 80 MMHG | HEART RATE: 90 BPM

## 2020-06-03 DIAGNOSIS — F41.9 ANXIETY: ICD-10-CM

## 2020-06-03 DIAGNOSIS — O13.9 GESTATIONAL HYPERTENSION, ANTEPARTUM: Primary | ICD-10-CM

## 2020-06-03 PROBLEM — Z34.90 SUPERVISION OF NORMAL PREGNANCY: Status: RESOLVED | Noted: 2019-05-08 | Resolved: 2020-06-03

## 2020-06-03 PROBLEM — O16.3 ELEVATED BLOOD PRESSURE AFFECTING PREGNANCY IN THIRD TRIMESTER, ANTEPARTUM: Status: RESOLVED | Noted: 2019-11-13 | Resolved: 2020-06-03

## 2020-06-03 PROCEDURE — 99213 PR OFFICE/OUTPT VISIT, EST, LEVL III, 20-29 MIN: ICD-10-PCS | Mod: S$GLB,,, | Performed by: INTERNAL MEDICINE

## 2020-06-03 PROCEDURE — 99999 PR PBB SHADOW E&M-EST. PATIENT-LVL IV: ICD-10-PCS | Mod: PBBFAC,,, | Performed by: INTERNAL MEDICINE

## 2020-06-03 PROCEDURE — 99213 OFFICE O/P EST LOW 20 MIN: CPT | Mod: S$GLB,,, | Performed by: INTERNAL MEDICINE

## 2020-06-03 PROCEDURE — 99999 PR PBB SHADOW E&M-EST. PATIENT-LVL IV: CPT | Mod: PBBFAC,,, | Performed by: INTERNAL MEDICINE

## 2020-06-03 NOTE — PROGRESS NOTES
Subjective:       Patient ID: Keisha Mena is a 29 y.o. female.    Chief Complaint: Follow-up      HPI:  Patient is known to me and presents for follow up HTN. At last visit we decided to stop nifedipine completely. Home -120s/80-90. She denies chest pains, SOB, LE edema. She can sometimes feel anxious and BP rises but if she relaxes comes back to normal. She knows she needs to focus more on diet, exercise and weight loss. BP in clinic today is great.    She continues to use hydroxyzine PRN for anxiety and overall working well.     Past Medical History:   Diagnosis Date    Female bladder prolapse        Family History   Problem Relation Age of Onset    Hypertension Mother     Alcohol abuse Father         history of s/p liver transplant    Breast cancer Neg Hx     Colon cancer Neg Hx     Ovarian cancer Neg Hx        Social History     Socioeconomic History    Marital status:      Spouse name: Not on file    Number of children: Not on file    Years of education: Not on file    Highest education level: Not on file   Occupational History    Not on file   Social Needs    Financial resource strain: Not hard at all    Food insecurity:     Worry: Never true     Inability: Never true    Transportation needs:     Medical: No     Non-medical: No   Tobacco Use    Smoking status: Never Smoker    Smokeless tobacco: Never Used   Substance and Sexual Activity    Alcohol use: No     Frequency: Monthly or less     Drinks per session: 1 or 2     Binge frequency: Never    Drug use: No    Sexual activity: Yes     Partners: Male     Comment:    Lifestyle    Physical activity:     Days per week: 1 day     Minutes per session: 10 min    Stress: To some extent   Relationships    Social connections:     Talks on phone: More than three times a week     Gets together: Once a week     Attends Denominational service: Not on file     Active member of club or organization: No     Attends meetings of clubs or  organizations: Never     Relationship status:    Other Topics Concern    Not on file   Social History Narrative    Not on file       Review of Systems   Constitutional: Negative for activity change, fatigue, fever and unexpected weight change.   HENT: Negative for congestion, ear pain, hearing loss, rhinorrhea and sore throat.    Eyes: Negative for pain, redness and visual disturbance.   Respiratory: Negative for cough, shortness of breath and wheezing.    Cardiovascular: Negative for chest pain, palpitations and leg swelling.   Gastrointestinal: Negative for abdominal pain, constipation, diarrhea, nausea and vomiting.   Genitourinary: Negative for dysuria, frequency and urgency.   Musculoskeletal: Negative for back pain, joint swelling and neck pain.   Skin: Negative for color change, rash and wound.   Neurological: Negative for dizziness, tremors, weakness, light-headedness and headaches.         Objective:      Physical Exam   Constitutional: She is oriented to person, place, and time. She appears well-developed and well-nourished. No distress.   HENT:   Head: Normocephalic and atraumatic.   Right Ear: External ear normal.   Left Ear: External ear normal.   Eyes: Pupils are equal, round, and reactive to light. Conjunctivae and EOM are normal. Right eye exhibits no discharge. Left eye exhibits no discharge.   Neck: Neck supple. No tracheal deviation present.   Cardiovascular: Normal rate and regular rhythm.   No murmur heard.  Pulmonary/Chest: Effort normal and breath sounds normal. No respiratory distress. She has no wheezes. She has no rales.   Abdominal: Soft. Bowel sounds are normal. She exhibits no distension. There is no tenderness.   Neurological: She is alert and oriented to person, place, and time. No cranial nerve deficit.   Skin: Skin is warm and dry.   Psychiatric: She has a normal mood and affect. Her behavior is normal.   Vitals reviewed.      Assessment:       1. Gestational hypertension,  antepartum    2. Anxiety        Plan:       Keisha was seen today for follow-up.    Diagnoses and all orders for this visit:    Gestational hypertension, antepartum  Resolved  Will remain off procardia for now  DBP right at 90 so borderline but will work on diet, exercise and weight loss for next few months  Low Na diet  Exercise, weight loss  Check BP and keep log for next visit    Anxiety  Chronic, stable  Cont hydroxyzine PRN    RTC 6 months for BP check and PRN

## 2020-08-07 ENCOUNTER — OFFICE VISIT (OUTPATIENT)
Dept: OBSTETRICS AND GYNECOLOGY | Facility: CLINIC | Age: 30
End: 2020-08-07
Payer: COMMERCIAL

## 2020-08-07 VITALS
SYSTOLIC BLOOD PRESSURE: 116 MMHG | BODY MASS INDEX: 29.71 KG/M2 | RESPIRATION RATE: 14 BRPM | HEART RATE: 70 BPM | HEIGHT: 61 IN | WEIGHT: 157.38 LBS | DIASTOLIC BLOOD PRESSURE: 82 MMHG

## 2020-08-07 DIAGNOSIS — N39.46 MIXED STRESS AND URGE URINARY INCONTINENCE: ICD-10-CM

## 2020-08-07 DIAGNOSIS — N81.11 MIDLINE CYSTOCELE: Primary | ICD-10-CM

## 2020-08-07 PROCEDURE — 99999 PR PBB SHADOW E&M-EST. PATIENT-LVL III: CPT | Mod: PBBFAC,,, | Performed by: OBSTETRICS & GYNECOLOGY

## 2020-08-07 PROCEDURE — 99213 PR OFFICE/OUTPT VISIT, EST, LEVL III, 20-29 MIN: ICD-10-PCS | Mod: 25,S$GLB,, | Performed by: OBSTETRICS & GYNECOLOGY

## 2020-08-07 PROCEDURE — 99213 OFFICE O/P EST LOW 20 MIN: CPT | Mod: 25,S$GLB,, | Performed by: OBSTETRICS & GYNECOLOGY

## 2020-08-07 PROCEDURE — 57160 INSERT PESSARY/OTHER DEVICE: CPT | Mod: S$GLB,,, | Performed by: OBSTETRICS & GYNECOLOGY

## 2020-08-07 PROCEDURE — 99999 PR PBB SHADOW E&M-EST. PATIENT-LVL III: ICD-10-PCS | Mod: PBBFAC,,, | Performed by: OBSTETRICS & GYNECOLOGY

## 2020-08-07 PROCEDURE — 57160 PR FIT/INSERT INTRAVAG SUPPORT DEVICE: ICD-10-PCS | Mod: S$GLB,,, | Performed by: OBSTETRICS & GYNECOLOGY

## 2020-08-07 NOTE — PROGRESS NOTES
Subjective:    Patient ID: Keisha Mena is a 29 y.o. y.o. female.     Chief Complaint:   Chief Complaint   Patient presents with    Follow-up     prolapse       History of Present Illness:  Keisha presents today complaining of prolapse. She was diagnosed with a midline cystocele. She reports difficulty emptying bladder as well as lower pelvic pressure and discomfort. She has to splint in the vagina to completely empty her bladder. She also has some mixed incontinence. She reports urgency and frequency. Reports pain with intercourse.       ROS:   Review of Systems   Constitutional: Negative for activity change, appetite change, chills, diaphoresis, fatigue, fever and unexpected weight change.   HENT: Negative for mouth sores and tinnitus.    Eyes: Negative for discharge and visual disturbance.   Respiratory: Negative for cough, shortness of breath and wheezing.    Cardiovascular: Negative for chest pain, palpitations and leg swelling.   Gastrointestinal: Negative for abdominal pain, bloating, blood in stool, constipation, diarrhea, nausea and vomiting.   Endocrine: Negative for diabetes, hair loss, hot flashes, hyperthyroidism and hypothyroidism.   Genitourinary: Positive for bladder incontinence, frequency and urgency. Negative for dysuria, flank pain, genital sores, hematuria, vaginal bleeding, vaginal discharge, vaginal pain, postcoital bleeding and vaginal odor.   Musculoskeletal: Negative for back pain, joint swelling and myalgias.   Integumentary:  Negative for rash, acne, breast mass, nipple discharge and breast skin changes.   Neurological: Negative for seizures, syncope, numbness and headaches.   Hematological: Negative for adenopathy. Does not bruise/bleed easily.   Psychiatric/Behavioral: Negative for depression and sleep disturbance. The patient is not nervous/anxious.    Breast: Negative for mass, mastodynia, nipple discharge and skin changes          Objective:    Vital Signs:  Vitals:    08/07/20 0807    BP: 116/82   Pulse: 70   Resp: 14       Physical Exam:  General:  alert, cooperative, no distress   Head Normocephalic, without obvious abnormality, atraumatic   Neck .supple, symmetrical, trachea midline   Skin:  Skin color, texture, turgor normal. No rashes or lesions   Abdomen:  soft, non-tender; bowel sounds normal   Pelvis: External genitalia: normal general appearance  Urinary system: urethral meatus normal, bladder nontender  Vaginal: normal mucosa without prolapse or lesions, cystocele present, stage 2; KENDRICK noted  Cervix: normal appearance  Uterus: normal size, shape, position  Adnexa: normal size, nontender bilaterally   Extremities extremities normal, atraumatic, no cyanosis or edema   Neurologic Grossly normal          Assessment:      1. Midline cystocele    2. Mixed stress and urge urinary incontinence          Plan:      PLAN:   1. Pessary placed #5 ring with support  2. Referral to PT

## 2020-08-26 ENCOUNTER — TELEPHONE (OUTPATIENT)
Dept: OBSTETRICS AND GYNECOLOGY | Facility: CLINIC | Age: 30
End: 2020-08-26

## 2020-08-26 NOTE — TELEPHONE ENCOUNTER
Physical therapy orders faxed to [facility] at [phone] with fax confirmation received. Original sent to be scanned into chart.

## 2020-12-18 ENCOUNTER — TELEPHONE (OUTPATIENT)
Dept: OBSTETRICS AND GYNECOLOGY | Facility: CLINIC | Age: 30
End: 2020-12-18

## 2020-12-18 NOTE — TELEPHONE ENCOUNTER
Patient PT progress report from [facility] received. Report labeled and placed for Dr. Junior' review.

## 2021-01-11 ENCOUNTER — LAB VISIT (OUTPATIENT)
Dept: LAB | Facility: HOSPITAL | Age: 31
End: 2021-01-11
Attending: OBSTETRICS & GYNECOLOGY
Payer: COMMERCIAL

## 2021-01-11 ENCOUNTER — TELEPHONE (OUTPATIENT)
Dept: OBSTETRICS AND GYNECOLOGY | Facility: CLINIC | Age: 31
End: 2021-01-11

## 2021-01-11 ENCOUNTER — OFFICE VISIT (OUTPATIENT)
Dept: OBSTETRICS AND GYNECOLOGY | Facility: CLINIC | Age: 31
End: 2021-01-11
Payer: COMMERCIAL

## 2021-01-11 VITALS — DIASTOLIC BLOOD PRESSURE: 86 MMHG | SYSTOLIC BLOOD PRESSURE: 138 MMHG | BODY MASS INDEX: 30.23 KG/M2 | WEIGHT: 160 LBS

## 2021-01-11 DIAGNOSIS — Z32.01 POSITIVE URINE PREGNANCY TEST: ICD-10-CM

## 2021-01-11 DIAGNOSIS — Z32.01 POSITIVE URINE PREGNANCY TEST: Primary | ICD-10-CM

## 2021-01-11 LAB — HCG INTACT+B SERPL-ACNC: 16 MIU/ML

## 2021-01-11 PROCEDURE — 36415 COLL VENOUS BLD VENIPUNCTURE: CPT

## 2021-01-11 PROCEDURE — 99999 PR PBB SHADOW E&M-EST. PATIENT-LVL III: CPT | Mod: PBBFAC,,, | Performed by: OBSTETRICS & GYNECOLOGY

## 2021-01-11 PROCEDURE — 99999 PR PBB SHADOW E&M-EST. PATIENT-LVL III: ICD-10-PCS | Mod: PBBFAC,,, | Performed by: OBSTETRICS & GYNECOLOGY

## 2021-01-11 PROCEDURE — 84702 CHORIONIC GONADOTROPIN TEST: CPT

## 2021-01-11 PROCEDURE — 99213 OFFICE O/P EST LOW 20 MIN: CPT | Mod: S$GLB,,, | Performed by: OBSTETRICS & GYNECOLOGY

## 2021-01-11 PROCEDURE — 99213 PR OFFICE/OUTPT VISIT, EST, LEVL III, 20-29 MIN: ICD-10-PCS | Mod: S$GLB,,, | Performed by: OBSTETRICS & GYNECOLOGY

## 2021-01-13 ENCOUNTER — TELEPHONE (OUTPATIENT)
Dept: OBSTETRICS AND GYNECOLOGY | Facility: CLINIC | Age: 31
End: 2021-01-13

## 2021-01-13 ENCOUNTER — LAB VISIT (OUTPATIENT)
Dept: LAB | Facility: HOSPITAL | Age: 31
End: 2021-01-13
Attending: OBSTETRICS & GYNECOLOGY
Payer: COMMERCIAL

## 2021-01-13 DIAGNOSIS — O20.9 BLEEDING IN EARLY PREGNANCY: Primary | ICD-10-CM

## 2021-01-13 DIAGNOSIS — Z32.01 POSITIVE URINE PREGNANCY TEST: ICD-10-CM

## 2021-01-13 LAB — HCG INTACT+B SERPL-ACNC: 26 MIU/ML

## 2021-01-13 PROCEDURE — 84702 CHORIONIC GONADOTROPIN TEST: CPT

## 2021-01-13 PROCEDURE — 36415 COLL VENOUS BLD VENIPUNCTURE: CPT

## 2021-01-14 ENCOUNTER — TELEPHONE (OUTPATIENT)
Dept: OBSTETRICS AND GYNECOLOGY | Facility: CLINIC | Age: 31
End: 2021-01-14

## 2021-01-15 ENCOUNTER — TELEPHONE (OUTPATIENT)
Dept: OBSTETRICS AND GYNECOLOGY | Facility: CLINIC | Age: 31
End: 2021-01-15

## 2021-01-15 ENCOUNTER — LAB VISIT (OUTPATIENT)
Dept: LAB | Facility: HOSPITAL | Age: 31
End: 2021-01-15
Attending: OBSTETRICS & GYNECOLOGY
Payer: COMMERCIAL

## 2021-01-15 DIAGNOSIS — O20.9 BLEEDING IN EARLY PREGNANCY: ICD-10-CM

## 2021-01-15 DIAGNOSIS — O20.9 BLEEDING IN EARLY PREGNANCY: Primary | ICD-10-CM

## 2021-01-15 LAB — HCG INTACT+B SERPL-ACNC: 88 MIU/ML

## 2021-01-15 PROCEDURE — 84702 CHORIONIC GONADOTROPIN TEST: CPT

## 2021-01-15 PROCEDURE — 36415 COLL VENOUS BLD VENIPUNCTURE: CPT

## 2021-01-22 ENCOUNTER — LAB VISIT (OUTPATIENT)
Dept: LAB | Facility: HOSPITAL | Age: 31
End: 2021-01-22
Attending: OBSTETRICS & GYNECOLOGY
Payer: COMMERCIAL

## 2021-01-22 ENCOUNTER — PATIENT MESSAGE (OUTPATIENT)
Dept: OBSTETRICS AND GYNECOLOGY | Facility: CLINIC | Age: 31
End: 2021-01-22

## 2021-01-22 ENCOUNTER — TELEPHONE (OUTPATIENT)
Dept: OBSTETRICS AND GYNECOLOGY | Facility: CLINIC | Age: 31
End: 2021-01-22

## 2021-01-22 DIAGNOSIS — Z36.89 CONFIRM FETAL CARDIAC ACTIVITY USING ULTRASOUND: Primary | ICD-10-CM

## 2021-01-22 DIAGNOSIS — O20.9 BLEEDING IN EARLY PREGNANCY: ICD-10-CM

## 2021-01-22 LAB — HCG INTACT+B SERPL-ACNC: 343 MIU/ML

## 2021-01-22 PROCEDURE — 36415 COLL VENOUS BLD VENIPUNCTURE: CPT

## 2021-01-22 PROCEDURE — 84702 CHORIONIC GONADOTROPIN TEST: CPT

## 2021-02-04 ENCOUNTER — PROCEDURE VISIT (OUTPATIENT)
Dept: OBSTETRICS AND GYNECOLOGY | Facility: CLINIC | Age: 31
End: 2021-02-04
Payer: COMMERCIAL

## 2021-02-04 DIAGNOSIS — Z36.89 CONFIRM FETAL CARDIAC ACTIVITY USING ULTRASOUND: ICD-10-CM

## 2021-02-04 DIAGNOSIS — O36.80X0 PREGNANCY OF UNKNOWN ANATOMIC LOCATION: ICD-10-CM

## 2021-02-04 PROCEDURE — 76817 TRANSVAGINAL US OBSTETRIC: CPT | Mod: S$GLB,,, | Performed by: OBSTETRICS & GYNECOLOGY

## 2021-02-04 PROCEDURE — 76817 PR US, OB, TRANSVAG APPROACH: ICD-10-PCS | Mod: S$GLB,,, | Performed by: OBSTETRICS & GYNECOLOGY

## 2021-02-05 ENCOUNTER — INFUSION (OUTPATIENT)
Dept: INFUSION THERAPY | Facility: HOSPITAL | Age: 31
End: 2021-02-05
Attending: OBSTETRICS & GYNECOLOGY
Payer: COMMERCIAL

## 2021-02-05 ENCOUNTER — PATIENT MESSAGE (OUTPATIENT)
Dept: OBSTETRICS AND GYNECOLOGY | Facility: CLINIC | Age: 31
End: 2021-02-05

## 2021-02-05 ENCOUNTER — TELEPHONE (OUTPATIENT)
Dept: OBSTETRICS AND GYNECOLOGY | Facility: CLINIC | Age: 31
End: 2021-02-05

## 2021-02-05 VITALS
HEIGHT: 61 IN | WEIGHT: 160 LBS | BODY MASS INDEX: 30.21 KG/M2 | SYSTOLIC BLOOD PRESSURE: 126 MMHG | RESPIRATION RATE: 18 BRPM | DIASTOLIC BLOOD PRESSURE: 77 MMHG | HEART RATE: 86 BPM

## 2021-02-05 DIAGNOSIS — F41.9 ANXIETY: ICD-10-CM

## 2021-02-05 DIAGNOSIS — O00.90 ECTOPIC PREGNANCY, UNSPECIFIED LOCATION, UNSPECIFIED WHETHER INTRAUTERINE PREGNANCY PRESENT: Primary | ICD-10-CM

## 2021-02-05 DIAGNOSIS — O00.102 LEFT TUBAL PREGNANCY WITHOUT INTRAUTERINE PREGNANCY: ICD-10-CM

## 2021-02-05 DIAGNOSIS — O00.102 LEFT TUBAL PREGNANCY WITHOUT INTRAUTERINE PREGNANCY: Primary | ICD-10-CM

## 2021-02-05 PROCEDURE — 96401 CHEMO ANTI-NEOPL SQ/IM: CPT

## 2021-02-05 PROCEDURE — 63600175 PHARM REV CODE 636 W HCPCS: Performed by: OBSTETRICS & GYNECOLOGY

## 2021-02-05 RX ORDER — METHOTREXATE 25 MG/ML
50 INJECTION INTRA-ARTERIAL; INTRAMUSCULAR; INTRATHECAL; INTRAVENOUS
Status: CANCELLED | OUTPATIENT
Start: 2021-02-05

## 2021-02-05 RX ORDER — METHOTREXATE 25 MG/ML
50 INJECTION INTRA-ARTERIAL; INTRAMUSCULAR; INTRATHECAL; INTRAVENOUS ONCE
Status: COMPLETED | OUTPATIENT
Start: 2021-02-05 | End: 2021-02-05

## 2021-02-05 RX ORDER — METHOTREXATE 25 MG/ML
50 INJECTION INTRA-ARTERIAL; INTRAMUSCULAR; INTRATHECAL; INTRAVENOUS
Status: DISCONTINUED | OUTPATIENT
Start: 2021-02-05 | End: 2021-02-05

## 2021-02-05 RX ORDER — METHOTREXATE 25 MG/ML
50 INJECTION INTRA-ARTERIAL; INTRAMUSCULAR; INTRATHECAL; INTRAVENOUS
Status: CANCELLED | OUTPATIENT
Start: 2021-02-12

## 2021-02-05 RX ADMIN — METHOTREXATE 87.5 MG: 25 INJECTION INTRA-ARTERIAL; INTRAMUSCULAR; INTRATHECAL; INTRAVENOUS at 10:02

## 2021-02-07 ENCOUNTER — ANESTHESIA (OUTPATIENT)
Dept: SURGERY | Facility: HOSPITAL | Age: 31
End: 2021-02-07
Payer: COMMERCIAL

## 2021-02-07 ENCOUNTER — NURSE TRIAGE (OUTPATIENT)
Dept: ADMINISTRATIVE | Facility: CLINIC | Age: 31
End: 2021-02-07

## 2021-02-07 ENCOUNTER — ANESTHESIA EVENT (OUTPATIENT)
Dept: SURGERY | Facility: HOSPITAL | Age: 31
End: 2021-02-07
Payer: COMMERCIAL

## 2021-02-07 ENCOUNTER — HOSPITAL ENCOUNTER (OUTPATIENT)
Facility: HOSPITAL | Age: 31
Discharge: HOME | End: 2021-02-08
Attending: EMERGENCY MEDICINE | Admitting: OBSTETRICS & GYNECOLOGY
Payer: COMMERCIAL

## 2021-02-07 DIAGNOSIS — R10.2 PELVIC PAIN: ICD-10-CM

## 2021-02-07 DIAGNOSIS — R00.0 TACHYCARDIA: ICD-10-CM

## 2021-02-07 DIAGNOSIS — K66.1 RUPTURED LEFT TUBAL ECTOPIC PREGNANCY CAUSING HEMOPERITONEUM: Primary | ICD-10-CM

## 2021-02-07 DIAGNOSIS — O00.102 RUPTURED LEFT TUBAL ECTOPIC PREGNANCY CAUSING HEMOPERITONEUM: Primary | ICD-10-CM

## 2021-02-07 LAB
ABO + RH BLD: NORMAL
ALBUMIN SERPL BCP-MCNC: 4.2 G/DL (ref 3.5–5.2)
ALP SERPL-CCNC: 58 U/L (ref 55–135)
ALT SERPL W/O P-5'-P-CCNC: 8 U/L (ref 10–44)
ANION GAP SERPL CALC-SCNC: 10 MMOL/L (ref 8–16)
AST SERPL-CCNC: 14 U/L (ref 10–40)
BASOPHILS # BLD AUTO: 0.02 K/UL (ref 0–0.2)
BASOPHILS NFR BLD: 0.3 % (ref 0–1.9)
BILIRUB SERPL-MCNC: 0.4 MG/DL (ref 0.1–1)
BLD GP AB SCN CELLS X3 SERPL QL: NORMAL
BUN SERPL-MCNC: 13 MG/DL (ref 6–20)
CALCIUM SERPL-MCNC: 9.3 MG/DL (ref 8.7–10.5)
CHLORIDE SERPL-SCNC: 107 MMOL/L (ref 95–110)
CO2 SERPL-SCNC: 21 MMOL/L (ref 23–29)
CREAT SERPL-MCNC: 0.9 MG/DL (ref 0.5–1.4)
DIFFERENTIAL METHOD: ABNORMAL
EOSINOPHIL # BLD AUTO: 0.1 K/UL (ref 0–0.5)
EOSINOPHIL NFR BLD: 0.7 % (ref 0–8)
ERYTHROCYTE [DISTWIDTH] IN BLOOD BY AUTOMATED COUNT: 11.9 % (ref 11.5–14.5)
EST. GFR  (AFRICAN AMERICAN): >60 ML/MIN/1.73 M^2
EST. GFR  (NON AFRICAN AMERICAN): >60 ML/MIN/1.73 M^2
GLUCOSE SERPL-MCNC: 100 MG/DL (ref 70–110)
HCT VFR BLD AUTO: 38.5 % (ref 37–48.5)
HGB BLD-MCNC: 13.6 G/DL (ref 12–16)
IMM GRANULOCYTES # BLD AUTO: 0.02 K/UL (ref 0–0.04)
IMM GRANULOCYTES NFR BLD AUTO: 0.3 % (ref 0–0.5)
LYMPHOCYTES # BLD AUTO: 3 K/UL (ref 1–4.8)
LYMPHOCYTES NFR BLD: 39.8 % (ref 18–48)
MCH RBC QN AUTO: 32.1 PG (ref 27–31)
MCHC RBC AUTO-ENTMCNC: 35.3 G/DL (ref 32–36)
MCV RBC AUTO: 91 FL (ref 82–98)
MONOCYTES # BLD AUTO: 0.3 K/UL (ref 0.3–1)
MONOCYTES NFR BLD: 4.6 % (ref 4–15)
NEUTROPHILS # BLD AUTO: 4.1 K/UL (ref 1.8–7.7)
NEUTROPHILS NFR BLD: 54.3 % (ref 38–73)
NRBC BLD-RTO: 0 /100 WBC
PLATELET # BLD AUTO: 186 K/UL (ref 150–350)
PMV BLD AUTO: 10.7 FL (ref 9.2–12.9)
POTASSIUM SERPL-SCNC: 3.8 MMOL/L (ref 3.5–5.1)
PROT SERPL-MCNC: 7.8 G/DL (ref 6–8.4)
RBC # BLD AUTO: 4.24 M/UL (ref 4–5.4)
SARS-COV-2 RDRP RESP QL NAA+PROBE: NEGATIVE
SODIUM SERPL-SCNC: 138 MMOL/L (ref 136–145)
WBC # BLD AUTO: 7.44 K/UL (ref 3.9–12.7)

## 2021-02-07 PROCEDURE — 59151 PR RX ECTOP PREG BY SCOPE,RMV TUBE/OVRY: ICD-10-PCS | Mod: 80,LT,, | Performed by: OBSTETRICS & GYNECOLOGY

## 2021-02-07 PROCEDURE — 27201423 OPTIME MED/SURG SUP & DEVICES STERILE SUPPLY: Performed by: OBSTETRICS & GYNECOLOGY

## 2021-02-07 PROCEDURE — 36000708 HC OR TIME LEV III 1ST 15 MIN: Performed by: OBSTETRICS & GYNECOLOGY

## 2021-02-07 PROCEDURE — 96361 HYDRATE IV INFUSION ADD-ON: CPT

## 2021-02-07 PROCEDURE — 63600175 PHARM REV CODE 636 W HCPCS: Performed by: NURSE ANESTHETIST, CERTIFIED REGISTERED

## 2021-02-07 PROCEDURE — 88305 TISSUE EXAM BY PATHOLOGIST: CPT | Performed by: PATHOLOGY

## 2021-02-07 PROCEDURE — 94760 N-INVAS EAR/PLS OXIMETRY 1: CPT

## 2021-02-07 PROCEDURE — 63600175 PHARM REV CODE 636 W HCPCS: Performed by: EMERGENCY MEDICINE

## 2021-02-07 PROCEDURE — 25000003 PHARM REV CODE 250: Performed by: NURSE ANESTHETIST, CERTIFIED REGISTERED

## 2021-02-07 PROCEDURE — P9021 RED BLOOD CELLS UNIT: HCPCS

## 2021-02-07 PROCEDURE — 96375 TX/PRO/DX INJ NEW DRUG ADDON: CPT

## 2021-02-07 PROCEDURE — G0378 HOSPITAL OBSERVATION PER HR: HCPCS

## 2021-02-07 PROCEDURE — 85025 COMPLETE CBC W/AUTO DIFF WBC: CPT

## 2021-02-07 PROCEDURE — 86920 COMPATIBILITY TEST SPIN: CPT

## 2021-02-07 PROCEDURE — 71000033 HC RECOVERY, INTIAL HOUR: Performed by: OBSTETRICS & GYNECOLOGY

## 2021-02-07 PROCEDURE — 36415 COLL VENOUS BLD VENIPUNCTURE: CPT

## 2021-02-07 PROCEDURE — 80053 COMPREHEN METABOLIC PANEL: CPT

## 2021-02-07 PROCEDURE — 59151 PR RX ECTOP PREG BY SCOPE,RMV TUBE/OVRY: ICD-10-PCS | Mod: LT,,, | Performed by: OBSTETRICS & GYNECOLOGY

## 2021-02-07 PROCEDURE — 86900 BLOOD TYPING SEROLOGIC ABO: CPT

## 2021-02-07 PROCEDURE — 25000003 PHARM REV CODE 250: Performed by: EMERGENCY MEDICINE

## 2021-02-07 PROCEDURE — 00840 ANES IPER PX LOWER ABD NOS: CPT | Mod: QZ | Performed by: NURSE ANESTHETIST, CERTIFIED REGISTERED

## 2021-02-07 PROCEDURE — 37000008 HC ANESTHESIA 1ST 15 MINUTES: Performed by: OBSTETRICS & GYNECOLOGY

## 2021-02-07 PROCEDURE — 36000709 HC OR TIME LEV III EA ADD 15 MIN: Performed by: OBSTETRICS & GYNECOLOGY

## 2021-02-07 PROCEDURE — 88305 TISSUE EXAM BY PATHOLOGIST: ICD-10-PCS | Mod: 26,,, | Performed by: PATHOLOGY

## 2021-02-07 PROCEDURE — 37000009 HC ANESTHESIA EA ADD 15 MINS: Performed by: OBSTETRICS & GYNECOLOGY

## 2021-02-07 PROCEDURE — 63600175 PHARM REV CODE 636 W HCPCS: Performed by: OBSTETRICS & GYNECOLOGY

## 2021-02-07 PROCEDURE — 88305 TISSUE EXAM BY PATHOLOGIST: CPT | Mod: 26,,, | Performed by: PATHOLOGY

## 2021-02-07 PROCEDURE — 96374 THER/PROPH/DIAG INJ IV PUSH: CPT

## 2021-02-07 PROCEDURE — 59151 TREAT ECTOPIC PREGNANCY: CPT | Mod: LT,,, | Performed by: OBSTETRICS & GYNECOLOGY

## 2021-02-07 PROCEDURE — 99285 EMERGENCY DEPT VISIT HI MDM: CPT | Mod: 25

## 2021-02-07 PROCEDURE — U0002 COVID-19 LAB TEST NON-CDC: HCPCS

## 2021-02-07 PROCEDURE — 59151 TREAT ECTOPIC PREGNANCY: CPT | Mod: 80,LT,, | Performed by: OBSTETRICS & GYNECOLOGY

## 2021-02-07 RX ORDER — HYDROMORPHONE HYDROCHLORIDE 1 MG/ML
0.5 INJECTION, SOLUTION INTRAMUSCULAR; INTRAVENOUS; SUBCUTANEOUS
Status: COMPLETED | OUTPATIENT
Start: 2021-02-07 | End: 2021-02-07

## 2021-02-07 RX ORDER — PHENYLEPHRINE HYDROCHLORIDE 10 MG/ML
INJECTION INTRAVENOUS
Status: DISCONTINUED | OUTPATIENT
Start: 2021-02-07 | End: 2021-02-07

## 2021-02-07 RX ORDER — ACETAMINOPHEN 10 MG/ML
INJECTION, SOLUTION INTRAVENOUS
Status: DISCONTINUED | OUTPATIENT
Start: 2021-02-07 | End: 2021-02-07

## 2021-02-07 RX ORDER — SUCCINYLCHOLINE CHLORIDE 20 MG/ML
INJECTION INTRAMUSCULAR; INTRAVENOUS
Status: DISCONTINUED | OUTPATIENT
Start: 2021-02-07 | End: 2021-02-07

## 2021-02-07 RX ORDER — SODIUM CHLORIDE 0.9 % (FLUSH) 0.9 %
10 SYRINGE (ML) INJECTION
Status: DISCONTINUED | OUTPATIENT
Start: 2021-02-07 | End: 2021-02-07

## 2021-02-07 RX ORDER — DIPHENHYDRAMINE HCL 25 MG
25 CAPSULE ORAL EVERY 4 HOURS PRN
Status: DISCONTINUED | OUTPATIENT
Start: 2021-02-08 | End: 2021-02-08 | Stop reason: HOSPADM

## 2021-02-07 RX ORDER — DIPHENHYDRAMINE HYDROCHLORIDE 50 MG/ML
25 INJECTION INTRAMUSCULAR; INTRAVENOUS EVERY 4 HOURS PRN
Status: DISCONTINUED | OUTPATIENT
Start: 2021-02-08 | End: 2021-02-08 | Stop reason: HOSPADM

## 2021-02-07 RX ORDER — DEXAMETHASONE SODIUM PHOSPHATE 4 MG/ML
INJECTION, SOLUTION INTRA-ARTICULAR; INTRALESIONAL; INTRAMUSCULAR; INTRAVENOUS; SOFT TISSUE
Status: DISCONTINUED | OUTPATIENT
Start: 2021-02-07 | End: 2021-02-07

## 2021-02-07 RX ORDER — SODIUM CHLORIDE, SODIUM LACTATE, POTASSIUM CHLORIDE, CALCIUM CHLORIDE 600; 310; 30; 20 MG/100ML; MG/100ML; MG/100ML; MG/100ML
INJECTION, SOLUTION INTRAVENOUS CONTINUOUS PRN
Status: DISCONTINUED | OUTPATIENT
Start: 2021-02-07 | End: 2021-02-07

## 2021-02-07 RX ORDER — HYDROCODONE BITARTRATE AND ACETAMINOPHEN 5; 325 MG/1; MG/1
1 TABLET ORAL EVERY 4 HOURS PRN
Status: DISCONTINUED | OUTPATIENT
Start: 2021-02-08 | End: 2021-02-08 | Stop reason: HOSPADM

## 2021-02-07 RX ORDER — IBUPROFEN 600 MG/1
600 TABLET ORAL EVERY 6 HOURS PRN
Status: DISCONTINUED | OUTPATIENT
Start: 2021-02-08 | End: 2021-02-08 | Stop reason: HOSPADM

## 2021-02-07 RX ORDER — KETOROLAC TROMETHAMINE 30 MG/ML
INJECTION, SOLUTION INTRAMUSCULAR; INTRAVENOUS
Status: DISCONTINUED | OUTPATIENT
Start: 2021-02-07 | End: 2021-02-07

## 2021-02-07 RX ORDER — HYDROMORPHONE HYDROCHLORIDE 1 MG/ML
1 INJECTION, SOLUTION INTRAMUSCULAR; INTRAVENOUS; SUBCUTANEOUS EVERY 6 HOURS PRN
Status: DISCONTINUED | OUTPATIENT
Start: 2021-02-08 | End: 2021-02-08 | Stop reason: HOSPADM

## 2021-02-07 RX ORDER — FENTANYL CITRATE 50 UG/ML
INJECTION, SOLUTION INTRAMUSCULAR; INTRAVENOUS
Status: DISCONTINUED | OUTPATIENT
Start: 2021-02-07 | End: 2021-02-07

## 2021-02-07 RX ORDER — ONDANSETRON 8 MG/1
8 TABLET, ORALLY DISINTEGRATING ORAL EVERY 8 HOURS PRN
Status: DISCONTINUED | OUTPATIENT
Start: 2021-02-08 | End: 2021-02-08 | Stop reason: HOSPADM

## 2021-02-07 RX ORDER — HYDROCODONE BITARTRATE AND ACETAMINOPHEN 10; 325 MG/1; MG/1
1 TABLET ORAL EVERY 4 HOURS PRN
Status: DISCONTINUED | OUTPATIENT
Start: 2021-02-08 | End: 2021-02-08 | Stop reason: HOSPADM

## 2021-02-07 RX ORDER — MIDAZOLAM HYDROCHLORIDE 1 MG/ML
INJECTION INTRAMUSCULAR; INTRAVENOUS
Status: DISCONTINUED | OUTPATIENT
Start: 2021-02-07 | End: 2021-02-07

## 2021-02-07 RX ORDER — SODIUM CHLORIDE, SODIUM LACTATE, POTASSIUM CHLORIDE, CALCIUM CHLORIDE 600; 310; 30; 20 MG/100ML; MG/100ML; MG/100ML; MG/100ML
INJECTION, SOLUTION INTRAVENOUS CONTINUOUS
Status: DISCONTINUED | OUTPATIENT
Start: 2021-02-07 | End: 2021-02-08 | Stop reason: HOSPADM

## 2021-02-07 RX ORDER — KETAMINE HYDROCHLORIDE 100 MG/ML
INJECTION, SOLUTION INTRAMUSCULAR; INTRAVENOUS
Status: DISCONTINUED | OUTPATIENT
Start: 2021-02-07 | End: 2021-02-07

## 2021-02-07 RX ORDER — LIDOCAINE HYDROCHLORIDE 20 MG/ML
INJECTION, SOLUTION EPIDURAL; INFILTRATION; INTRACAUDAL; PERINEURAL
Status: DISCONTINUED | OUTPATIENT
Start: 2021-02-07 | End: 2021-02-07

## 2021-02-07 RX ORDER — ROCURONIUM BROMIDE 10 MG/ML
INJECTION, SOLUTION INTRAVENOUS
Status: DISCONTINUED | OUTPATIENT
Start: 2021-02-07 | End: 2021-02-07

## 2021-02-07 RX ORDER — PROPOFOL 10 MG/ML
VIAL (ML) INTRAVENOUS
Status: DISCONTINUED | OUTPATIENT
Start: 2021-02-07 | End: 2021-02-07

## 2021-02-07 RX ORDER — NEOSTIGMINE METHYLSULFATE 5 MG/5 ML
SYRINGE (ML) INTRAVENOUS
Status: DISCONTINUED | OUTPATIENT
Start: 2021-02-07 | End: 2021-02-07

## 2021-02-07 RX ORDER — ONDANSETRON HYDROCHLORIDE 2 MG/ML
INJECTION, SOLUTION INTRAMUSCULAR; INTRAVENOUS
Status: DISCONTINUED | OUTPATIENT
Start: 2021-02-07 | End: 2021-02-07

## 2021-02-07 RX ORDER — TALC
6 POWDER (GRAM) TOPICAL NIGHTLY PRN
Status: DISCONTINUED | OUTPATIENT
Start: 2021-02-07 | End: 2021-02-07

## 2021-02-07 RX ORDER — ONDANSETRON 2 MG/ML
4 INJECTION INTRAMUSCULAR; INTRAVENOUS
Status: COMPLETED | OUTPATIENT
Start: 2021-02-07 | End: 2021-02-07

## 2021-02-07 RX ADMIN — LIDOCAINE HYDROCHLORIDE 60 MG: 20 INJECTION, SOLUTION EPIDURAL; INFILTRATION; INTRACAUDAL; PERINEURAL at 09:02

## 2021-02-07 RX ADMIN — PHENYLEPHRINE HYDROCHLORIDE 200 MCG: 10 INJECTION INTRAVENOUS at 09:02

## 2021-02-07 RX ADMIN — PROPOFOL 100 MG: 10 INJECTION, EMULSION INTRAVENOUS at 09:02

## 2021-02-07 RX ADMIN — HYDROMORPHONE HYDROCHLORIDE 0.5 MG: 1 INJECTION, SOLUTION INTRAMUSCULAR; INTRAVENOUS; SUBCUTANEOUS at 06:02

## 2021-02-07 RX ADMIN — SODIUM CHLORIDE, SODIUM LACTATE, POTASSIUM CHLORIDE, AND CALCIUM CHLORIDE: .6; .31; .03; .02 INJECTION, SOLUTION INTRAVENOUS at 11:02

## 2021-02-07 RX ADMIN — FENTANYL CITRATE 50 MCG: 50 INJECTION, SOLUTION INTRAMUSCULAR; INTRAVENOUS at 10:02

## 2021-02-07 RX ADMIN — HYDROCODONE BITARTRATE AND ACETAMINOPHEN 1 TABLET: 5; 325 TABLET ORAL at 11:02

## 2021-02-07 RX ADMIN — PHENYLEPHRINE HYDROCHLORIDE 100 MCG: 10 INJECTION INTRAVENOUS at 09:02

## 2021-02-07 RX ADMIN — SUGAMMADEX 200 MG: 100 INJECTION, SOLUTION INTRAVENOUS at 10:02

## 2021-02-07 RX ADMIN — KETOROLAC TROMETHAMINE 30 MG: 30 INJECTION, SOLUTION INTRAMUSCULAR; INTRAVENOUS at 10:02

## 2021-02-07 RX ADMIN — ROCURONIUM BROMIDE 30 MG: 10 INJECTION, SOLUTION INTRAVENOUS at 09:02

## 2021-02-07 RX ADMIN — HYDROMORPHONE HYDROCHLORIDE 0.5 MG: 1 INJECTION, SOLUTION INTRAMUSCULAR; INTRAVENOUS; SUBCUTANEOUS at 07:02

## 2021-02-07 RX ADMIN — SODIUM CHLORIDE 1000 ML: 0.9 INJECTION, SOLUTION INTRAVENOUS at 06:02

## 2021-02-07 RX ADMIN — SUCCINYLCHOLINE CHLORIDE 100 MG: 20 INJECTION, SOLUTION INTRAMUSCULAR; INTRAVENOUS at 09:02

## 2021-02-07 RX ADMIN — KETAMINE HYDROCHLORIDE 50 MG: 100 INJECTION, SOLUTION, CONCENTRATE INTRAMUSCULAR; INTRAVENOUS at 09:02

## 2021-02-07 RX ADMIN — ONDANSETRON 4 MG: 2 INJECTION INTRAMUSCULAR; INTRAVENOUS at 06:02

## 2021-02-07 RX ADMIN — GLYCOPYRROLATE 0.4 MG: 0.2 INJECTION, SOLUTION INTRAMUSCULAR; INTRAVENOUS at 09:02

## 2021-02-07 RX ADMIN — Medication 3 MG: at 09:02

## 2021-02-07 RX ADMIN — MIDAZOLAM HYDROCHLORIDE 2 MG: 1 INJECTION, SOLUTION INTRAMUSCULAR; INTRAVENOUS at 09:02

## 2021-02-07 RX ADMIN — FENTANYL CITRATE 50 MCG: 50 INJECTION, SOLUTION INTRAMUSCULAR; INTRAVENOUS at 09:02

## 2021-02-07 RX ADMIN — DEXAMETHASONE SODIUM PHOSPHATE 8 MG: 4 INJECTION, SOLUTION INTRAMUSCULAR; INTRAVENOUS at 09:02

## 2021-02-07 RX ADMIN — ACETAMINOPHEN 1000 MG: 10 INJECTION, SOLUTION INTRAVENOUS at 10:02

## 2021-02-07 RX ADMIN — ONDANSETRON 4 MG: 2 INJECTION, SOLUTION INTRAMUSCULAR; INTRAVENOUS at 09:02

## 2021-02-07 RX ADMIN — SODIUM CHLORIDE, SODIUM LACTATE, POTASSIUM CHLORIDE, AND CALCIUM CHLORIDE: .6; .31; .03; .02 INJECTION, SOLUTION INTRAVENOUS at 09:02

## 2021-02-07 RX ADMIN — SODIUM CHLORIDE: 0.9 INJECTION, SOLUTION INTRAVENOUS at 09:02

## 2021-02-08 VITALS
SYSTOLIC BLOOD PRESSURE: 105 MMHG | HEART RATE: 78 BPM | RESPIRATION RATE: 18 BRPM | BODY MASS INDEX: 33 KG/M2 | WEIGHT: 174.81 LBS | HEIGHT: 61 IN | OXYGEN SATURATION: 97 % | DIASTOLIC BLOOD PRESSURE: 55 MMHG | TEMPERATURE: 99 F

## 2021-02-08 LAB
BASOPHILS # BLD AUTO: 0 K/UL (ref 0–0.2)
BASOPHILS NFR BLD: 0 % (ref 0–1.9)
BLD PROD TYP BPU: NORMAL
BLD PROD TYP BPU: NORMAL
BLOOD UNIT EXPIRATION DATE: NORMAL
BLOOD UNIT EXPIRATION DATE: NORMAL
BLOOD UNIT TYPE CODE: 5100
BLOOD UNIT TYPE CODE: 5100
BLOOD UNIT TYPE: NORMAL
BLOOD UNIT TYPE: NORMAL
CODING SYSTEM: NORMAL
CODING SYSTEM: NORMAL
DIFFERENTIAL METHOD: ABNORMAL
DISPENSE STATUS: NORMAL
DISPENSE STATUS: NORMAL
EOSINOPHIL # BLD AUTO: 0 K/UL (ref 0–0.5)
EOSINOPHIL NFR BLD: 0 % (ref 0–8)
ERYTHROCYTE [DISTWIDTH] IN BLOOD BY AUTOMATED COUNT: 12.4 % (ref 11.5–14.5)
HCT VFR BLD AUTO: 30.9 % (ref 37–48.5)
HGB BLD-MCNC: 10.5 G/DL (ref 12–16)
IMM GRANULOCYTES # BLD AUTO: 0.05 K/UL (ref 0–0.04)
IMM GRANULOCYTES NFR BLD AUTO: 0.4 % (ref 0–0.5)
LYMPHOCYTES # BLD AUTO: 0.5 K/UL (ref 1–4.8)
LYMPHOCYTES NFR BLD: 4.2 % (ref 18–48)
MCH RBC QN AUTO: 31.6 PG (ref 27–31)
MCHC RBC AUTO-ENTMCNC: 34 G/DL (ref 32–36)
MCV RBC AUTO: 93 FL (ref 82–98)
MONOCYTES # BLD AUTO: 0.1 K/UL (ref 0.3–1)
MONOCYTES NFR BLD: 0.5 % (ref 4–15)
NEUTROPHILS # BLD AUTO: 11.2 K/UL (ref 1.8–7.7)
NEUTROPHILS NFR BLD: 94.9 % (ref 38–73)
NRBC BLD-RTO: 0 /100 WBC
PLATELET # BLD AUTO: 123 K/UL (ref 150–350)
PMV BLD AUTO: 10.4 FL (ref 9.2–12.9)
RBC # BLD AUTO: 3.32 M/UL (ref 4–5.4)
TRANS ERYTHROCYTES VOL PATIENT: NORMAL ML
TRANS ERYTHROCYTES VOL PATIENT: NORMAL ML
WBC # BLD AUTO: 11.79 K/UL (ref 3.9–12.7)

## 2021-02-08 PROCEDURE — 25000003 PHARM REV CODE 250: Performed by: OBSTETRICS & GYNECOLOGY

## 2021-02-08 PROCEDURE — 99900035 HC TECH TIME PER 15 MIN (STAT)

## 2021-02-08 PROCEDURE — 36430 TRANSFUSION BLD/BLD COMPNT: CPT

## 2021-02-08 PROCEDURE — 36415 COLL VENOUS BLD VENIPUNCTURE: CPT

## 2021-02-08 PROCEDURE — 63600175 PHARM REV CODE 636 W HCPCS: Performed by: OBSTETRICS & GYNECOLOGY

## 2021-02-08 PROCEDURE — 85025 COMPLETE CBC W/AUTO DIFF WBC: CPT

## 2021-02-08 PROCEDURE — 94799 UNLISTED PULMONARY SVC/PX: CPT

## 2021-02-08 RX ORDER — HYDROXYZINE PAMOATE 25 MG/1
25 CAPSULE ORAL EVERY 8 HOURS PRN
Qty: 20 CAPSULE | Refills: 0 | Status: SHIPPED | OUTPATIENT
Start: 2021-02-08 | End: 2022-08-30

## 2021-02-08 RX ORDER — IBUPROFEN 600 MG/1
600 TABLET ORAL EVERY 6 HOURS PRN
Qty: 30 TABLET | Refills: 0 | Status: SHIPPED | OUTPATIENT
Start: 2021-02-08 | End: 2021-02-17

## 2021-02-08 RX ORDER — HYDROCODONE BITARTRATE AND ACETAMINOPHEN 5; 325 MG/1; MG/1
1 TABLET ORAL EVERY 6 HOURS PRN
Qty: 15 TABLET | Refills: 0 | Status: SHIPPED | OUTPATIENT
Start: 2021-02-08 | End: 2021-02-17

## 2021-02-08 RX ORDER — ESCITALOPRAM OXALATE 10 MG/1
10 TABLET ORAL DAILY
Qty: 30 TABLET | Refills: 6 | Status: SHIPPED | OUTPATIENT
Start: 2021-02-08 | End: 2021-02-17

## 2021-02-08 RX ORDER — HYDROCODONE BITARTRATE AND ACETAMINOPHEN 500; 5 MG/1; MG/1
TABLET ORAL
Status: DISCONTINUED | OUTPATIENT
Start: 2021-02-08 | End: 2021-02-08 | Stop reason: HOSPADM

## 2021-02-08 RX ADMIN — HYDROCODONE BITARTRATE AND ACETAMINOPHEN 1 TABLET: 5; 325 TABLET ORAL at 09:02

## 2021-02-08 RX ADMIN — SODIUM CHLORIDE, SODIUM LACTATE, POTASSIUM CHLORIDE, AND CALCIUM CHLORIDE: .6; .31; .03; .02 INJECTION, SOLUTION INTRAVENOUS at 06:02

## 2021-02-08 RX ADMIN — HYDROCODONE BITARTRATE AND ACETAMINOPHEN 1 TABLET: 10; 325 TABLET ORAL at 04:02

## 2021-02-10 ENCOUNTER — HOSPITAL ENCOUNTER (EMERGENCY)
Facility: HOSPITAL | Age: 31
Discharge: HOME | End: 2021-02-10
Attending: SURGERY
Payer: COMMERCIAL

## 2021-02-10 VITALS
RESPIRATION RATE: 17 BRPM | SYSTOLIC BLOOD PRESSURE: 127 MMHG | WEIGHT: 174 LBS | TEMPERATURE: 99 F | DIASTOLIC BLOOD PRESSURE: 81 MMHG | BODY MASS INDEX: 32.88 KG/M2 | OXYGEN SATURATION: 98 % | HEART RATE: 99 BPM

## 2021-02-10 DIAGNOSIS — I10 HYPERTENSION, UNSPECIFIED TYPE: Primary | ICD-10-CM

## 2021-02-10 DIAGNOSIS — R00.2 PALPITATIONS: ICD-10-CM

## 2021-02-10 LAB
ALBUMIN SERPL BCP-MCNC: 4 G/DL (ref 3.5–5.2)
ALP SERPL-CCNC: 50 U/L (ref 55–135)
ALT SERPL W/O P-5'-P-CCNC: 11 U/L (ref 10–44)
ANION GAP SERPL CALC-SCNC: 11 MMOL/L (ref 8–16)
APTT BLDCRRT: 23.9 SEC (ref 21–32)
AST SERPL-CCNC: 20 U/L (ref 10–40)
BASOPHILS # BLD AUTO: 0.03 K/UL (ref 0–0.2)
BASOPHILS NFR BLD: 0.3 % (ref 0–1.9)
BILIRUB SERPL-MCNC: 0.4 MG/DL (ref 0.1–1)
BILIRUB UR QL STRIP: NEGATIVE
BNP SERPL-MCNC: 123 PG/ML (ref 0–99)
BUN SERPL-MCNC: 9 MG/DL (ref 6–20)
CALCIUM SERPL-MCNC: 8.9 MG/DL (ref 8.7–10.5)
CHLORIDE SERPL-SCNC: 105 MMOL/L (ref 95–110)
CK MB SERPL-MCNC: 0.6 NG/ML (ref 0.1–6.5)
CK MB SERPL-MCNC: 1 NG/ML (ref 0.1–6.5)
CK MB SERPL-RTO: 0.3 % (ref 0–5)
CK MB SERPL-RTO: 0.4 % (ref 0–5)
CK SERPL-CCNC: 199 U/L (ref 20–180)
CK SERPL-CCNC: 199 U/L (ref 20–180)
CK SERPL-CCNC: 252 U/L (ref 20–180)
CK SERPL-CCNC: 252 U/L (ref 20–180)
CLARITY UR: CLEAR
CO2 SERPL-SCNC: 22 MMOL/L (ref 23–29)
COLOR UR: YELLOW
CREAT SERPL-MCNC: 0.8 MG/DL (ref 0.5–1.4)
D DIMER PPP IA.FEU-MCNC: 0.99 MG/L FEU
DIFFERENTIAL METHOD: ABNORMAL
EOSINOPHIL # BLD AUTO: 0.1 K/UL (ref 0–0.5)
EOSINOPHIL NFR BLD: 1.1 % (ref 0–8)
ERYTHROCYTE [DISTWIDTH] IN BLOOD BY AUTOMATED COUNT: 12.3 % (ref 11.5–14.5)
EST. GFR  (AFRICAN AMERICAN): >60 ML/MIN/1.73 M^2
EST. GFR  (NON AFRICAN AMERICAN): >60 ML/MIN/1.73 M^2
GLUCOSE SERPL-MCNC: 119 MG/DL (ref 70–110)
GLUCOSE UR QL STRIP: NEGATIVE
HCT VFR BLD AUTO: 32.3 % (ref 37–48.5)
HGB BLD-MCNC: 11.2 G/DL (ref 12–16)
HGB UR QL STRIP: ABNORMAL
IMM GRANULOCYTES # BLD AUTO: 0.06 K/UL (ref 0–0.04)
IMM GRANULOCYTES NFR BLD AUTO: 0.6 % (ref 0–0.5)
INR PPP: 0.9 (ref 0.8–1.2)
KETONES UR QL STRIP: NEGATIVE
LEUKOCYTE ESTERASE UR QL STRIP: NEGATIVE
LIPASE SERPL-CCNC: 30 U/L (ref 4–60)
LYMPHOCYTES # BLD AUTO: 3.4 K/UL (ref 1–4.8)
LYMPHOCYTES NFR BLD: 33.7 % (ref 18–48)
MCH RBC QN AUTO: 31.6 PG (ref 27–31)
MCHC RBC AUTO-ENTMCNC: 34.7 G/DL (ref 32–36)
MCV RBC AUTO: 91 FL (ref 82–98)
MONOCYTES # BLD AUTO: 0.4 K/UL (ref 0.3–1)
MONOCYTES NFR BLD: 3.7 % (ref 4–15)
NEUTROPHILS # BLD AUTO: 6.1 K/UL (ref 1.8–7.7)
NEUTROPHILS NFR BLD: 60.6 % (ref 38–73)
NITRITE UR QL STRIP: NEGATIVE
NRBC BLD-RTO: 0 /100 WBC
PH UR STRIP: 7 [PH] (ref 5–8)
PLATELET # BLD AUTO: 175 K/UL (ref 150–350)
PMV BLD AUTO: 10.8 FL (ref 9.2–12.9)
POTASSIUM SERPL-SCNC: 3.3 MMOL/L (ref 3.5–5.1)
PROT SERPL-MCNC: 7.3 G/DL (ref 6–8.4)
PROT UR QL STRIP: NEGATIVE
PROTHROMBIN TIME: 10 SEC (ref 9–12.5)
RBC # BLD AUTO: 3.54 M/UL (ref 4–5.4)
SARS-COV-2 RDRP RESP QL NAA+PROBE: NEGATIVE
SODIUM SERPL-SCNC: 138 MMOL/L (ref 136–145)
SP GR UR STRIP: 1.01 (ref 1–1.03)
TROPONIN I SERPL DL<=0.01 NG/ML-MCNC: <0.006 NG/ML (ref 0–0.03)
TROPONIN I SERPL DL<=0.01 NG/ML-MCNC: <0.006 NG/ML (ref 0–0.03)
URN SPEC COLLECT METH UR: ABNORMAL
UROBILINOGEN UR STRIP-ACNC: NEGATIVE EU/DL
WBC # BLD AUTO: 10.11 K/UL (ref 3.9–12.7)

## 2021-02-10 PROCEDURE — 93005 ELECTROCARDIOGRAM TRACING: CPT

## 2021-02-10 PROCEDURE — 96361 HYDRATE IV INFUSION ADD-ON: CPT

## 2021-02-10 PROCEDURE — 85610 PROTHROMBIN TIME: CPT

## 2021-02-10 PROCEDURE — 84484 ASSAY OF TROPONIN QUANT: CPT | Mod: 91

## 2021-02-10 PROCEDURE — 83690 ASSAY OF LIPASE: CPT

## 2021-02-10 PROCEDURE — 85730 THROMBOPLASTIN TIME PARTIAL: CPT

## 2021-02-10 PROCEDURE — 63600175 PHARM REV CODE 636 W HCPCS: Performed by: SURGERY

## 2021-02-10 PROCEDURE — 81003 URINALYSIS AUTO W/O SCOPE: CPT

## 2021-02-10 PROCEDURE — 99285 EMERGENCY DEPT VISIT HI MDM: CPT | Mod: 25

## 2021-02-10 PROCEDURE — 82553 CREATINE MB FRACTION: CPT

## 2021-02-10 PROCEDURE — 36415 COLL VENOUS BLD VENIPUNCTURE: CPT

## 2021-02-10 PROCEDURE — 85379 FIBRIN DEGRADATION QUANT: CPT

## 2021-02-10 PROCEDURE — 93010 EKG 12-LEAD: ICD-10-PCS | Mod: ,,, | Performed by: INTERNAL MEDICINE

## 2021-02-10 PROCEDURE — 82550 ASSAY OF CK (CPK): CPT

## 2021-02-10 PROCEDURE — 80053 COMPREHEN METABOLIC PANEL: CPT

## 2021-02-10 PROCEDURE — U0002 COVID-19 LAB TEST NON-CDC: HCPCS

## 2021-02-10 PROCEDURE — 25000003 PHARM REV CODE 250: Performed by: SURGERY

## 2021-02-10 PROCEDURE — 83880 ASSAY OF NATRIURETIC PEPTIDE: CPT

## 2021-02-10 PROCEDURE — 25500020 PHARM REV CODE 255: Performed by: SURGERY

## 2021-02-10 PROCEDURE — 93010 ELECTROCARDIOGRAM REPORT: CPT | Mod: ,,, | Performed by: INTERNAL MEDICINE

## 2021-02-10 PROCEDURE — 25000003 PHARM REV CODE 250: Performed by: NURSE PRACTITIONER

## 2021-02-10 PROCEDURE — 96374 THER/PROPH/DIAG INJ IV PUSH: CPT

## 2021-02-10 PROCEDURE — 85025 COMPLETE CBC W/AUTO DIFF WBC: CPT

## 2021-02-10 RX ORDER — METOPROLOL SUCCINATE 25 MG/1
25 TABLET, EXTENDED RELEASE ORAL DAILY
Qty: 30 TABLET | Refills: 11 | Status: SHIPPED | OUTPATIENT
Start: 2021-02-10 | End: 2021-05-24

## 2021-02-10 RX ORDER — METOPROLOL SUCCINATE 25 MG/1
25 TABLET, EXTENDED RELEASE ORAL
Status: DISCONTINUED | OUTPATIENT
Start: 2021-02-10 | End: 2021-02-10

## 2021-02-10 RX ORDER — POTASSIUM CHLORIDE 20 MEQ/1
40 TABLET, EXTENDED RELEASE ORAL ONCE
Status: COMPLETED | OUTPATIENT
Start: 2021-02-10 | End: 2021-02-10

## 2021-02-10 RX ORDER — LORAZEPAM 2 MG/ML
1 INJECTION INTRAMUSCULAR
Status: COMPLETED | OUTPATIENT
Start: 2021-02-10 | End: 2021-02-10

## 2021-02-10 RX ORDER — METOPROLOL TARTRATE 25 MG/1
25 TABLET, FILM COATED ORAL
Status: COMPLETED | OUTPATIENT
Start: 2021-02-10 | End: 2021-02-10

## 2021-02-10 RX ADMIN — POTASSIUM CHLORIDE 40 MEQ: 1500 TABLET, EXTENDED RELEASE ORAL at 04:02

## 2021-02-10 RX ADMIN — SODIUM CHLORIDE 1000 ML: 0.9 INJECTION, SOLUTION INTRAVENOUS at 02:02

## 2021-02-10 RX ADMIN — IOHEXOL 75 ML: 350 INJECTION, SOLUTION INTRAVENOUS at 01:02

## 2021-02-10 RX ADMIN — LORAZEPAM 1 MG: 2 INJECTION INTRAMUSCULAR; INTRAVENOUS at 01:02

## 2021-02-10 RX ADMIN — SODIUM CHLORIDE 1000 ML: 0.9 INJECTION, SOLUTION INTRAVENOUS at 01:02

## 2021-02-10 RX ADMIN — METOPROLOL TARTRATE 25 MG: 25 TABLET, FILM COATED ORAL at 04:02

## 2021-02-11 LAB
FINAL PATHOLOGIC DIAGNOSIS: NORMAL
GROSS: NORMAL
Lab: NORMAL
MICROSCOPIC EXAM: NORMAL

## 2021-02-12 ENCOUNTER — TELEPHONE (OUTPATIENT)
Dept: OBSTETRICS AND GYNECOLOGY | Facility: CLINIC | Age: 31
End: 2021-02-12

## 2021-02-15 ENCOUNTER — TELEPHONE (OUTPATIENT)
Dept: INTERNAL MEDICINE | Facility: CLINIC | Age: 31
End: 2021-02-15

## 2021-02-17 ENCOUNTER — PATIENT MESSAGE (OUTPATIENT)
Dept: OBSTETRICS AND GYNECOLOGY | Facility: CLINIC | Age: 31
End: 2021-02-17

## 2021-02-17 ENCOUNTER — OFFICE VISIT (OUTPATIENT)
Dept: INTERNAL MEDICINE | Facility: CLINIC | Age: 31
End: 2021-02-17
Payer: COMMERCIAL

## 2021-02-17 VITALS
RESPIRATION RATE: 17 BRPM | SYSTOLIC BLOOD PRESSURE: 122 MMHG | OXYGEN SATURATION: 99 % | BODY MASS INDEX: 30.55 KG/M2 | WEIGHT: 161.81 LBS | DIASTOLIC BLOOD PRESSURE: 92 MMHG | HEART RATE: 89 BPM | TEMPERATURE: 98 F | HEIGHT: 61 IN

## 2021-02-17 DIAGNOSIS — Z09 HOSPITAL DISCHARGE FOLLOW-UP: Primary | ICD-10-CM

## 2021-02-17 DIAGNOSIS — F41.9 ANXIETY: ICD-10-CM

## 2021-02-17 DIAGNOSIS — F43.21 GRIEF REACTION: ICD-10-CM

## 2021-02-17 DIAGNOSIS — R03.0 ELEVATED BP WITHOUT DIAGNOSIS OF HYPERTENSION: ICD-10-CM

## 2021-02-17 PROCEDURE — 99999 PR PBB SHADOW E&M-EST. PATIENT-LVL IV: ICD-10-PCS | Mod: PBBFAC,,, | Performed by: INTERNAL MEDICINE

## 2021-02-17 PROCEDURE — 99214 PR OFFICE/OUTPT VISIT, EST, LEVL IV, 30-39 MIN: ICD-10-PCS | Mod: S$GLB,,, | Performed by: INTERNAL MEDICINE

## 2021-02-17 PROCEDURE — 99999 PR PBB SHADOW E&M-EST. PATIENT-LVL IV: CPT | Mod: PBBFAC,,, | Performed by: INTERNAL MEDICINE

## 2021-02-17 PROCEDURE — 99214 OFFICE O/P EST MOD 30 MIN: CPT | Mod: S$GLB,,, | Performed by: INTERNAL MEDICINE

## 2021-03-03 ENCOUNTER — OFFICE VISIT (OUTPATIENT)
Dept: INTERNAL MEDICINE | Facility: CLINIC | Age: 31
End: 2021-03-03
Payer: COMMERCIAL

## 2021-03-03 VITALS
HEIGHT: 61 IN | WEIGHT: 164.44 LBS | DIASTOLIC BLOOD PRESSURE: 90 MMHG | BODY MASS INDEX: 31.05 KG/M2 | SYSTOLIC BLOOD PRESSURE: 130 MMHG | OXYGEN SATURATION: 100 % | RESPIRATION RATE: 18 BRPM | HEART RATE: 70 BPM | TEMPERATURE: 98 F

## 2021-03-03 DIAGNOSIS — M54.32 LEFT SCIATIC NERVE PAIN: Primary | ICD-10-CM

## 2021-03-03 DIAGNOSIS — G57.12 MERALGIA PARESTHETICA OF LEFT SIDE: ICD-10-CM

## 2021-03-03 PROCEDURE — 99213 PR OFFICE/OUTPT VISIT, EST, LEVL III, 20-29 MIN: ICD-10-PCS | Mod: S$GLB,,, | Performed by: INTERNAL MEDICINE

## 2021-03-03 PROCEDURE — 99999 PR PBB SHADOW E&M-EST. PATIENT-LVL IV: CPT | Mod: PBBFAC,,, | Performed by: INTERNAL MEDICINE

## 2021-03-03 PROCEDURE — 99999 PR PBB SHADOW E&M-EST. PATIENT-LVL IV: ICD-10-PCS | Mod: PBBFAC,,, | Performed by: INTERNAL MEDICINE

## 2021-03-03 PROCEDURE — 99213 OFFICE O/P EST LOW 20 MIN: CPT | Mod: S$GLB,,, | Performed by: INTERNAL MEDICINE

## 2021-03-03 RX ORDER — GABAPENTIN 100 MG/1
100 CAPSULE ORAL NIGHTLY
Qty: 30 CAPSULE | Refills: 11 | Status: SHIPPED | OUTPATIENT
Start: 2021-03-03 | End: 2021-06-02

## 2021-05-06 ENCOUNTER — PATIENT MESSAGE (OUTPATIENT)
Dept: RESEARCH | Facility: HOSPITAL | Age: 31
End: 2021-05-06

## 2021-05-10 ENCOUNTER — PATIENT MESSAGE (OUTPATIENT)
Dept: RESEARCH | Facility: HOSPITAL | Age: 31
End: 2021-05-10

## 2021-05-24 ENCOUNTER — OFFICE VISIT (OUTPATIENT)
Dept: INTERNAL MEDICINE | Facility: CLINIC | Age: 31
End: 2021-05-24
Payer: COMMERCIAL

## 2021-05-24 VITALS
WEIGHT: 157.19 LBS | DIASTOLIC BLOOD PRESSURE: 82 MMHG | HEART RATE: 86 BPM | HEIGHT: 61 IN | BODY MASS INDEX: 29.68 KG/M2 | RESPIRATION RATE: 18 BRPM | OXYGEN SATURATION: 100 % | SYSTOLIC BLOOD PRESSURE: 126 MMHG

## 2021-05-24 DIAGNOSIS — Z87.59 HISTORY OF GESTATIONAL HYPERTENSION: ICD-10-CM

## 2021-05-24 DIAGNOSIS — F41.9 ANXIETY: Primary | ICD-10-CM

## 2021-05-24 DIAGNOSIS — M54.32 LEFT SIDED SCIATICA: ICD-10-CM

## 2021-05-24 PROCEDURE — 99213 PR OFFICE/OUTPT VISIT, EST, LEVL III, 20-29 MIN: ICD-10-PCS | Mod: S$GLB,,, | Performed by: INTERNAL MEDICINE

## 2021-05-24 PROCEDURE — 99999 PR PBB SHADOW E&M-EST. PATIENT-LVL III: CPT | Mod: PBBFAC,,, | Performed by: INTERNAL MEDICINE

## 2021-05-24 PROCEDURE — 99999 PR PBB SHADOW E&M-EST. PATIENT-LVL III: ICD-10-PCS | Mod: PBBFAC,,, | Performed by: INTERNAL MEDICINE

## 2021-05-24 PROCEDURE — 99213 OFFICE O/P EST LOW 20 MIN: CPT | Mod: S$GLB,,, | Performed by: INTERNAL MEDICINE

## 2021-06-02 ENCOUNTER — OFFICE VISIT (OUTPATIENT)
Dept: OBSTETRICS AND GYNECOLOGY | Facility: CLINIC | Age: 31
End: 2021-06-02
Payer: COMMERCIAL

## 2021-06-02 VITALS
DIASTOLIC BLOOD PRESSURE: 76 MMHG | RESPIRATION RATE: 13 BRPM | SYSTOLIC BLOOD PRESSURE: 118 MMHG | WEIGHT: 157.81 LBS | HEART RATE: 68 BPM | BODY MASS INDEX: 29.79 KG/M2 | HEIGHT: 61 IN

## 2021-06-02 DIAGNOSIS — Z87.59 HISTORY OF ECTOPIC PREGNANCY: ICD-10-CM

## 2021-06-02 DIAGNOSIS — Z87.59 HISTORY OF GESTATIONAL HYPERTENSION: ICD-10-CM

## 2021-06-02 DIAGNOSIS — Z31.69 PRE-CONCEPTION COUNSELING: Primary | ICD-10-CM

## 2021-06-02 PROCEDURE — 99999 PR PBB SHADOW E&M-EST. PATIENT-LVL III: ICD-10-PCS | Mod: PBBFAC,,, | Performed by: OBSTETRICS & GYNECOLOGY

## 2021-06-02 PROCEDURE — 99213 PR OFFICE/OUTPT VISIT, EST, LEVL III, 20-29 MIN: ICD-10-PCS | Mod: S$GLB,,, | Performed by: OBSTETRICS & GYNECOLOGY

## 2021-06-02 PROCEDURE — 99999 PR PBB SHADOW E&M-EST. PATIENT-LVL III: CPT | Mod: PBBFAC,,, | Performed by: OBSTETRICS & GYNECOLOGY

## 2021-06-02 PROCEDURE — 99213 OFFICE O/P EST LOW 20 MIN: CPT | Mod: S$GLB,,, | Performed by: OBSTETRICS & GYNECOLOGY

## 2021-12-27 ENCOUNTER — TELEPHONE (OUTPATIENT)
Dept: OBSTETRICS AND GYNECOLOGY | Facility: CLINIC | Age: 31
End: 2021-12-27
Payer: COMMERCIAL

## 2022-01-06 ENCOUNTER — LAB VISIT (OUTPATIENT)
Dept: FAMILY MEDICINE | Facility: CLINIC | Age: 32
End: 2022-01-06
Payer: COMMERCIAL

## 2022-01-06 DIAGNOSIS — Z11.52 ENCOUNTER FOR SCREENING FOR COVID-19: Primary | ICD-10-CM

## 2022-01-06 LAB
CTP QC/QA: YES
SARS-COV-2 AG RESP QL IA.RAPID: NEGATIVE

## 2022-01-06 PROCEDURE — 87811 SARS-COV-2 COVID19 W/OPTIC: CPT | Mod: ,,, | Performed by: INTERNAL MEDICINE

## 2022-01-06 PROCEDURE — 87811 SARS CORONAVIRUS 2 ANTIGEN POCT, MANUAL READ: ICD-10-PCS | Mod: ,,, | Performed by: INTERNAL MEDICINE

## 2022-01-06 NOTE — PROGRESS NOTES
Instructions for Patients with Confirmed or Suspected COVID-19    If you are awaiting your test result, you will either be called or it will be released to the patient portal.  If you have any questions about your test, please visit [email or web address] or call our COVID-19 information line at [phone].      Please isolate yourself at home.  You may leave home and/or return to work once the following conditions are met:    If you have symptoms and tested positive:   More than 5 days since symptoms first appeared AND   More than 24 hours fever free without medications AND       symptoms have improved   · For five days after ending isolation, masks are required.    If you had no symptoms but tested positive:   More than 5 days since the date of the first positive test. If you develop symptoms, then use the guidelines above  · For five days after ending isolation, masks are required.      Testing is not recommended if you are symptom free after completing isolation.

## 2022-01-09 ENCOUNTER — PATIENT MESSAGE (OUTPATIENT)
Dept: ADMINISTRATIVE | Facility: OTHER | Age: 32
End: 2022-01-09
Payer: COMMERCIAL

## 2022-01-18 ENCOUNTER — OFFICE VISIT (OUTPATIENT)
Dept: URGENT CARE | Facility: CLINIC | Age: 32
End: 2022-01-18
Payer: COMMERCIAL

## 2022-01-18 VITALS
WEIGHT: 157 LBS | OXYGEN SATURATION: 99 % | TEMPERATURE: 98 F | HEART RATE: 79 BPM | BODY MASS INDEX: 29.64 KG/M2 | DIASTOLIC BLOOD PRESSURE: 114 MMHG | HEIGHT: 61 IN | RESPIRATION RATE: 16 BRPM | SYSTOLIC BLOOD PRESSURE: 156 MMHG

## 2022-01-18 DIAGNOSIS — Z11.59 SCREENING FOR VIRAL DISEASE: ICD-10-CM

## 2022-01-18 DIAGNOSIS — R05.9 COUGH: ICD-10-CM

## 2022-01-18 DIAGNOSIS — J06.9 ACUTE URI: Primary | ICD-10-CM

## 2022-01-18 DIAGNOSIS — R03.0 ELEVATED BLOOD PRESSURE READING: ICD-10-CM

## 2022-01-18 LAB
CTP QC/QA: YES
POC MOLECULAR INFLUENZA A AGN: NEGATIVE
POC MOLECULAR INFLUENZA B AGN: NEGATIVE

## 2022-01-18 PROCEDURE — 1160F RVW MEDS BY RX/DR IN RCRD: CPT | Mod: CPTII,S$GLB,, | Performed by: NURSE PRACTITIONER

## 2022-01-18 PROCEDURE — 87502 POCT INFLUENZA A/B MOLECULAR: ICD-10-PCS | Mod: QW,S$GLB,, | Performed by: NURSE PRACTITIONER

## 2022-01-18 PROCEDURE — 1159F PR MEDICATION LIST DOCUMENTED IN MEDICAL RECORD: ICD-10-PCS | Mod: CPTII,S$GLB,, | Performed by: NURSE PRACTITIONER

## 2022-01-18 PROCEDURE — 1160F PR REVIEW ALL MEDS BY PRESCRIBER/CLIN PHARMACIST DOCUMENTED: ICD-10-PCS | Mod: CPTII,S$GLB,, | Performed by: NURSE PRACTITIONER

## 2022-01-18 PROCEDURE — 99203 PR OFFICE/OUTPT VISIT, NEW, LEVL III, 30-44 MIN: ICD-10-PCS | Mod: S$GLB,,, | Performed by: NURSE PRACTITIONER

## 2022-01-18 PROCEDURE — 3077F SYST BP >= 140 MM HG: CPT | Mod: CPTII,S$GLB,, | Performed by: NURSE PRACTITIONER

## 2022-01-18 PROCEDURE — 3080F DIAST BP >= 90 MM HG: CPT | Mod: CPTII,S$GLB,, | Performed by: NURSE PRACTITIONER

## 2022-01-18 PROCEDURE — 99203 OFFICE O/P NEW LOW 30 MIN: CPT | Mod: S$GLB,,, | Performed by: NURSE PRACTITIONER

## 2022-01-18 PROCEDURE — 1159F MED LIST DOCD IN RCRD: CPT | Mod: CPTII,S$GLB,, | Performed by: NURSE PRACTITIONER

## 2022-01-18 PROCEDURE — 3077F PR MOST RECENT SYSTOLIC BLOOD PRESSURE >= 140 MM HG: ICD-10-PCS | Mod: CPTII,S$GLB,, | Performed by: NURSE PRACTITIONER

## 2022-01-18 PROCEDURE — 3008F BODY MASS INDEX DOCD: CPT | Mod: CPTII,S$GLB,, | Performed by: NURSE PRACTITIONER

## 2022-01-18 PROCEDURE — 3080F PR MOST RECENT DIASTOLIC BLOOD PRESSURE >= 90 MM HG: ICD-10-PCS | Mod: CPTII,S$GLB,, | Performed by: NURSE PRACTITIONER

## 2022-01-18 PROCEDURE — 3008F PR BODY MASS INDEX (BMI) DOCUMENTED: ICD-10-PCS | Mod: CPTII,S$GLB,, | Performed by: NURSE PRACTITIONER

## 2022-01-18 PROCEDURE — 87502 INFLUENZA DNA AMP PROBE: CPT | Mod: QW,S$GLB,, | Performed by: NURSE PRACTITIONER

## 2022-01-18 RX ORDER — AZITHROMYCIN 250 MG/1
TABLET, FILM COATED ORAL
Qty: 6 TABLET | Refills: 0 | Status: SHIPPED | OUTPATIENT
Start: 2022-01-18 | End: 2022-08-23

## 2022-01-18 RX ORDER — PROMETHAZINE HYDROCHLORIDE AND DEXTROMETHORPHAN HYDROBROMIDE 6.25; 15 MG/5ML; MG/5ML
5 SYRUP ORAL NIGHTLY PRN
Qty: 120 ML | Refills: 0 | Status: SHIPPED | OUTPATIENT
Start: 2022-01-18 | End: 2022-01-21

## 2022-01-18 RX ORDER — PREDNISONE 20 MG/1
20 TABLET ORAL DAILY
Qty: 5 TABLET | Refills: 0 | Status: SHIPPED | OUTPATIENT
Start: 2022-01-18 | End: 2022-01-23

## 2022-01-18 NOTE — PROGRESS NOTES
"Subjective:       Patient ID: Keisha Mena is a 31 y.o. female.    Vitals:  height is 5' 1" (1.549 m) and weight is 71.2 kg (157 lb). Her temperature is 98 °F (36.7 °C). Her blood pressure is 156/114 (abnormal) and her pulse is 79. Her respiration is 16 and oxygen saturation is 99%.     Chief Complaint: Sore Throat    Pt took mucinex this morning.     Sore Throat   This is a new problem. The current episode started in the past 7 days. The problem has been gradually worsening. Neither side of throat is experiencing more pain than the other. There has been no fever. The pain is at a severity of 2/10. The pain is mild. Associated symptoms include headaches. Pertinent negatives include no trouble swallowing. Treatments tried: Mucinex, Tylenol, Ibuprofen. The treatment provided no relief.       Constitution: Positive for chills and fatigue.   HENT: Positive for sore throat. Negative for trouble swallowing.    Neck: neck negative.   Cardiovascular: Negative.    Respiratory: Negative.    Musculoskeletal: Positive for muscle ache.   Neurological: Positive for headaches.       Objective:      Physical Exam   Constitutional: She is oriented to person, place, and time. She appears well-developed and well-nourished. She is cooperative.  Non-toxic appearance. She does not have a sickly appearance. She does not appear ill. No distress.   HENT:   Head: Normocephalic and atraumatic.   Ears:   Right Ear: Hearing, tympanic membrane, external ear and ear canal normal.   Left Ear: Hearing, tympanic membrane, external ear and ear canal normal.   Nose: Nose normal. No mucosal edema, rhinorrhea or nasal deformity. No epistaxis. Right sinus exhibits no maxillary sinus tenderness and no frontal sinus tenderness. Left sinus exhibits no maxillary sinus tenderness and no frontal sinus tenderness.   Mouth/Throat: Uvula is midline, oropharynx is clear and moist and mucous membranes are normal. No trismus in the jaw. Normal dentition. No uvula " swelling. No oropharyngeal exudate, posterior oropharyngeal edema or posterior oropharyngeal erythema.   Eyes: Conjunctivae and lids are normal. No scleral icterus.   Neck: Trachea normal and phonation normal. Neck supple. No edema present. No erythema present. No neck rigidity present.   Cardiovascular: Normal rate, regular rhythm, normal heart sounds, intact distal pulses and normal pulses.   Pulmonary/Chest: Effort normal and breath sounds normal. No stridor. No respiratory distress. She has no decreased breath sounds. She has no wheezes. She has no rhonchi. She has no rales. She exhibits no tenderness.   Abdominal: Normal appearance.   Musculoskeletal: Normal range of motion.         General: No deformity or edema. Normal range of motion.   Neurological: She is alert and oriented to person, place, and time. She exhibits normal muscle tone. Coordination normal.   Skin: Skin is warm, dry, intact, not diaphoretic and not pale.   Psychiatric: She has a normal mood and affect. Her speech is normal and behavior is normal. Judgment and thought content normal. Cognition and memory  Nursing note and vitals reviewed.        Assessment:       1. Acute URI    2. Cough    3. Screening for viral disease    4. Elevated blood pressure reading          Plan:         Acute URI  -     azithromycin (ZITHROMAX) 250 MG tablet; Take 2 tablets (500 mg) on  Day 1,  followed by 1 tablet (250 mg) once daily on Days 2 through 5.  Dispense: 6 tablet; Refill: 0  -     predniSONE (DELTASONE) 20 MG tablet; Take 1 tablet (20 mg total) by mouth once daily. for 5 days  Dispense: 5 tablet; Refill: 0    Cough  -     promethazine-dextromethorphan (PROMETHAZINE-DM) 6.25-15 mg/5 mL Syrp; Take 5 mLs by mouth nightly as needed (cough).  Dispense: 120 mL; Refill: 0    Screening for viral disease  -     POCT Influenza A/B MOLECULAR    Elevated blood pressure reading    Discussed with patient that blood pressure today was above 120/80 and that illness,  anxiety or pain can cause a temporary rise in blood pressure and later returns to normal. Instructed to have blood pressure measured again within the next few days and to follow up with PCP for further evaluation if BP continues to be elevated .    Patient Instructions   The following are suggestions to help you with upper respiratory symptoms.    Congestion:    Nasal Saline  Nasal saline is available over the counter. There are several different commercial preparations such as Ocean spray and Ayr spray. There is no limit on the use of Nasal saline. Saline is used by snorting the mist up into the nose then later gently blowing the nose to get rid of any secretions that it has loosened.     Mucous Thinners and Decongestants  Mucous thinners and decongestants are used to shrink down the tissues and promote sinus drainage. There are multiple prescription and over-the-counter medications available. A mucous thinner will tend to be drying unless you are also drinking plenty of water when taking these. If you have high blood pressure, it is very important to monitor your pressure while on decongestants. The mucous thinner/decongestant combinations are typically given twice per day. However, some people will be unable to tolerate these at night and should only take them once per day.    Decongestant Nasal Sprays  Over-the-counter decongestant nasal spray such as Afrin, may be helpful as an initial step in treating upper respiratory infections. This spray can be used for up to approximately 3 days and is used no more than twice per day. Topical nasal decongestant spray for longer than 5 days will result in a physical addiction, in which the nasal lining will become significantly swollen and irritated until the spray is used again. May cause elevated blood pressure    Use pseudoephedrine (behind the counter) for sinus pressure and congestion- Pseudoephedrine 30 mg up to 240 mg /day. Warning:  It can raise your blood  pressure and give you palpitations, avoid with history of high blood pressure, palpitations, and severe cardiac disease.  Coricidin HBP is okay to use if you have high blood pressure.     Nasal Steroids  Nasal steroid medications such as Flonase are useful for upper respiratory infections, allergies, and sensitivities to airborne irritants. Unfortunately, they do not begin to work for 1-2 days, and they do not reach their maximum benefit for approximately 2-3 weeks. Initial therapy is typically 2 puffs per nostril twice per day. This should be used for only a few days, then the maintenance dosage is one or two puffs per nostril once per day. This can be done at any time of the day. The most effective way to use any nasal medication is to look down at your toes when spraying it in. Aim slightly away from the septum (dividing plate between the nostrils), and gently inhale. This ensures that the spray will go into the sinus cavities and not straight up into the nose. A good way to avoid spraying onto the septum is to use the right hand to spray into the left nostril, and vice versa for the right nostril. Occasionally, nasal steroids can increase the risk of nosebleeds, but in general they are very well tolerated. If you develop a bloody nose, stop using the medication immediately.    Clear Runny Nose/Allergic Rhinitis:  Use an antihistamine to help dry you out.   Antihistamines  Antihistamines are available both over-the-counter and as a prescription. There are also various decongestant and antihistamine combinations available such as Claritin, Allegra, and Zyrtec. It is best to take any antihistamine-decongestant combination in the morning to avoid insomnia. Zyrtec should probably be taken at night, in order to reduce the chance of sleepiness during the daytime. If there is a significant infection present and secretions are already thickened, it is recommended to discontinue antihistamines and use a mucous  thinner/decongestant combination.      Oral Steroids  Oral steroids can be used with more sever infections. Often, they are the only medications that will reduce the symptoms of pressure and allow the nasal sinuses to drain. These are best taken on a full stomach and earlier in the day is better. They may give you some irritability, stomach upset, or hyperactivity. This can also interfere with sleep.     A person who has high blood pressure or diabetes should be very careful to monitor their blood pressure or blood glucose while taking steroids. Steroids can have multiple side effects especially when taken long-term. Short-term doses are usually very well tolerated and extremely effective in controlling the symptoms associated with acute and chronic sinus infections and severe allergies. The use of steroids for greater than approximately seven days requires a tapering down in order to discontinue them. You should not abruptly stop your steroid if you have been taking the same dose for greater than one week.    Antibiotic Treatment  Finally, when all of these other measures have failed, and a bacterial infection is present, an antibiotic will be prescribed. The most common symptoms of acute sinusitis of a bacterial nature are pain, pressure, and thick and colored nasal drainage. However, not all colored drainage means that there is a bacterial infection present. According to the Center for Disease Control, only 2% of colds will progress to result in bacterial sinusitis. Most upper respiratory infections should NOT be treated with antibiotics. Antibiotics should be reserved for upper respiratory infections which last longer than 10 days, or which worsen after 5 to 7 days of treatment. The use of antibiotics for nonbacterial upper respiratory infections has resulted in a severe problem with the emergence of bacteria which are resistant to multiple forms of antibiotics, and some bacteria are currently only treatable  with intravenous antibiotics.    Body Aches/Pains/Fever  For patients who are not allergic to and are not on anticoagulants, you can alternate Tylenol every 4 hours and Motrin every 6 hours for fever above 100.4F and/or pain.  For patients who are allergic or intolerant to NSAIDS, have gastritis, gastric ulcers, or history of GI bleeds, are pregnant, or are on anticoagulant therapy, you can take Tylenol every 4 hours as needed for fever above 100.4F and/or pain.     Maintain adequate hydration -  Rest and keep yourself/patient well hydrated. For adults, it is recommended to drink at least 8 glasses of water daily.  This may help thin secretions and soothe the respiratory mucosa.     Sore Throat  Perform warm, salt water gargles (1/2 tsp salt to 1 cup warm water) to help reduce inflammation and throat discomfort. Chloraseptic sprays and throat lozenges will also help with your throat pain.    COUGH  A viral cough may linger for 3 to 4 weeks but should steadily improve over time. Coughing is the body's natural way to clear mucus and help get rid of bacteria and viruses. Therefore, cough suppressants are usually not recommended.      Use mucinex (guaifenisin) up to 2400mg/day to break up/loosen any mucous.     Common cough suppressants include the ingredient dextromethorphan or DM, (such as Mucinex DM) available over-the-counter and can be used for cough to stop the tickle in the back of your throat.      ? Honey may be beneficial, especially on nocturnal cough 1 to 2 teaspoons can be taken straight or diluted in tea, juice or other liquid.    The antioxidants in honey are an important contributor to its decongestant properties. Generally speaking, darker honey contains more antioxidants. Buckwheat and avocado honey are particularly good choices. If these honeys are not available in your area, choose the darkest honey you can find.    Take all medications as directed. If you have been prescribed antibiotics, make sure  to complete them.     If your condition fails to improve in a timely manner, you should receive another evaluation by your Primary Care Provider to discuss your concerns or return to urgent care for a recheck.      **You must understand that you have received Urgent Care treatment only and that you may be released before all of your medical problems are known or treated. You, the patient, are responsible to arrange for follow-up care as instructed. If your condition worsens at any time, you should report immediately to your nearest Emergency Department for further evaluation.

## 2022-08-20 ENCOUNTER — PATIENT MESSAGE (OUTPATIENT)
Dept: OBSTETRICS AND GYNECOLOGY | Facility: CLINIC | Age: 32
End: 2022-08-20
Payer: COMMERCIAL

## 2022-08-23 ENCOUNTER — LAB VISIT (OUTPATIENT)
Dept: LAB | Facility: HOSPITAL | Age: 32
End: 2022-08-23
Attending: NURSE PRACTITIONER
Payer: COMMERCIAL

## 2022-08-23 ENCOUNTER — OFFICE VISIT (OUTPATIENT)
Dept: INTERNAL MEDICINE | Facility: CLINIC | Age: 32
End: 2022-08-23
Payer: COMMERCIAL

## 2022-08-23 VITALS
HEART RATE: 109 BPM | RESPIRATION RATE: 19 BRPM | BODY MASS INDEX: 32.34 KG/M2 | OXYGEN SATURATION: 99 % | WEIGHT: 171.31 LBS | SYSTOLIC BLOOD PRESSURE: 132 MMHG | DIASTOLIC BLOOD PRESSURE: 94 MMHG | HEIGHT: 61 IN

## 2022-08-23 DIAGNOSIS — Z87.59 HISTORY OF ECTOPIC PREGNANCY: ICD-10-CM

## 2022-08-23 DIAGNOSIS — I10 HYPERTENSION, UNSPECIFIED TYPE: ICD-10-CM

## 2022-08-23 DIAGNOSIS — J06.9 UPPER RESPIRATORY TRACT INFECTION, UNSPECIFIED TYPE: ICD-10-CM

## 2022-08-23 DIAGNOSIS — Z13.220 SCREENING FOR HYPERLIPIDEMIA: ICD-10-CM

## 2022-08-23 DIAGNOSIS — Z13.29 SCREENING FOR HYPOTHYROIDISM: Primary | ICD-10-CM

## 2022-08-23 DIAGNOSIS — Z00.00 HEALTHCARE MAINTENANCE: ICD-10-CM

## 2022-08-23 DIAGNOSIS — Z13.29 SCREENING FOR HYPOTHYROIDISM: ICD-10-CM

## 2022-08-23 LAB
ALBUMIN SERPL BCP-MCNC: 4.1 G/DL (ref 3.5–5.2)
ALP SERPL-CCNC: 63 U/L (ref 55–135)
ALT SERPL W/O P-5'-P-CCNC: 9 U/L (ref 10–44)
ANION GAP SERPL CALC-SCNC: 9 MMOL/L (ref 8–16)
AST SERPL-CCNC: 18 U/L (ref 10–40)
BASOPHILS # BLD AUTO: 0.02 K/UL (ref 0–0.2)
BASOPHILS NFR BLD: 0.4 % (ref 0–1.9)
BILIRUB SERPL-MCNC: 0.3 MG/DL (ref 0.1–1)
BUN SERPL-MCNC: 13 MG/DL (ref 6–20)
CALCIUM SERPL-MCNC: 9.1 MG/DL (ref 8.7–10.5)
CHLORIDE SERPL-SCNC: 107 MMOL/L (ref 95–110)
CHOLEST SERPL-MCNC: 185 MG/DL (ref 120–199)
CHOLEST/HDLC SERPL: 5 {RATIO} (ref 2–5)
CO2 SERPL-SCNC: 22 MMOL/L (ref 23–29)
CREAT SERPL-MCNC: 0.9 MG/DL (ref 0.5–1.4)
DIFFERENTIAL METHOD: ABNORMAL
EOSINOPHIL # BLD AUTO: 0.1 K/UL (ref 0–0.5)
EOSINOPHIL NFR BLD: 2.4 % (ref 0–8)
ERYTHROCYTE [DISTWIDTH] IN BLOOD BY AUTOMATED COUNT: 12.3 % (ref 11.5–14.5)
EST. GFR  (NO RACE VARIABLE): >60 ML/MIN/1.73 M^2
GLUCOSE SERPL-MCNC: 94 MG/DL (ref 70–110)
HCG INTACT+B SERPL-ACNC: <1.2 MIU/ML
HCT VFR BLD AUTO: 39.7 % (ref 37–48.5)
HDLC SERPL-MCNC: 37 MG/DL (ref 40–75)
HDLC SERPL: 20 % (ref 20–50)
HGB BLD-MCNC: 13.9 G/DL (ref 12–16)
IMM GRANULOCYTES # BLD AUTO: 0.01 K/UL (ref 0–0.04)
IMM GRANULOCYTES NFR BLD AUTO: 0.2 % (ref 0–0.5)
LDLC SERPL CALC-MCNC: 94.2 MG/DL (ref 63–159)
LYMPHOCYTES # BLD AUTO: 2 K/UL (ref 1–4.8)
LYMPHOCYTES NFR BLD: 39.6 % (ref 18–48)
MCH RBC QN AUTO: 32.8 PG (ref 27–31)
MCHC RBC AUTO-ENTMCNC: 35 G/DL (ref 32–36)
MCV RBC AUTO: 94 FL (ref 82–98)
MONOCYTES # BLD AUTO: 0.4 K/UL (ref 0.3–1)
MONOCYTES NFR BLD: 8 % (ref 4–15)
NEUTROPHILS # BLD AUTO: 2.5 K/UL (ref 1.8–7.7)
NEUTROPHILS NFR BLD: 49.4 % (ref 38–73)
NONHDLC SERPL-MCNC: 148 MG/DL
NRBC BLD-RTO: 0 /100 WBC
PLATELET # BLD AUTO: 177 K/UL (ref 150–450)
PMV BLD AUTO: 10.5 FL (ref 9.2–12.9)
POTASSIUM SERPL-SCNC: 3.9 MMOL/L (ref 3.5–5.1)
PROT SERPL-MCNC: 7.7 G/DL (ref 6–8.4)
RBC # BLD AUTO: 4.24 M/UL (ref 4–5.4)
SODIUM SERPL-SCNC: 138 MMOL/L (ref 136–145)
TRIGL SERPL-MCNC: 269 MG/DL (ref 30–150)
TSH SERPL DL<=0.005 MIU/L-ACNC: 1.6 UIU/ML (ref 0.4–4)
WBC # BLD AUTO: 5 K/UL (ref 3.9–12.7)

## 2022-08-23 PROCEDURE — 36415 COLL VENOUS BLD VENIPUNCTURE: CPT | Performed by: NURSE PRACTITIONER

## 2022-08-23 PROCEDURE — 3008F PR BODY MASS INDEX (BMI) DOCUMENTED: ICD-10-PCS | Mod: CPTII,S$GLB,, | Performed by: NURSE PRACTITIONER

## 2022-08-23 PROCEDURE — 1160F PR REVIEW ALL MEDS BY PRESCRIBER/CLIN PHARMACIST DOCUMENTED: ICD-10-PCS | Mod: CPTII,S$GLB,, | Performed by: NURSE PRACTITIONER

## 2022-08-23 PROCEDURE — 99214 PR OFFICE/OUTPT VISIT, EST, LEVL IV, 30-39 MIN: ICD-10-PCS | Mod: 25,S$GLB,, | Performed by: NURSE PRACTITIONER

## 2022-08-23 PROCEDURE — 84702 CHORIONIC GONADOTROPIN TEST: CPT | Performed by: NURSE PRACTITIONER

## 2022-08-23 PROCEDURE — 99214 OFFICE O/P EST MOD 30 MIN: CPT | Mod: 25,S$GLB,, | Performed by: NURSE PRACTITIONER

## 2022-08-23 PROCEDURE — 99999 PR PBB SHADOW E&M-EST. PATIENT-LVL III: CPT | Mod: PBBFAC,,, | Performed by: NURSE PRACTITIONER

## 2022-08-23 PROCEDURE — 80061 LIPID PANEL: CPT | Performed by: NURSE PRACTITIONER

## 2022-08-23 PROCEDURE — 1160F RVW MEDS BY RX/DR IN RCRD: CPT | Mod: CPTII,S$GLB,, | Performed by: NURSE PRACTITIONER

## 2022-08-23 PROCEDURE — 1159F PR MEDICATION LIST DOCUMENTED IN MEDICAL RECORD: ICD-10-PCS | Mod: CPTII,S$GLB,, | Performed by: NURSE PRACTITIONER

## 2022-08-23 PROCEDURE — 80053 COMPREHEN METABOLIC PANEL: CPT | Performed by: NURSE PRACTITIONER

## 2022-08-23 PROCEDURE — 3075F PR MOST RECENT SYSTOLIC BLOOD PRESS GE 130-139MM HG: ICD-10-PCS | Mod: CPTII,S$GLB,, | Performed by: NURSE PRACTITIONER

## 2022-08-23 PROCEDURE — 3008F BODY MASS INDEX DOCD: CPT | Mod: CPTII,S$GLB,, | Performed by: NURSE PRACTITIONER

## 2022-08-23 PROCEDURE — 84443 ASSAY THYROID STIM HORMONE: CPT | Performed by: NURSE PRACTITIONER

## 2022-08-23 PROCEDURE — 1159F MED LIST DOCD IN RCRD: CPT | Mod: CPTII,S$GLB,, | Performed by: NURSE PRACTITIONER

## 2022-08-23 PROCEDURE — 3080F DIAST BP >= 90 MM HG: CPT | Mod: CPTII,S$GLB,, | Performed by: NURSE PRACTITIONER

## 2022-08-23 PROCEDURE — 85025 COMPLETE CBC W/AUTO DIFF WBC: CPT | Performed by: NURSE PRACTITIONER

## 2022-08-23 PROCEDURE — 3080F PR MOST RECENT DIASTOLIC BLOOD PRESSURE >= 90 MM HG: ICD-10-PCS | Mod: CPTII,S$GLB,, | Performed by: NURSE PRACTITIONER

## 2022-08-23 PROCEDURE — 96372 THER/PROPH/DIAG INJ SC/IM: CPT | Mod: S$GLB,,, | Performed by: NURSE PRACTITIONER

## 2022-08-23 PROCEDURE — 99999 PR PBB SHADOW E&M-EST. PATIENT-LVL III: ICD-10-PCS | Mod: PBBFAC,,, | Performed by: NURSE PRACTITIONER

## 2022-08-23 PROCEDURE — 96372 PR INJECTION,THERAP/PROPH/DIAG2ST, IM OR SUBCUT: ICD-10-PCS | Mod: S$GLB,,, | Performed by: NURSE PRACTITIONER

## 2022-08-23 PROCEDURE — 3075F SYST BP GE 130 - 139MM HG: CPT | Mod: CPTII,S$GLB,, | Performed by: NURSE PRACTITIONER

## 2022-08-23 RX ORDER — METHYLPREDNISOLONE ACETATE 40 MG/ML
40 INJECTION, SUSPENSION INTRA-ARTICULAR; INTRALESIONAL; INTRAMUSCULAR; SOFT TISSUE
Status: COMPLETED | OUTPATIENT
Start: 2022-08-23 | End: 2022-08-23

## 2022-08-23 RX ORDER — LABETALOL 100 MG/1
100 TABLET, FILM COATED ORAL 2 TIMES DAILY
Qty: 60 TABLET | Refills: 1 | Status: SHIPPED | OUTPATIENT
Start: 2022-08-23 | End: 2022-09-14

## 2022-08-23 RX ADMIN — METHYLPREDNISOLONE ACETATE 40 MG: 40 INJECTION, SUSPENSION INTRA-ARTICULAR; INTRALESIONAL; INTRAMUSCULAR; SOFT TISSUE at 05:08

## 2022-08-23 NOTE — PROGRESS NOTES
Subjective:       Patient ID: Keisha Mena is a 31 y.o. female.    Chief Complaint: Sore Throat    HPI: Pt presents to clinic today known to Dr Garcia with c/o sore throat for a couple weeks. Both son and  have covid currently but her test have been negative . She also started with the throat prior to them. Now has some taste in mouth. Feels like drainage. NO fever. NO trouble swallowing. Feels like throat is dry. She taste like she would lhave lyths. She also  needs just a general check up has f/u with GYN f/u scheduled     bp elevated- has been on procardia as well as metoprolol. Usually with pregnancy- still interested in pregnancy after tubal last year     Slightly had some diarrhea last week     Cycle started this week.   Review of Systems   Constitutional: Negative for chills, fatigue, fever and unexpected weight change.   HENT: Negative for congestion, ear pain, sore throat and trouble swallowing.    Eyes: Negative for pain and visual disturbance.   Respiratory: Negative for cough, chest tightness and shortness of breath.    Cardiovascular: Negative for chest pain, palpitations and leg swelling.   Gastrointestinal: Negative for abdominal distention, abdominal pain, constipation, diarrhea and vomiting.   Genitourinary: Negative for difficulty urinating, dysuria, flank pain, frequency and hematuria.   Musculoskeletal: Negative for back pain, gait problem, joint swelling, neck pain and neck stiffness.   Skin: Negative for rash and wound.   Neurological: Negative for dizziness, seizures, speech difficulty, light-headedness and headaches.       Objective:      Physical Exam  Vitals and nursing note reviewed.   Constitutional:       Appearance: She is well-developed.   HENT:      Head: Normocephalic and atraumatic.      Right Ear: External ear normal.      Left Ear: External ear normal.   Eyes:      Conjunctiva/sclera: Conjunctivae normal.      Pupils: Pupils are equal, round, and reactive to light.   Neck:       Thyroid: No thyromegaly.      Vascular: No JVD.      Trachea: No tracheal deviation.   Cardiovascular:      Rate and Rhythm: Normal rate and regular rhythm.      Heart sounds: Normal heart sounds.   Pulmonary:      Effort: Pulmonary effort is normal. No respiratory distress.      Breath sounds: Normal breath sounds. No wheezing or rales.   Chest:      Chest wall: No tenderness.   Abdominal:      General: Bowel sounds are normal. There is no distension.      Palpations: Abdomen is soft. There is no mass.      Tenderness: There is no abdominal tenderness. There is no guarding or rebound.   Musculoskeletal:         General: Normal range of motion.      Cervical back: Normal range of motion and neck supple.   Lymphadenopathy:      Cervical: No cervical adenopathy.   Skin:     General: Skin is warm and dry.   Neurological:      Mental Status: She is alert and oriented to person, place, and time.      Cranial Nerves: No cranial nerve deficit.      Motor: No abnormal muscle tone.      Coordination: Coordination normal.      Deep Tendon Reflexes: Reflexes are normal and symmetric. Reflexes normal.         Assessment:       1. Screening for hypothyroidism    2. Screening for hyperlipidemia    3. Healthcare maintenance    4. Hypertension, unspecified type    5. Upper respiratory tract infection, unspecified type    6. History of ectopic pregnancy        Plan:     Problem List Items Addressed This Visit    None     Visit Diagnoses     Screening for hypothyroidism    -  Primary    Relevant Orders    TSH    Screening for hyperlipidemia        Relevant Orders    Lipid Panel    Healthcare maintenance        Relevant Orders    CBC Auto Differential    Comprehensive Metabolic Panel    Hypertension, unspecified type        Relevant Medications    labetaloL (NORMODYNE) 100 MG tablet    Upper respiratory tract infection, unspecified type        Relevant Medications    methylPREDNISolone acetate injection 40 mg (Start on 8/23/2022   3:30 PM)    History of ectopic pregnancy        Relevant Orders    HCG, QUANTITATIVE, PREGNANCY          rtc Tuesday for bp check  Labs today had a smoothie  Will return for medrol once HCG resulted

## 2022-08-30 ENCOUNTER — OFFICE VISIT (OUTPATIENT)
Dept: OBSTETRICS AND GYNECOLOGY | Facility: CLINIC | Age: 32
End: 2022-08-30
Payer: COMMERCIAL

## 2022-08-30 VITALS
WEIGHT: 167.81 LBS | HEIGHT: 61 IN | HEART RATE: 71 BPM | DIASTOLIC BLOOD PRESSURE: 82 MMHG | BODY MASS INDEX: 31.68 KG/M2 | RESPIRATION RATE: 16 BRPM | SYSTOLIC BLOOD PRESSURE: 126 MMHG

## 2022-08-30 DIAGNOSIS — Z01.419 WELL WOMAN EXAM WITH ROUTINE GYNECOLOGICAL EXAM: Primary | ICD-10-CM

## 2022-08-30 PROCEDURE — 1159F MED LIST DOCD IN RCRD: CPT | Mod: CPTII,S$GLB,, | Performed by: OBSTETRICS & GYNECOLOGY

## 2022-08-30 PROCEDURE — 99999 PR PBB SHADOW E&M-EST. PATIENT-LVL III: ICD-10-PCS | Mod: PBBFAC,,, | Performed by: OBSTETRICS & GYNECOLOGY

## 2022-08-30 PROCEDURE — 1160F RVW MEDS BY RX/DR IN RCRD: CPT | Mod: CPTII,S$GLB,, | Performed by: OBSTETRICS & GYNECOLOGY

## 2022-08-30 PROCEDURE — 3079F DIAST BP 80-89 MM HG: CPT | Mod: CPTII,S$GLB,, | Performed by: OBSTETRICS & GYNECOLOGY

## 2022-08-30 PROCEDURE — 3079F PR MOST RECENT DIASTOLIC BLOOD PRESSURE 80-89 MM HG: ICD-10-PCS | Mod: CPTII,S$GLB,, | Performed by: OBSTETRICS & GYNECOLOGY

## 2022-08-30 PROCEDURE — 3008F PR BODY MASS INDEX (BMI) DOCUMENTED: ICD-10-PCS | Mod: CPTII,S$GLB,, | Performed by: OBSTETRICS & GYNECOLOGY

## 2022-08-30 PROCEDURE — 99999 PR PBB SHADOW E&M-EST. PATIENT-LVL III: CPT | Mod: PBBFAC,,, | Performed by: OBSTETRICS & GYNECOLOGY

## 2022-08-30 PROCEDURE — 3074F SYST BP LT 130 MM HG: CPT | Mod: CPTII,S$GLB,, | Performed by: OBSTETRICS & GYNECOLOGY

## 2022-08-30 PROCEDURE — 1160F PR REVIEW ALL MEDS BY PRESCRIBER/CLIN PHARMACIST DOCUMENTED: ICD-10-PCS | Mod: CPTII,S$GLB,, | Performed by: OBSTETRICS & GYNECOLOGY

## 2022-08-30 PROCEDURE — 3074F PR MOST RECENT SYSTOLIC BLOOD PRESSURE < 130 MM HG: ICD-10-PCS | Mod: CPTII,S$GLB,, | Performed by: OBSTETRICS & GYNECOLOGY

## 2022-08-30 PROCEDURE — 99395 PREV VISIT EST AGE 18-39: CPT | Mod: S$GLB,,, | Performed by: OBSTETRICS & GYNECOLOGY

## 2022-08-30 PROCEDURE — 3008F BODY MASS INDEX DOCD: CPT | Mod: CPTII,S$GLB,, | Performed by: OBSTETRICS & GYNECOLOGY

## 2022-08-30 PROCEDURE — 99395 PR PREVENTIVE VISIT,EST,18-39: ICD-10-PCS | Mod: S$GLB,,, | Performed by: OBSTETRICS & GYNECOLOGY

## 2022-08-30 PROCEDURE — 1159F PR MEDICATION LIST DOCUMENTED IN MEDICAL RECORD: ICD-10-PCS | Mod: CPTII,S$GLB,, | Performed by: OBSTETRICS & GYNECOLOGY

## 2022-08-30 NOTE — PROGRESS NOTES
Subjective:    Patient ID: Keisha Mena is a 31 y.o. y.o. female.     Chief Complaint: Annual Well Woman Exam     History of Present Illness:  Keisha presents today for Annual Well Woman exam. She describes her menses as regular every month without intermenstrual spotting.  She denies pelvic pain.  She denies breast tenderness, masses, nipple discharge. She denies GYN complaints. She denies difficulty with urination or bowel movements.  She denies bloating, early satiety, or weight changes. She is sexually active. Contraception is by no method.      Menstrual History:   Patient's last menstrual period was 2022..     OB History    : 2  Para: 1  Term: 1  : 0  : 1  Livin  Spontaneous : 0  Induced : 0  Ectopic: 1  Multiple: 0  Live Births: 1            The following portions of the patient's history were reviewed and updated as appropriate: allergies, current medications, past family history, past medical history, past social history, past surgical history, and problem list.    ROS:   CONSTITUTIONAL: Negative for fever, chills, diaphoresis, weakness, fatigue, weight loss, weight gain  ENT: negative for sore throat, nasal congestion, nasal discharge, epistaxis, tinnitus, hearing loss  EYES: negative for blurry vision, decreased vision, loss of vision, eye pain, diplopia, photophobia, discharge  SKIN: Negative for rash, itching, hives  RESPIRATORY: negative for cough, hemoptysis, shortness of breath, pleuritic chest pain, wheezing  CARDIOVASCULAR: negative for chest pain, dyspnea on exertion, orthopnea, paroxysmal nocturnal dyspnea, edema, palpitations  BREAST: negative for breast  tenderness, breast mass, nipple discharge, or skin changes  GASTROINTESTINAL: negative for abdominal pain, flank pain, nausea, vomiting, diarrhea, constipation, black stool, blood in stool  GENITOURINARY: pain with intercourse, negative for abnormal vaginal bleeding, amenorrhea, decreased libido,  dysuria, genital sores, hematuria, incontinence, menorrhagia, pelvic pain, urinary frequency, vaginal discharge  HEMATOLOGIC/LYMPHATIC: negative for swollen lymph nodes, bleeding, bruising  MUSCULOSKELETAL: negative for back pain, joint pain, joint stiffness, joint swelling, muscle pain, muscle weakness  NEUROLOGICAL: negative for dizzy/vertigo, headache, focal weakness, numbness/tingling, speech problems, loss of consciousness, confusion, memory loss  BEHAVORIAL/PSYCH: negative for anxiety, depression, psychosis  ENDOCRINE: negative for polydipsia/polyuria, palpitations, skin changes, temperature intolerance, unexpected weight changes  ALLERGIC/IMMUNOLOGIC: negative for urticaria, hay fever, angioedema      Objective:    Vital Signs:  Vitals:    08/30/22 1120   BP: 126/82   Pulse: 71   Resp: 16       Physical Exam:  General:  alert, cooperative, no distress   Skin:  Skin color, texture, turgor normal. No rashes or lesions   HEENT:  extra ocular movement intact, sclera clear, anicteric   Neck: supple, trachea midline, no adenopathy or thyromegally   Respiratory:  Normal effort   Breasts:  no discharge, erythema, tenderness, or palpable masses; no axillary lymphadenopathy   Abdomen:  soft, nontender, no palpable masses   Pelvis: External genitalia: normal general appearance  Urinary system: urethral meatus normal, bladder nontender  Vaginal: normal mucosa without prolapse or lesions  Cervix: normal appearance  Uterus: normal size, shape, position  Adnexa: normal size, nontender bilaterally   Extremities: Normal ROM; no edema, no cyanosis   Neurologial: Normal strength and tone. No focal numbness or weakness.   Psychiatric: normal mood, speech, dress, and thought processes         Assessment:       Healthy female exam.     1. Well woman exam with routine gynecological exam          Plan:      Well woman exam with routine gynecological exam      Pap 2018 NEM with negative HPV    COUNSELING:  Keisha was counseled on   NAHUM. Pap guidelines and recommendations for yearly pelvic exams in addition to recommendations for monthly self breast exams; to see her PCP for other health maintenance.

## 2022-09-28 ENCOUNTER — TELEPHONE (OUTPATIENT)
Dept: INTERNAL MEDICINE | Facility: CLINIC | Age: 32
End: 2022-09-28

## 2022-09-28 ENCOUNTER — OFFICE VISIT (OUTPATIENT)
Dept: INTERNAL MEDICINE | Facility: CLINIC | Age: 32
End: 2022-09-28
Payer: COMMERCIAL

## 2022-09-28 VITALS
DIASTOLIC BLOOD PRESSURE: 78 MMHG | SYSTOLIC BLOOD PRESSURE: 118 MMHG | BODY MASS INDEX: 32.1 KG/M2 | HEART RATE: 90 BPM | RESPIRATION RATE: 16 BRPM | OXYGEN SATURATION: 99 % | WEIGHT: 170 LBS | HEIGHT: 61 IN

## 2022-09-28 DIAGNOSIS — M54.12 CERVICAL RADICULOPATHY: Primary | ICD-10-CM

## 2022-09-28 DIAGNOSIS — E66.09 CLASS 1 OBESITY DUE TO EXCESS CALORIES WITH SERIOUS COMORBIDITY AND BODY MASS INDEX (BMI) OF 32.0 TO 32.9 IN ADULT: ICD-10-CM

## 2022-09-28 PROBLEM — E66.811 CLASS 1 OBESITY DUE TO EXCESS CALORIES WITH SERIOUS COMORBIDITY AND BODY MASS INDEX (BMI) OF 32.0 TO 32.9 IN ADULT: Status: ACTIVE | Noted: 2022-09-28

## 2022-09-28 PROCEDURE — 1160F RVW MEDS BY RX/DR IN RCRD: CPT | Mod: CPTII,S$GLB,, | Performed by: INTERNAL MEDICINE

## 2022-09-28 PROCEDURE — 1159F MED LIST DOCD IN RCRD: CPT | Mod: CPTII,S$GLB,, | Performed by: INTERNAL MEDICINE

## 2022-09-28 PROCEDURE — 1160F PR REVIEW ALL MEDS BY PRESCRIBER/CLIN PHARMACIST DOCUMENTED: ICD-10-PCS | Mod: CPTII,S$GLB,, | Performed by: INTERNAL MEDICINE

## 2022-09-28 PROCEDURE — 3074F SYST BP LT 130 MM HG: CPT | Mod: CPTII,S$GLB,, | Performed by: INTERNAL MEDICINE

## 2022-09-28 PROCEDURE — 3008F PR BODY MASS INDEX (BMI) DOCUMENTED: ICD-10-PCS | Mod: CPTII,S$GLB,, | Performed by: INTERNAL MEDICINE

## 2022-09-28 PROCEDURE — 99213 OFFICE O/P EST LOW 20 MIN: CPT | Mod: S$GLB,,, | Performed by: INTERNAL MEDICINE

## 2022-09-28 PROCEDURE — 3074F PR MOST RECENT SYSTOLIC BLOOD PRESSURE < 130 MM HG: ICD-10-PCS | Mod: CPTII,S$GLB,, | Performed by: INTERNAL MEDICINE

## 2022-09-28 PROCEDURE — 3078F DIAST BP <80 MM HG: CPT | Mod: CPTII,S$GLB,, | Performed by: INTERNAL MEDICINE

## 2022-09-28 PROCEDURE — 99999 PR PBB SHADOW E&M-EST. PATIENT-LVL IV: ICD-10-PCS | Mod: PBBFAC,,, | Performed by: INTERNAL MEDICINE

## 2022-09-28 PROCEDURE — 99213 PR OFFICE/OUTPT VISIT, EST, LEVL III, 20-29 MIN: ICD-10-PCS | Mod: S$GLB,,, | Performed by: INTERNAL MEDICINE

## 2022-09-28 PROCEDURE — 99999 PR PBB SHADOW E&M-EST. PATIENT-LVL IV: CPT | Mod: PBBFAC,,, | Performed by: INTERNAL MEDICINE

## 2022-09-28 PROCEDURE — 3008F BODY MASS INDEX DOCD: CPT | Mod: CPTII,S$GLB,, | Performed by: INTERNAL MEDICINE

## 2022-09-28 PROCEDURE — 3078F PR MOST RECENT DIASTOLIC BLOOD PRESSURE < 80 MM HG: ICD-10-PCS | Mod: CPTII,S$GLB,, | Performed by: INTERNAL MEDICINE

## 2022-09-28 PROCEDURE — 1159F PR MEDICATION LIST DOCUMENTED IN MEDICAL RECORD: ICD-10-PCS | Mod: CPTII,S$GLB,, | Performed by: INTERNAL MEDICINE

## 2022-09-28 NOTE — PROGRESS NOTES
"Subjective:       Patient ID: Keisha Mena is a 31 y.o. female.    Chief Complaint: Neck Pain, Arm Pain (Bilateral arm numbness), and Hip Pain (Left hip, out of line)      HPI:  Patient is known to me and presents with numbness in b/l arms/hands. Sx started 3 months ago and slowly progression. Some days are worse then others. Wakes up in AM and feeling like "there are rubber bands on my arms". Sx are throughout the entire arm and affecting all 5 fingers. Occurring mostly at night/AM but also occurs with driving, if arms up up. She does have some neck pain. Worse with looking down or looking side to side. Has tried to do stretching and improving posture without relief of sx. No recent falls or injuries.     She has h/o sciatica and had great relief with PT in the past. She went to a wedding and feels sh eis having exacerbation.     Past Medical History:   Diagnosis Date    Anxiety     Female bladder prolapse     Hypertension        Family History   Problem Relation Age of Onset    Hypertension Mother     Alcohol abuse Father         history of s/p liver transplant    Breast cancer Neg Hx     Colon cancer Neg Hx     Ovarian cancer Neg Hx        Social History     Socioeconomic History    Marital status:    Tobacco Use    Smoking status: Never    Smokeless tobacco: Never   Substance and Sexual Activity    Alcohol use: No    Drug use: No    Sexual activity: Yes     Partners: Male     Birth control/protection: None     Comment:        Review of Systems   Constitutional:  Negative for activity change, fatigue, fever and unexpected weight change.   HENT:  Negative for congestion, ear pain, hearing loss, rhinorrhea and sore throat.    Eyes:  Negative for pain, redness and visual disturbance.   Respiratory:  Negative for cough, shortness of breath and wheezing.    Cardiovascular:  Negative for chest pain, palpitations and leg swelling.   Gastrointestinal:  Negative for abdominal pain, constipation, diarrhea, " nausea and vomiting.   Genitourinary:  Negative for dysuria, frequency and urgency.   Musculoskeletal:  Positive for back pain and neck pain. Negative for joint swelling.   Skin:  Negative for color change, rash and wound.   Neurological:  Positive for numbness. Negative for dizziness, tremors, weakness, light-headedness and headaches.       Objective:      Physical Exam  Vitals reviewed.   Constitutional:       General: She is not in acute distress.     Appearance: She is well-developed.   HENT:      Head: Normocephalic and atraumatic.      Right Ear: External ear normal.      Left Ear: External ear normal.      Nose: Nose normal.   Eyes:      General:         Right eye: No discharge.         Left eye: No discharge.      Extraocular Movements: Extraocular movements intact.      Conjunctiva/sclera: Conjunctivae normal.      Pupils: Pupils are equal, round, and reactive to light.   Neck:      Thyroid: No thyromegaly.   Cardiovascular:      Rate and Rhythm: Normal rate and regular rhythm.   Pulmonary:      Effort: Pulmonary effort is normal.   Skin:     General: Skin is warm and dry.   Neurological:      Mental Status: She is alert and oriented to person, place, and time.      Cranial Nerves: No cranial nerve deficit.   Psychiatric:         Behavior: Behavior normal.         Thought Content: Thought content normal.       Assessment:       1. Cervical radiculopathy    2. Class 1 obesity due to excess calories with serious comorbidity and body mass index (BMI) of 32.0 to 32.9 in adult        Plan:       Keisha was seen today for neck pain, arm pain and hip pain.    Diagnoses and all orders for this visit:    Cervical radiculopathy  -     Ambulatory referral/consult to Physical/Occupational Therapy; Future  New problem  Start PT for intermittent sx  Would like benefit form some decompression  If not effective will consider EMG next to better pinpoint if cervical vs ulnar tunnel but sx are from shoulder down currently  PRN  tyelnol ibupfoen  Avoid exacerbating movement  s  Class 1 obesity due to excess calories with serious comorbidity and body mass index (BMI) of 32.0 to 32.9 in adult  -     Ambulatory referral/consult to Nutrition Services; Future  Wanting to discuss diet optoins with nutrition

## 2022-09-28 NOTE — TELEPHONE ENCOUNTER
----- Message from Vikki Camacho sent at 2022  2:14 PM CDT -----  Contact: pt  Keisha Mena  MRN: [id number]  : 1990  PCP: Lisa Fung  Home Phone      [phone]  Work Phone      Not on file.  Mobile          [phone]      MESSAGE: pt is asking if you send a referral to all the therapy places or just one? Pt would prefer to get In with one of the following. It can be whoever can take her the soonest.    Pt would like to see if [facility] or [facility] has an availability to see her.        [phone]

## 2022-10-05 ENCOUNTER — CLINICAL SUPPORT (OUTPATIENT)
Dept: REHABILITATION | Facility: HOSPITAL | Age: 32
End: 2022-10-05
Attending: INTERNAL MEDICINE
Payer: COMMERCIAL

## 2022-10-05 DIAGNOSIS — M54.12 CERVICAL RADICULOPATHY: Primary | ICD-10-CM

## 2022-10-05 DIAGNOSIS — R29.3 ABNORMAL POSTURE: ICD-10-CM

## 2022-10-05 PROCEDURE — 97110 THERAPEUTIC EXERCISES: CPT | Mod: PN

## 2022-10-05 PROCEDURE — 97161 PT EVAL LOW COMPLEX 20 MIN: CPT | Mod: PN

## 2022-10-05 PROCEDURE — 97140 MANUAL THERAPY 1/> REGIONS: CPT | Mod: PN

## 2022-10-05 NOTE — PLAN OF CARE
[facility]   Physical Therapy Initial Evaluation     Date: 10/5/2022   Name: Keisha Mena  [facility] Number: [id number]    Therapy Diagnosis:   Encounter Diagnoses   Name Primary?    Cervical radiculopathy Yes    Abnormal posture      Physician: Lisa Fung MD    Physician Orders: PT Eval and Treat   Medical Diagnosis from Referral: cervical radiculopathy  Evaluation Date: 10/5/2022  Authorization Period Expiration: 11/5/2022  Plan of Care Expiration: 11/30/2022  Progress Note Due: 11/5/2022  Visit # / Visits authorized: 1/ pending   FOTO: 1/3    Precautions: Standard     Time In: 1:45PM  Time Out: 2:30 PM  Total Appointment Time (timed & untimed codes): 45 minutes      SUBJECTIVE     Date of onset: July 2022    History of current condition - Keisha reports: She has had worsening neck pain and bilateral upper extremity numbness and tingling for the past 3 months. She states that it is most severe in the morning and decreased in severity throughout the day. Denies any weakness or dropping objects. Reports forearm tightness that is also new. The numbness and tingling she feels like stems from the neck/shoulder and extends all the way to her finger tips. She feels like her left side tends to be worse. She is waking up multiple times a night due to the numbness and tingling.     She has trailed different sleeping positions and new pillows with no relief. She only sleeps with one pillow.     Falls: None    Imaging, none:     Prior Therapy: Yes - pelvic floor  Social History:  lives with their family and lives with their spouse  Occupation: [employer]  Prior Level of Function: no restrictions  Current Level of Function: decreased sleep and increased weakness in AM    Pain:  Current 3/10, worst 5/10, best 2/10   Location: bilateral head , neck , and shoulder  (L>R)  Description: Tight, Tingling, and Numb  Aggravating Factors: sleeping  Easing Factors: nothing    Non-Mechanical Origin:  Night Pain?  No  Hx  of Cancer?  No  Trauma?  No  Sudden bowel/bladder changes?  No  Bone Density compromise?  No    Patients goals: cessation of numbness and tingling and neck pain     Medical History:   Past Medical History:   Diagnosis Date    Anxiety     Female bladder prolapse     Hypertension        Surgical History:   Keisha Mena  has a past surgical history that includes Diagnostic laparoscopy (N/A, 2/7/2021) and Laparoscopic salpingectomy (Left, 2/7/2021).    Medications:   Keisha has a current medication list which includes the following prescription(s): labetalol and pnv comb12/iron cb/fa1/dss/dha.    Allergies:   Review of patient's allergies indicates:  No Known Allergies     OBJECTIVE   OBSERVATIONS: Patient is a 31 year old female who ambulates with no AD and no apparent signs of distress.  No signs of atrophy at the SCM, traps, deltoids, supraspinatus.    POSTURE:  Patient is significant for moderate forward head and internally rotated shoulders. Upper t-spine flexion also observed.    Scapular Posture [place] (measures hands on hips Inferior angle to t-spine > 1.5cm abnormal): R L  Anterior tipping YES   Winging YES   [place] YES     CERVICAL ACTIVE RANGE OF MOTION   Flexion 32 deg   Extension 45 deg    Right Side Bend 36 deg   Left Side Bend 36 deg   Right Rotation 70 deg   Left Rotation 70 deg   Craniocervical Angle 35 deg     SHOULDER ACTIVE RANGE OF MOTION    RIGHT LEFT   Flexion WITHIN NORMAL LIMITS  WITHIN NORMAL LIMITS    Scaption WITHIN NORMAL LIMITS  WITHIN NORMAL LIMITS    External Rotation WITHIN NORMAL LIMITS  WITHIN NORMAL LIMITS    Internal Rotation (Apley Scratch Test) WITHIN NORMAL LIMITS  WITHIN NORMAL LIMITS    Extension WITHIN NORMAL LIMITS  WITHIN NORMAL LIMITS      UPPER LOWER EXTREMITY STRENGTH    Left  Right    Shoulder Flexion 4+/5 4+/5   Shoulder Abduction 4+/5 4+/5   Shoulder External Rotation 4-/5 4-/5   Shoulder Internal Rotation 4-/5 4-/5   Elbow Flexion 5/5 5/5   Elbow Extension 5/5 5/5     Strength WITHIN FUNCTIONAL LIMITS  WITHIN FUNCTIONAL LIMITS      PRONE SCAPULAR STRENGTH    RIGHT LEFT   Flexion 4-/5 4-/5   Abduction 4-/5 4-/5   Extension 4-/5 4-/5     DERMATOMES: Sensation: Light Touch: Intact    SPECIAL TESTS   Vertbral Artery Screen Negative   Sharps Lisa Test Negative   Alar Ligament Test Negative   Transverse Ligament Test Negative   Sprulings Test Negative   Distraction Test Positive    Deep Neck Flexor Endurance Test 7 seconds    ULNT Median Nerve Bias Negative    ULNT Ulnar Nerve Bias Negative    ULNT Radial Nerve Bias Negative   Andres Test 10 seconds (forearm tightness and tingling)    Adson's Test      PALPATION:  (L>R)  (+) Upper Trapezius - immediate onset of bilateral arm numbness and tingling  (+) Levator Scapulae  (+) Sub-Occipital  (+) Paraspainals  (+) Scalenes  (+) Pectoralis Major/Minor  (+) infraspinatus    Prone PA segmental testing:   Cervical Spine: Moderate hypomobility throughout with concordant symptoms C3-6  Thoracic Spine: Moderate hypomobility throughout with concordant symptoms T1-7    Passive IV Motion Testing:  OA (rotation with head nod): WITHIN FUNCTIONAL LIMITS   AA (maximum flexion/rotation): WITHIN FUNCTIONAL LIMITS   Mid Cervical (segmental sideglides): restricted L to R excursion - increased pain  Lower Cervical (segmental sideglides): restricted L to R excursion - increased pain    Limitation/Restriction for FOTO NDI Survey    Therapist reviewed FOTO scores for Keisha Mena on 10/5/2022.   FOTO documents entered into [facility] - see Media section.    Limitation Score: 61.67%       TREATMENT     Total Treatment time (time-based codes) separate from Evaluation: 15 minutes      Keisha received the treatments listed below:      therapeutic exercises to develop strength, endurance, ROM, flexibility, posture, and core stabilization for 15 minutes including:  Supine Chin Tuck -  2 sets - 10 reps - 5 hold  Seated Cervical Retraction - 2 sets - 10 reps - 5  hold  Seated Upper Trapezius Stretch - 3 sets - 30 hold  Seated Levator Scapulae Stretch - 3 sets - 30 hold  Seated Scalenes Stretch - 3 sets - 10 reps - 30s hold  Seated Scapular Retraction - 3 sets - 10 reps  Sidelying Thoracic Rotation with Open Book - 2 sets - 10 reps  Seated Thoracic Lumbar Extension - 2 sets - 10 reps  Cat Cow- 2 sets - 10 reps  Single Arm Doorway Pec Stretch at 90 Degrees Abduction - 3 sets - 30 hold    manual therapy techniques: Joint mobilizations, Manual traction, and Soft tissue Mobilization were applied to the: cervical spine for 10 minutes, including:  Gentle cervical ROM  SOFT-TISUE MOBILIZATION to UPPER TRAPEZIUS, SUBOCCIPITALS, paraspinals, LEVATOR SCAPULAE, pec minor    PATIENT EDUCATION AND HOME EXERCISES     Education provided:   - Importance of upright posture to limit cervical stress  - Anatomy and pathophysiology of s/sx of bilateral arm numbness and tingling   - Pt/family was provided educational information, including: role of PT, goals for PT, scheduling - pt verbalized understanding. Discussed insurance limitations with pt.     Written Home Exercises Provided: yes. Exercises were reviewed and Keisha was able to demonstrate them prior to the end of the session.  Keisha demonstrated good  understanding of the education provided. See EMR under Patient Instructions for exercises provided during therapy sessions.    ASSESSMENT     Keisha is a 31 y.o. female referred to outpatient Physical Therapy with a medical diagnosis of cervical radiculopathy. Patient presents with s/sx associated with thoracic outlet syndrome. Patient presents with moderate FORWARD HEAD POSTURE and protracted and internally rotated shoulder. Patient presents with significant tone and overactivity of bilateral UPPER TRAPEZIUS and SUBOCCIPITALS. This condition has been slowly worsening over the last 3 months - however patient denies all weakness and  strength deficits.    Medical necessity is demonstrated by  the following IMPAIRMENTS/PROBLEMS:  -Decreased function (NDI)   -Increased pain (NPS)   -Decreased pain free cervical AROM   -Decreased UE strength (MMT)   -Impaired sitting posture (Craniocervical angle)  -Decreased participation in regular exercise routine  - (+) TTP: UPPER TRAPEZIUS, LEVATOR SCAPULAE, SUBOCCIPITALS, Pectoralis minor and major. (L>R)    Intervention strategies designed to address these impairments will be instrumental in achieving the stated functional goals, including her ability to safely perform mobility tasks. Based on the examination, the patient's rehabilitation potential to achieve functional goals is good.    Patient prognosis is Good.   Patient will benefit from skilled outpatient Physical Therapy to address the deficits stated above and in the chart below, provide patient /family education, and to maximize patientt's level of independence.     Plan of care discussed with patient: Yes  Patient's spiritual, cultural and educational needs considered and patient is agreeable to the plan of care and goals as stated below:     Anticipated Barriers for therapy: none    Medical Necessity is demonstrated by the following  History  Co-morbidities and personal factors that may impact the plan of care Co-morbidities:   anxiety, difficulty sleeping, high BMI, and HTN    Personal Factors:   no deficits     low   Examination  Body Structures and Functions, activity limitations and participation restrictions that may impact the plan of care Body Regions:   head  neck  upper extremities    Body Systems:    gross symmetry  ROM  strength  gross coordinated movement    Participation Restrictions:   Patient cannot participate in pain free community activities.     Activity limitations:   Learning and applying knowledge  no deficits    General Tasks and Commands  no deficits    Communication  no deficits    Mobility  no deficits    Self care  no deficits    Domestic Life  no deficits       low   Clinical  Presentation stable and uncomplicated low   Decision Making/ Complexity Score: low     Goals:  Short Term Goals: 4 weeks   1. Patient demonstrates independence with HEP.   2. Patient demonstrates independence with Postural Awareness.   3. Patient will improve cervical flexion to >40 degs  4. Patient will be able to raise her arms without the onset of numbness and tingling in BUE.    Long Term Goals: 8 weeks   1. Patient will be able to sleep throught the night with < 1 sleep disruption due to neck pain or numbness and tingling.  2. Patient demonstrates increased strength BUE's to 4+/5 or greater to improve tolerance to functional activities.   3. Patient demonstrates improved overall function per NDI to 25% or less.   4. Patient will improve craniocervical angle to >40 degrees in order to demonstrate improvement in FORWARD HEAD POSTURE.     PLAN   Plan of care Certification: 10/5/2022 to 11/30/2022.    Outpatient Physical Therapy 1 times weekly for 8 weeks to include the following interventions: Cervical/Lumbar Traction, Electrical Stimulation  , Manual Therapy, Moist Heat/ Ice, Neuromuscular Re-ed, Patient Education, Self Care, Therapeutic Activities, and Therapeutic Exercise.     Maira Bhardwaj, PT    Pt may be seen by PTA as part of the rehabilitation team.     Therapist: Maira Bhardwaj PT  10/5/2022    I CERTIFY THE NEED FOR THESE SERVICES FURNISHED UNDER THIS PLAN OF TREATMENT AND WHILE UNDER MY CARE   Physician's comments:     Physician's Signature: ___________________________________________________

## 2022-10-10 ENCOUNTER — CLINICAL SUPPORT (OUTPATIENT)
Dept: REHABILITATION | Facility: HOSPITAL | Age: 32
End: 2022-10-10
Payer: COMMERCIAL

## 2022-10-10 DIAGNOSIS — M54.12 CERVICAL RADICULOPATHY: Primary | ICD-10-CM

## 2022-10-10 DIAGNOSIS — R29.3 ABNORMAL POSTURE: ICD-10-CM

## 2022-10-10 PROCEDURE — 97110 THERAPEUTIC EXERCISES: CPT | Mod: PN

## 2022-10-10 PROCEDURE — 97140 MANUAL THERAPY 1/> REGIONS: CPT | Mod: PN

## 2022-10-10 NOTE — PROGRESS NOTES
[facility]   Physical Therapy Treatment Note     Name: Keisha Trina  [facility] Number: [id number]    Therapy Diagnosis:   Encounter Diagnoses   Name Primary?    Cervical radiculopathy Yes    Abnormal posture      Physician: Lisa Fung MD    Visit Date: 10/10/2022    Physician Orders: PT Eval and Treat   Medical Diagnosis from Referral: cervical radiculopathy  Evaluation Date: 10/5/2022  Authorization Period Expiration: 11/5/2022  Plan of Care Expiration: 11/30/2022  Progress Note Due: 11/5/2022  Visit # / Visits authorized: 2/20 (including evaluation)  FOTO: 1/3    PTA Visit #: 0/5     Time In: 8:45AM  Time Out: 9:30AM  Total Billable Time: 45 minutes    SUBJECTIVE     Pt reports: that she notices increased lifting at the wedding venue makes her symtpoms worse. She has had increased muscle twitching at night since starting her home exercise program.   She was compliant with home exercise program.  Response to previous treatment: No complaints  Functional change: improved cervical mobility    Pain: 3/10  Location: bilateral neck , torso , and bilateral upper extremity       OBJECTIVE     Objective Measures updated at progress report unless specified.     Treatment     Keisha received the treatments listed below:      therapeutic exercises to develop strength, endurance, ROM, flexibility, posture, and core stabilization for 15 minutes including:  Supine Chin Tuck -  2 sets - 10 reps - 5 hold  Seated Cervical Retraction - 2 sets - 10 reps - 5 hold  Seated Upper Trapezius Stretch - 3 sets - 30 hold  Seated Levator Scapulae Stretch - 3 sets - 30 hold  Seated Scalenes Stretch - 3 sets - 10 reps - 30s hold  Seated Scapular Retraction - 3 sets - 10 reps  Sidelying Thoracic Rotation with Open Book - 2 sets - 10 reps  Seated Thoracic Lumbar Extension - 2 sets - 10 reps  Cat Cow- 2 sets - 10 reps  Single Arm Doorway Pec Stretch at 90 Degrees Abduction - 3 sets - 30 hold    manual therapy techniques:  Joint mobilizations, Manual traction, and Soft tissue Mobilization were applied to the: cervical and thoracic spine for 23 minutes, including:  Soft-tisue mobilization suboccipitals, scalenes, SCM, upper trapezius, levator scapulae R>L  Manual upper trapezius stretching  Thoracic Spine CPA T1-7    neuromuscular re-education activities to improve: Balance, Coordination, Kinesthetic, Sense, Proprioception, and Posture for 0 minutes. The following activities were included:      therapeutic activities to improve functional performance for 0  minutes, including:      gait training to improve functional mobility and safety for 0  minutes, including:    Patient Education and Home Exercises     Home Exercises Provided and Patient Education Provided     Education provided:   - explanation of how lifting with anterior load can increased chest tightness without check and balances into posture.    Written Home Exercises Provided: yes. Exercises were reviewed and Keisha was able to demonstrate them prior to the end of the session.  Keisha demonstrated good  understanding of the education provided. See EMR under Patient Instructions for exercises provided during therapy sessions    ASSESSMENT     Minor corrections on form provided for home exercise program. Patient demonstrates increased left sided stiffness in thoracic mobility. Continues to have hypertrophy and over activation of upper trapezius with exercises. Provided cues for placing shoulders in her back pockets to help with scapular depression. Patient reports improved mobility and decreased tightness after session. Encouraged stretching and mobility before and after upcoming travel plans.    Keisha Is progressing well towards her goals.   Pt prognosis is Excellent.     Pt will continue to benefit from skilled outpatient physical therapy to address the deficits listed in the problem list box on initial evaluation, provide pt/family education and to maximize pt's level of  independence in the home and community environment.     Pt's spiritual, cultural and educational needs considered and pt agreeable to plan of care and goals.     Anticipated barriers to physical therapy: nonea    Goals: Short Term Goals: 4 weeks   1. Patient demonstrates independence with HEP.   2. Patient demonstrates independence with Postural Awareness.   3. Patient will improve cervical flexion to >40 degs  4. Patient will be able to raise her arms without the onset of numbness and tingling in BUE.     Long Term Goals: 8 weeks   1. Patient will be able to sleep throught the night with < 1 sleep disruption due to neck pain or numbness and tingling.  2. Patient demonstrates increased strength BUE's to 4+/5 or greater to improve tolerance to functional activities.   3. Patient demonstrates improved overall function per NDI to 25% or less.   4. Patient will improve craniocervical angle to >40 degrees in order to demonstrate improvement in FORWARD HEAD POSTURE.     PLAN     Plan of care Certification: 10/5/2022 to 11/30/2022.     Outpatient Physical Therapy 1 times weekly for 8 weeks to include the following interventions: Cervical/Lumbar Traction, Electrical Stimulation  , Manual Therapy, Moist Heat/ Ice, Neuromuscular Re-ed, Patient Education, Self Care, Therapeutic Activities, and Therapeutic Exercise.      Maira Bhradwaj PT     Pt may be seen by PTA as part of the rehabilitation team.      Therapist: Maira Bhardwaj PT  10/5/2022       Maira Bhardwaj PT

## 2022-10-18 ENCOUNTER — CLINICAL SUPPORT (OUTPATIENT)
Dept: REHABILITATION | Facility: HOSPITAL | Age: 32
End: 2022-10-18
Attending: INTERNAL MEDICINE
Payer: COMMERCIAL

## 2022-10-18 DIAGNOSIS — M54.12 CERVICAL RADICULOPATHY: Primary | ICD-10-CM

## 2022-10-18 PROCEDURE — 97110 THERAPEUTIC EXERCISES: CPT | Mod: PN,CQ

## 2022-10-18 PROCEDURE — 97140 MANUAL THERAPY 1/> REGIONS: CPT | Mod: PN,CQ

## 2022-10-18 NOTE — PROGRESS NOTES
"[facility]   Physical Therapy Treatment Note     Name: Keisha Mena  Clinic Number: [id number]    Physician: Lisa Fung MD    Visit Date: 10/18/2022    Physician Orders: PT Eval and Treat   Medical Diagnosis from Referral: cervical radiculopathy  Evaluation Date: 10/5/2022  Authorization Period Expiration: 11/5/2022  Plan of Care Expiration: 11/30/2022  Progress Note Due: 11/5/2022  Visit # / Visits authorized: 3/20 (including evaluation)  FOTO: 1/3    PTA Visit #: 1/5     Time In: 1145 AM  Time Out: 1236 AM  Total Billable Time:  51 minutes    SUBJECTIVE     Pt reports: R side cervical pain > L side, but with overall decrease. Pt reports discomfort in forearms is "better." Pt reports carry bags d/t recent travels.   She was compliant with home exercise program.  Response to previous treatment: No complaints  Functional change: improved cervical mobility    Pain: 2/10  Location: bilateral neck, torso, and bilateral upper extremity       OBJECTIVE     Objective Measures updated at progress report unless specified.     Treatment     Keisha received the treatments listed below:      therapeutic exercises to develop strength, endurance, ROM, flexibility, posture, and core stabilization for 32 minutes including:  Supine Chin Tuck -  2 sets - 10 reps - 5 hold  Seated Cervical Retraction - 2 sets - 10 reps - 5 hold  Seated Upper Trapezius Stretch - 3 sets - 30 hold  Seated Levator Scapulae Stretch - 3 sets - 30 hold  Seated Scalenes Stretch -  sets - 0 reps - 0s hold - deferred  Seated Scapular Retraction - 2 sets - 10 reps  Sidelying Thoracic Rotation with Open Book - 2 sets - 10 reps  Seated Thoracic Lumbar Extension - 2 sets - 10 reps  Cat Cow- 2 sets - 10 reps  Single Arm Doorway Pec Stretch at 90 Degrees Abduction - 3 sets - 30 hold  Seated reverse shoulder rolls 2x10    manual therapy techniques: Joint mobilizations, Manual traction, and Soft tissue Mobilization were applied to the: cervical " "and thoracic spine for 19 minutes, including:  Supine position:  Soft-tisue mobilization suboccipitals, scalenes, SCM, upper trapezius, levator scapulae R>L  Manual upper trapezius stretching  Thoracic Spine CPA T1-7 (deferred)    neuromuscular re-education activities to improve: Balance, Coordination, Kinesthetic, Sense, Proprioception, and Posture for 0 minutes. The following activities were included:    therapeutic activities to improve functional performance for 0 minutes, including:    gait training to improve functional mobility and safety for 0 minutes, including:    Patient Education and Home Exercises     Home Exercises Provided and Patient Education Provided     Education provided:   - stretch techniques for muscle elongation    Written Home Exercises Provided: yes. Exercises were reviewed and Keisha was able to demonstrate them prior to the end of the session.  Keisha demonstrated good  understanding of the education provided. See EMR under Patient Instructions for exercises provided during therapy sessions    ASSESSMENT     Pt is experiencing some "popping" sensations near R shoulder in location of superior GH joint. Decreased episodes of occurrence when manually providing slight distraction to the GH joint during mobility. Pt reports most difficult ex is the doorway stretch on R UE which elicits "burning" in R forearm reported at 3/10.     Keisha Is progressing well towards her goals.   Pt prognosis is Excellent.     Pt will continue to benefit from skilled outpatient physical therapy to address the deficits listed in the problem list box on initial evaluation, provide pt/family education and maximize pt's level of independence in the home and community environment.     Pt's spiritual, cultural and educational needs considered and pt agreeable to plan of care and goals.     Anticipated barriers to physical therapy: nonea    Goals: Short Term Goals: 4 weeks   1. Patient demonstrates independence with HEP.   2. " Patient demonstrates independence with Postural Awareness.   3. Patient will improve cervical flexion to >40 degs  4. Patient will be able to raise her arms without the onset of numbness and tingling in BUE.     Long Term Goals: 8 weeks   1. Patient will be able to sleep throught the night with < 1 sleep disruption due to neck pain or numbness and tingling.  2. Patient demonstrates increased strength BUE's to 4+/5 or greater to improve tolerance to functional activities.   3. Patient demonstrates improved overall function per NDI to 25% or less.   4. Patient will improve craniocervical angle to >40 degrees in order to demonstrate improvement in FORWARD HEAD POSTURE.     PLAN     Plan of care Certification: 10/5/2022 to 11/30/2022.     Outpatient Physical Therapy 1 times weekly for 8 weeks to include the following interventions: Cervical/Lumbar Traction, Electrical Stimulation  , Manual Therapy, Moist Heat/ Ice, Neuromuscular Re-ed, Patient Education, Self Care, Therapeutic Activities, and Therapeutic Exercise.      Maira Bhardwaj, PT     Dee Valdez, PTA

## 2022-10-24 ENCOUNTER — CLINICAL SUPPORT (OUTPATIENT)
Dept: REHABILITATION | Facility: HOSPITAL | Age: 32
End: 2022-10-24
Payer: COMMERCIAL

## 2022-10-24 ENCOUNTER — NUTRITION (OUTPATIENT)
Dept: DIABETES | Facility: CLINIC | Age: 32
End: 2022-10-24
Payer: COMMERCIAL

## 2022-10-24 VITALS — WEIGHT: 169.31 LBS | BODY MASS INDEX: 31.99 KG/M2

## 2022-10-24 DIAGNOSIS — M54.12 CERVICAL RADICULOPATHY: Primary | ICD-10-CM

## 2022-10-24 DIAGNOSIS — E66.09 CLASS 1 OBESITY DUE TO EXCESS CALORIES WITH SERIOUS COMORBIDITY AND BODY MASS INDEX (BMI) OF 32.0 TO 32.9 IN ADULT: ICD-10-CM

## 2022-10-24 DIAGNOSIS — R29.3 ABNORMAL POSTURE: ICD-10-CM

## 2022-10-24 PROCEDURE — 97110 THERAPEUTIC EXERCISES: CPT | Mod: PN

## 2022-10-24 PROCEDURE — 97140 MANUAL THERAPY 1/> REGIONS: CPT | Mod: PN

## 2022-10-24 PROCEDURE — 99999 PR PBB SHADOW E&M-EST. PATIENT-LVL II: CPT | Mod: PBBFAC,,, | Performed by: DIETITIAN, REGISTERED

## 2022-10-24 PROCEDURE — 99999 PR PBB SHADOW E&M-EST. PATIENT-LVL II: ICD-10-PCS | Mod: PBBFAC,,, | Performed by: DIETITIAN, REGISTERED

## 2022-10-24 NOTE — PATIENT INSTRUCTIONS
[employer] OF [facility], has denied the service due to Not a Covered Benefit, N/A. This is an automated In Basket notification that does not require a reply. For a more detailed explanation, or for questions regarding this insurance denial, send an In Basket message to the Pre Service Intake pool or call the [facility] Pre-Service department at [phone] and reference referral ID [id number].The Pre-Service department hours are M-F 8 a.m. to 5 p.m.      Blood pressure is affected by sodium (salt)  Triglycerides are affected by sugar, fat, tobacco, alcohol, & inactivity  A healthy weight increases fertility.  When BMI reaches 25 or more fertility decreases.       Plan  Continue daily prenatal vitamin   Intake:  Eat regular meals or use a meal replacement instead of skipping.    Eat breakfast within 1-2 hours of waking up  For meals: Use healthy plate example as a guide with 1/2 plate of watery/non-starchy vegetables (see list below), 1/4 plate starch, and 1/4 plate meat; if hungry, additional vegetables are allowed  For Meal replacements: Aim for meal replacement shakes or bars with ~180-280 calories, 5g or more of fiber, and 10g or more of protein per serving. (The protein and fiber help keep you full.)  Meal Replacement Shakes: Premier Protein Shake, Slim Fast Advanced Nutrition Shake, Slim Fast Original Shake, Slim Fast Advanced Energy Shake (has 99 mg caffeine per 11 oz shake),  Atkins Shake, Atkins Plus Shake, [place]'s® Special K® Protein Shakes, Chobani Complete (190 calories, 25 g protein, 17 g carbs, 12g sugar, 3 g fiber, 2.5g fat)  Meal Replacement Bars:  Atkins Bar (260 cals, 12g fiber, 16 g pro), Tahir Bar (260 cals, 4g fiber, 9 g pro), Tahir Builders Protein Bar (280 cals, 3g fiber, 20g pro), Slim Fast Bar (190 cals, 7g pro, 9g fiber), [place] Protein Bar (180 cals, 12g pro, 3 g fiber)    Snacks: Choose healthy snacks 100-150 calories like fruits, vegetables or low-fat dairy; Limit to 1-2 snacks per day   Beverages:  "drink water, sugar free/zero calorie beverages (diet soda, zero soda; zero calorie flavored water;  sugar free Crystal Lite; sugar free  zero calorie "On the Go!" drink mix like zero sugar Koolaid and [employer] Rootbeer;   unsweetened tea, unsweetened tea with Splenda/Equal/stevia/Sweet N Low) (Gatorade Zero and Powerade Zero can also be used but are not recommended for anyone avoiding sodium/salt or potassium)  If eating restaurant food:  Use an suzanne or go online to look for the healthiest, lowest calorie choices  Order a smaller amount of food and add a side salad and/or a fruit serving to your meal to decrease your intake of fat, sugar, sodium, and calories   Exercise:  Aim for 10,000 steps every day or 1 hour every day of moderate to vigorous activity. The exercise should make you breathe hard and break a sweat.    Increase exercise gradually.  Track exercise: Use a watch, fitness tracker, suzanne or write down what you do, how much you do (time & distance), how often you do it, and how hard/easy it is.  You can also record your pulse.          Non-starchy vegetables: Artichokes, Artichoke hearts, Asparagus, Bamboo shoots, Beans (green, wax, Kyrgyz - do not confuse this with legumes which are starchy- white beans, navy beans, black beans, etc), Bean sprouts, Beets, [place] sprouts, Broccoli, Cabbage (green, bok travis, Chinese, red), Carrots , Cauliflower, Celery, Chicory, Chayote (mirliton), Cucumbers, Daikon, Eggplant, Greens (jamari, kale, mustard, turnip), Hearts of palm, Jicama, Kohlrabi, Leeks, Lettuce (endive, escarole, leaf, iceberg, Pankaj),Mushrooms, Okra, Onions, Pea pods, Peppers (all types) , Radishes, Rutabaga, Salad greens (chicory, endive, escarole, lettuce, pankaj, spinach, arugula, radicchio, watercress), Snow peas or pea pods, Scallions, Sprouts, Squash (cushaw, summer, crookneck, spaghetti, zucchini), Sugar snap peas, Swiss chard, String beans (green beans, snap beans), Tomatoes, Turnips, Water " chestnuts, Zucchini

## 2022-10-24 NOTE — PROGRESS NOTES
[facility]   Physical Therapy Treatment Note     Name: Keisha [facility] Number: [id number]    Physician: Lisa Fung MD    Visit Date: 10/24/2022    Physician Orders: PT Eval and Treat   Medical Diagnosis from Referral: cervical radiculopathy  Evaluation Date: 10/5/2022  Authorization Period Expiration: 11/5/2022  Plan of Care Expiration: 11/30/2022  Progress Note Due: 11/5/2022  Visit # / Visits authorized: 4/20 (including evaluation)  FOTO: 1/3    PTA Visit #: 1/5     Time In: 8:45 AM  Time Out: 9:30 AM  Total Billable Time:  45 minutes    SUBJECTIVE     Pt reports: She is feeling better overall - sleeping better, improved numbness and tingling and forearm pain.   She was compliant with home exercise program.  Response to previous treatment: No complaints  Functional change: improved cervical mobility    Pain: 2/10  Location: bilateral neck, torso, and bilateral upper extremity       OBJECTIVE     Objective Measures updated at progress report unless specified.     Treatment     Keisha received the treatments listed below:      therapeutic exercises to develop strength, endurance, ROM, flexibility, posture, and core stabilization for 23 minutes including:  Supine Chin Tuck -  2 sets - 10 reps - 5 hold  Seated Cervical Retraction - 2 sets - 10 reps - 5 hold  Seated Upper Trapezius Stretch - 3 sets - 30 hold  Seated Levator Scapulae Stretch - 3 sets - 30 hold  Seated Scalenes Stretch -  sets - 0 reps - 0s hold - deferred  Seated Scapular Retraction - 2 sets - 10 reps  Cat Cow- 2 sets - 10 reps  Thread the Needle 2x10  T-Spine Rotation with Lumbar Rock 2x10    Add Next Visit:   Single Arm Row  on cable Machine 2x10  Single Arm Extension  2x10    manual therapy techniques: Joint mobilizations, Manual traction, and Soft tissue Mobilization were applied to the: cervical and thoracic spine for 22 minutes, including:  Supine position:  Soft-tisue mobilization suboccipitals, scalenes, SCM, upper  trapezius, levator scapulae R>L  Manual upper trapezius stretching  Thoracic Spine CPA T1-7   Trigger Point Dry Needling 6 30x30 needles - bilateral upper trapezius (x2 needles), suboccipitals (x4) - left in situ for 5 minutes  - patient reports concordant signs with right sided needling - multiple twitch responses noted in right upper trapezius     neuromuscular re-education activities to improve: Balance, Coordination, Kinesthetic, Sense, Proprioception, and Posture for 0 minutes. The following activities were included:    therapeutic activities to improve functional performance for 0 minutes, including:    gait training to improve functional mobility and safety for 0 minutes, including:    Patient Education and Home Exercises     Home Exercises Provided and Patient Education Provided     Education provided:   - stretch techniques for muscle elongation    Written Home Exercises Provided: yes. Exercises were reviewed and Keisha was able to demonstrate them prior to the end of the session.  Keisha demonstrated good  understanding of the education provided. See EMR under Patient Instructions for exercises provided during therapy sessions    ASSESSMENT   Patient and therapist discusses benefits of trigger point dry needling. Patient education about purpose, use, and expected outcomes. Patient was agreeablet and signed consent. 6 needles were used to help release upper trapezius and suboccipitals tightness.Patient reported no adverse signs following the needling procedure. Improved upper t-spine mobility with . Notable decreased in right rotation with lumbar lock. Modifications provided with pectoralis wall stretch to avoid increased numbness and tingling.       Keisha Is progressing well towards her goals.   Pt prognosis is Excellent.     Pt will continue to benefit from skilled outpatient physical therapy to address the deficits listed in the problem list box on initial evaluation, provide pt/family education and  maximize pt's level of independence in the home and community environment.     Pt's spiritual, cultural and educational needs considered and pt agreeable to plan of care and goals.     Anticipated barriers to physical therapy: nonea    Goals: Short Term Goals: 4 weeks   1. Patient demonstrates independence with HEP.   2. Patient demonstrates independence with Postural Awareness.   3. Patient will improve cervical flexion to >40 degs  4. Patient will be able to raise her arms without the onset of numbness and tingling in BUE.     Long Term Goals: 8 weeks   1. Patient will be able to sleep throught the night with < 1 sleep disruption due to neck pain or numbness and tingling.  2. Patient demonstrates increased strength BUE's to 4+/5 or greater to improve tolerance to functional activities.   3. Patient demonstrates improved overall function per NDI to 25% or less.   4. Patient will improve craniocervical angle to >40 degrees in order to demonstrate improvement in FORWARD HEAD POSTURE.     PLAN     Plan of care Certification: 10/5/2022 to 11/30/2022.     Outpatient Physical Therapy 1 times weekly for 8 weeks to include the following interventions: Cervical/Lumbar Traction, Electrical Stimulation  , Manual Therapy, Moist Heat/ Ice, Neuromuscular Re-ed, Patient Education, Self Care, Therapeutic Activities, and Therapeutic Exercise.      Maira Bhardwaj, PT     Maira Bhardwaj, PT

## 2022-10-24 NOTE — PROGRESS NOTES
"INITIAL MNT PROGRESS NOTE    Referring Provider: Lisa Fung MD    Dx:   Encounter Diagnosis   Name Primary?    Class 1 obesity due to excess calories with serious comorbidity and body mass index (BMI) of 32.0 to 32.9 in adult         A = Nutrition Assessment  Learning barriers none identified    Pt reports Mainly interested in controlling BP & TG, but also weight; is unsure of what to eat & how much to eat  Currently trying to conceive; taking daily PNV    Was not contacted by pre-service dept about insurance denial    Usual intake & frequency intake:  Meal pattern: Eats 3 meals & snacks  Breakfast: oatmeal w/blueberries  Snacks: yogurt or crackers w/cheese & nuts  Restaurant intake: increased after having 3 YO son; currently-  4 times weekly;  [employer], [place], [employer]  Vegetables: likes a variety  ; eats seldom  Fruit: likes a variety ;  daily intake;  blueberries & bananas most often  Milk/dairy:  dislikes milk but will have 2% milk in cereal; likes yogurt & cheese and consumes at least 4 x week  Meat/protein:  daily   Starches:  daily     Beverages:   water, unsweetened tea, or 1/2 sweet & 1/2 unsweet tea; very rarely will have a sprite   Social Hx Tobacco: none   ETOH: none   Activity  No planned activity; works as  & 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    Weight as of 9/28/22: 77.1 kg (169 lb 15.6 oz).        Biochemical Data No results found for: HGBA1C  No results found for: GLUF  Lab Results   Component Value Date    HGB 13.9 08/23/2022     Lab Results "   Component Value Date    HCT 39.7 08/23/2022     Lab Results   Component Value Date    CHOL 185 08/23/2022     Lab Results   Component Value Date    HDL 37 (L) 08/23/2022     Lab Results   Component Value Date    LDLCALC 94.2 08/23/2022     Lab Results   Component Value Date    TRIG 269 (H) 08/23/2022     Lab Results   Component Value Date    CHOLHDL 20.0 08/23/2022     Lab Results   Component Value Date    ALT 9 (L) 08/23/2022    AST 18 08/23/2022    ALKPHOS 63 08/23/2022    BILITOT 0.3 08/23/2022      Goal Improve BP, TG, & wt/BMI      D = Nutrition Diagnosis  Problem: food and nutrition related knowledge deficit  Etiology: as related to lack of prior exposure to information   Signs/symptoms: as evidenced by pt's reports, XT=015, BMI=32, elevated BP     I = Nutrition Intervention  [x] Nutrition Delivery:   Low sugar, low fat, reduced calorie diet of  ~8962-6670 kcal/day    [] Nutrition Counseling:    Collaboration to determine plan/set goals   [] Nutrition Coordination of Care:      [x] Nutrition Education:  Instructed on purpose of visit  Instructed on factors affecting BP, TG,  & weight  Instructed on importance of folic acid before pregnancy & during pregnancy  Instructed on low fat, low sugar, reduced calorie healthy diet using plate method; meal & snack schedule, healthy choices, meal replacements, beverages, food groups, daily servings, recommended serving sizes, types of fat  Instructed on physical activity recommendations    Education Materials Provided: [x] in English  [] in [place]  AVS w/instructions     M & E= Nutrition Monitoring and Evaluation  Focused visit on education and pt was receptive.      Collaborated w/pt on plan. Contact information provided, understanding verbalized, and moderate - high compliance expected.     F/U:  to be scheduled per patient       Monitoring   Indicator 1. Weight  Indicator 2. BP, TG      Evaluation  Goal 1. Weight to indicate loss   Goal 2. BP to indicate  improvement; TG to = <269     Time start:  9:45 AM  Time end:  10:15 AM  Time total:  30 minutes

## 2022-10-31 ENCOUNTER — CLINICAL SUPPORT (OUTPATIENT)
Dept: REHABILITATION | Facility: HOSPITAL | Age: 32
End: 2022-10-31
Payer: COMMERCIAL

## 2022-10-31 DIAGNOSIS — M54.12 CERVICAL RADICULOPATHY: Primary | ICD-10-CM

## 2022-10-31 DIAGNOSIS — R29.3 ABNORMAL POSTURE: ICD-10-CM

## 2022-10-31 PROCEDURE — 97110 THERAPEUTIC EXERCISES: CPT | Mod: PN

## 2022-10-31 PROCEDURE — 97140 MANUAL THERAPY 1/> REGIONS: CPT | Mod: PN

## 2022-10-31 NOTE — PROGRESS NOTES
[facility]   Physical Therapy Treatment Note     Name: Keisha Trina  [facility] Number: [id number]    Physician: Lisa Fung MD    Visit Date: 10/31/2022    Physician Orders: PT Eval and Treat   Medical Diagnosis from Referral: cervical radiculopathy  Evaluation Date: 10/5/2022  Authorization Period Expiration: 11/5/2022  Plan of Care Expiration: 11/30/2022  Progress Note Due: 11/5/2022  Visit # / Visits authorized: 5/20 (including evaluation)  FOTO: 1/3    PTA Visit #: 1/5     Time In: 8:50AM  Time Out: 9:30AM  Total Billable Time:  40 minutes    SUBJECTIVE     Pt reports:Dry Needling gave her several days of relief - she was very sore but globally improved. She is not waking up with intense nerve pain. Improved ability to fall asleep as well.  She was compliant with home exercise program.  Response to previous treatment: No complaints  Functional change: improved cervical mobility    Pain: 2/10  Location: bilateral neck, torso, and bilateral upper extremity       OBJECTIVE     Objective Measures updated at progress report unless specified.     Treatment     Keisha received the treatments listed below:      therapeutic exercises to develop strength, endurance, ROM, flexibility, posture, and core stabilization for 20 minutes including:  Supine Chin Tuck -  2 sets - 10 reps - 5 hold  Seated Scapular Retraction - 2 sets - 10 reps  Cat Cow- 2 sets - 10 reps  Thread the Needle 2x10  T-Spine Rotation with Lumbar Rock 2x10  Single Arm Row  on cable Machine 2x10 10#  Single Arm Extension  2x10 10#    NOT PREFORMED  Seated Cervical Retraction - 2 sets - 10 reps - 5 hold  Seated Upper Trapezius Stretch - 3 sets - 30 hold  Seated Levator Scapulae Stretch - 3 sets - 30 hold  Seated Scalenes Stretch -  sets - 0 reps - 0s hold     manual therapy techniques: Joint mobilizations, Manual traction, and Soft tissue Mobilization were applied to the: cervical and thoracic spine for 20 minutes, including:  Supine  position:  Soft-tisue mobilization suboccipitals, scalenes, SCM, upper trapezius, levator scapulae R>L  Manual upper trapezius stretching  Thoracic Spine CPA T1-7   Trigger Point Dry Needling 6 30x30 needles - bilateral upper trapezius (x2 needles), suboccipitals (x4) - left in situ for 5 minutes  - patient reports concordant signs with right sided needling - multiple twitch responses noted in right upper trapezius     neuromuscular re-education activities to improve: Balance, Coordination, Kinesthetic, Sense, Proprioception, and Posture for 0 minutes. The following activities were included:    therapeutic activities to improve functional performance for 0 minutes, including:    gait training to improve functional mobility and safety for 0 minutes, including:    Patient Education and Home Exercises     Home Exercises Provided and Patient Education Provided     Education provided:   - stretch techniques for muscle elongation    Written Home Exercises Provided: yes. Exercises were reviewed and Keisha was able to demonstrate them prior to the end of the session.  Keisha demonstrated good  understanding of the education provided. See EMR under Patient Instructions for exercises provided during therapy sessions    ASSESSMENT   Multiple twitch responses in needling of bilateral upper trapezius. Patient reports improved symptoms with thoracic mobilization. Added posterior chain strengthening today - patient did reports increased upper extremity warmness which is typically her precursor to increased numbness and tingling. Patient demonstrates good scapular control minimal cues needed to avoid upper trapezius activation.    Keisha Is progressing well towards her goals.   Pt prognosis is Excellent.     Pt will continue to benefit from skilled outpatient physical therapy to address the deficits listed in the problem list box on initial evaluation, provide pt/family education and maximize pt's level of independence in the home and  community environment.     Pt's spiritual, cultural and educational needs considered and pt agreeable to plan of care and goals.     Anticipated barriers to physical therapy: nonea    Goals: Short Term Goals: 4 weeks   1. Patient demonstrates independence with HEP.   2. Patient demonstrates independence with Postural Awareness.   3. Patient will improve cervical flexion to >40 degs  4. Patient will be able to raise her arms without the onset of numbness and tingling in BUE.     Long Term Goals: 8 weeks   1. Patient will be able to sleep throught the night with < 1 sleep disruption due to neck pain or numbness and tingling.  2. Patient demonstrates increased strength BUE's to 4+/5 or greater to improve tolerance to functional activities.   3. Patient demonstrates improved overall function per NDI to 25% or less.   4. Patient will improve craniocervical angle to >40 degrees in order to demonstrate improvement in FORWARD HEAD POSTURE.     PLAN     Plan of care Certification: 10/5/2022 to 11/30/2022.     Outpatient Physical Therapy 1 times weekly for 8 weeks to include the following interventions: Cervical/Lumbar Traction, Electrical Stimulation  , Manual Therapy, Moist Heat/ Ice, Neuromuscular Re-ed, Patient Education, Self Care, Therapeutic Activities, and Therapeutic Exercise.      Maira Bhardwaj, PT     Maira Bhardwaj, PT

## 2022-11-10 ENCOUNTER — CLINICAL SUPPORT (OUTPATIENT)
Dept: REHABILITATION | Facility: HOSPITAL | Age: 32
End: 2022-11-10
Payer: COMMERCIAL

## 2022-11-10 DIAGNOSIS — M54.12 CERVICAL RADICULOPATHY: Primary | ICD-10-CM

## 2022-11-10 DIAGNOSIS — R29.3 ABNORMAL POSTURE: ICD-10-CM

## 2022-11-10 PROCEDURE — 97110 THERAPEUTIC EXERCISES: CPT | Mod: PN,CQ

## 2022-11-10 NOTE — PROGRESS NOTES
"[facility]   Physical Therapy Treatment Note     Name: Keisha Trina  [facility] Number: [id number]    Physician: Lisa Fung MD    Visit Date: 11/10/2022    Physician Orders: PT Eval and Treat   Medical Diagnosis from Referral: cervical radiculopathy  Evaluation Date: 10/5/2022  Authorization Period Expiration: 11/5/2022  Plan of Care Expiration: 11/30/2022  Progress Note Due: 11/5/2022 (NEEDS TO UPDATE PROGRESS NOTE)  Visit # / Visits authorized: 5/20 (including evaluation)  FOTO: 1/3    PTA Visit #: 1/5     Time In: 100pm  Time Out: 137pm  Total Billable Time: 37  minutes    SUBJECTIVE     Pt reports: 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 So far, patient finds therapy exercises has been helpful. Patient noticed an improvement in her symptoms, however, the only action patient noticed that causes the numbness/pain/or tingling sensations is shoulder depression movement.     She was compliant with home exercise program.  Response to previous treatment: No complaints  Functional change: improved cervical mobility    Pain: 0/10  Location: bilateral neck, torso, and bilateral upper extremity       OBJECTIVE     Objective Measures updated at progress report unless specified.     Treatment     Keisha received the treatments listed below:      therapeutic exercises to develop strength, endurance, ROM, flexibility, posture, and core stabilization for 37 minutes including:  Supine Chin Tuck -  2 sets - 10 reps - 5 hold  Seated Scapular Retraction - 2 sets - 10 reps   Cat Cow- 2 sets - 10 reps  Thread the Needle 2x10  T-Spine Rotation with Lumbar Rock 2x10  Single Arm Row on cable Machine 2x10 10#   Single Arm Extension  2x10 10#  Chair thoracic extension stretch with towel rolled behind mid-upper back, 3x15" holds - added today     NOT PREFORMED  Seated Cervical Retraction - 2 sets - 10 reps - 5 hold  Seated Upper Trapezius Stretch " - 3 sets - 30 hold  Seated Levator Scapulae Stretch - 3 sets - 30 hold  Seated Scalenes Stretch -  sets - 0 reps - 0s hold     manual therapy techniques: Joint mobilizations, Manual traction, and Soft tissue Mobilization were applied to the: cervical and thoracic spine for 0 minutes, including:  Supine position:  Soft-tisue mobilization suboccipitals, scalenes, SCM, upper trapezius, levator scapulae R>L  Manual upper trapezius stretching  Thoracic Spine CPA T1-7   Trigger Point Dry Needling 6 30x30 needles - bilateral upper trapezius (x2 needles), suboccipitals (x4) - left in situ for 5 minutes  - patient reports concordant signs with right sided needling - multiple twitch responses noted in right upper trapezius     neuromuscular re-education activities to improve: Balance, Coordination, Kinesthetic, Sense, Proprioception, and Posture for 0 minutes. The following activities were included:    therapeutic activities to improve functional performance for 0 minutes, including:    gait training to improve functional mobility and safety for 0 minutes, including:    Patient Education and Home Exercises     Home Exercises Provided and Patient Education Provided     Education provided:   - stretch techniques for muscle elongation    Written Home Exercises Provided: yes. Exercises were reviewed and Keisha was able to demonstrate them prior to the end of the session.  Keisha demonstrated good  understanding of the education provided. See EMR under Patient Instructions for exercises provided during therapy sessions    ASSESSMENT   Introduced thoracic extension stretch today for increase thoracic mobility as well as targeting pectoralis muscles group. Patient responded well with all therapeutic exercises with no adverse reactions or reproduce symptoms. Will continue to work towards patient treatment goals in order for patient to return to recreational activities with minimal to no pain. Keisha Is progressing well towards her goals.    Pt prognosis is Excellent.     Pt will continue to benefit from skilled outpatient physical therapy to address the deficits listed in the problem list box on initial evaluation, provide pt/family education and maximize pt's level of independence in the home and community environment.     Pt's spiritual, cultural and educational needs considered and pt agreeable to plan of care and goals.     Anticipated barriers to physical therapy: nonea    Goals: Short Term Goals: 4 weeks   1. Patient demonstrates independence with HEP.   2. Patient demonstrates independence with Postural Awareness.   3. Patient will improve cervical flexion to >40 degs  4. Patient will be able to raise her arms without the onset of numbness and tingling in BUE.     Long Term Goals: 8 weeks   1. Patient will be able to sleep throught the night with < 1 sleep disruption due to neck pain or numbness and tingling.  2. Patient demonstrates increased strength BUE's to 4+/5 or greater to improve tolerance to functional activities.   3. Patient demonstrates improved overall function per NDI to 25% or less.   4. Patient will improve craniocervical angle to >40 degrees in order to demonstrate improvement in FORWARD HEAD POSTURE.     PLAN     Plan of care Certification: 10/5/2022 to 11/30/2022.     Outpatient Physical Therapy 1 times weekly for 8 weeks to include the following interventions: Cervical/Lumbar Traction, Electrical Stimulation  , Manual Therapy, Moist Heat/ Ice, Neuromuscular Re-ed, Patient Education, Self Care, Therapeutic Activities, and Therapeutic Exercise.      Maira Bhardwaj, PT     Kristin Ojeda, PTA

## 2022-11-11 ENCOUNTER — IMMUNIZATION (OUTPATIENT)
Dept: FAMILY MEDICINE | Facility: CLINIC | Age: 32
End: 2022-11-11
Payer: COMMERCIAL

## 2022-11-11 DIAGNOSIS — Z23 NEED FOR VACCINATION: Primary | ICD-10-CM

## 2022-11-11 PROCEDURE — 91312 COVID-19, MRNA, LNP-S, BIVALENT BOOSTER, PF, 30 MCG/0.3 ML DOSE: ICD-10-PCS | Mod: S$GLB,,, | Performed by: FAMILY MEDICINE

## 2022-11-11 PROCEDURE — 0124A COVID-19, MRNA, LNP-S, BIVALENT BOOSTER, PF, 30 MCG/0.3 ML DOSE: CPT | Mod: PBBFAC | Performed by: FAMILY MEDICINE

## 2022-11-11 PROCEDURE — 91312 COVID-19, MRNA, LNP-S, BIVALENT BOOSTER, PF, 30 MCG/0.3 ML DOSE: CPT | Mod: S$GLB,,, | Performed by: FAMILY MEDICINE

## 2022-11-14 ENCOUNTER — CLINICAL SUPPORT (OUTPATIENT)
Dept: REHABILITATION | Facility: HOSPITAL | Age: 32
End: 2022-11-14
Payer: COMMERCIAL

## 2022-11-14 DIAGNOSIS — M54.12 CERVICAL RADICULOPATHY: Primary | ICD-10-CM

## 2022-11-14 DIAGNOSIS — R29.3 ABNORMAL POSTURE: ICD-10-CM

## 2022-11-14 PROCEDURE — 97110 THERAPEUTIC EXERCISES: CPT | Mod: PN

## 2022-11-14 PROCEDURE — 97140 MANUAL THERAPY 1/> REGIONS: CPT | Mod: PN

## 2022-11-14 NOTE — PROGRESS NOTES
[facility]   Physical Therapy Treatment Note     Name: Keisha Mena  [facility] Number: [id number]    Physician: Lisa Fung MD    Visit Date: 11/14/2022    Physician Orders: PT Eval and Treat   Medical Diagnosis from Referral: cervical radiculopathy  Evaluation Date: 10/5/2022  Authorization Period Expiration: 11/5/2022  Plan of Care Expiration: 11/30/2022  Progress Note Due: 11/30/2022  Visit # / Visits authorized: 6/20 (including evaluation)  FOTO: 2/3    PTA Visit #: 1/5     Time In: 8:50AM  Time Out: 9:30  Total Billable Time: 40  minutes    SUBJECTIVE     Pt reports: Overall she has had great improvement. She no longer fears going to sleep and waking up with sever numbness and tingling. She only experiences numbness and tingling with carrying heavy objects now. She notes large improvement in posture and overall ROM. She is no longer experiencing any forearm tightness.    She was compliant with home exercise program.  Response to previous treatment: No complaints  Functional change: improved cervical mobility    Pain: 0/10  Location: bilateral neck, torso, and bilateral upper extremity       OBJECTIVE     Objective Measures updated at progress report unless specified.          CERVICAL ACTIVE RANGE OF MOTION   Flexion 60 deg   Extension 60 deg    Right Side Bend 50 deg   Left Side Bend 50 deg   Right Rotation 84 deg   Left Rotation 80 deg   Craniocervical Angle 40 deg         SPECIAL TESTS   Vertbral Artery Screen Negative   Sharps Lisa Test Negative   Alar Ligament Test Negative   Transverse Ligament Test Negative   Sprulings Test Negative   Distraction Test Positive    Deep Neck Flexor Endurance Test 7 seconds    ULNT Median Nerve Bias Negative    ULNT Ulnar Nerve Bias Negative    ULNT Radial Nerve Bias Negative   Andres Test >1 minute   Adson's Test  Negative       Treatment     Keisha received the treatments listed below:      therapeutic exercises to develop strength, endurance, ROM,  "flexibility, posture, and core stabilization for 23 minutes including:  Supine Chin Tuck -  2 sets - 10 reps - 5 hold  Seated Scapular Retraction - 2 sets - 10 reps   Cat Cow- 2 sets - 10 reps  Thread the Needle 2x10  T-Spine Rotation with Lumbar Rock 2x10  Single Arm Row on cable Machine 2x10 10#   Single Arm Extension  2x10 10#  Single Arm [place] Carry 15# 2L     NOT PREFORMED  Seated Cervical Retraction - 2 sets - 10 reps - 5 hold  Seated Upper Trapezius Stretch - 3 sets - 30 hold  Seated Levator Scapulae Stretch - 3 sets - 30 hold  Seated Scalenes Stretch -  sets - 0 reps - 0s hold   Chair thoracic extension stretch with towel rolled behind mid-upper back, 3x15" holds     manual therapy techniques: Joint mobilizations, Manual traction, and Soft tissue Mobilization were applied to the: cervical and thoracic spine for 15  minutes, including:  Supine position:  Soft-tisue mobilization suboccipitals, scalenes, SCM, upper trapezius, levator scapulae R>L  Manual upper trapezius stretching  Thoracic Spine CPA T1-7     neuromuscular re-education activities to improve: Balance, Coordination, Kinesthetic, Sense, Proprioception, and Posture for 0 minutes. The following activities were included:    therapeutic activities to improve functional performance for 0 minutes, including:    gait training to improve functional mobility and safety for 0 minutes, including:    Patient Education and Home Exercises     Home Exercises Provided and Patient Education Provided     Education provided:   - stretch techniques for muscle elongation    Written Home Exercises Provided: yes. Exercises were reviewed and Keisha was able to demonstrate them prior to the end of the session.  Keisha demonstrated good  understanding of the education provided. See EMR under Patient Instructions for exercises provided during therapy sessions    ASSESSMENT   Mrs. Mena has attended 6 out patient physical therapy to address signs and symptoms of thoracic " outlet syndrome. Mrs. Mena cervical ROM is now back to functional ROM and is painless. She is sleeping through the night without any disruptions secondary to numbness and tingling. She demonstrates increased awareness of importance of upright posture and good awareness during exercise. Patient still experiencing numbness and tingling with carrying heavy objects. Physical therapist suspects possible glenohumeral instability and posterior chain weakness as the cause. Will continue to add upper extremity strengthening and posterior chain/periscapular strengthening. Patient will be ready to d/c in 2 visits.    Keisha Is progressing well towards her goals.   Pt prognosis is Excellent.     Pt will continue to benefit from skilled outpatient physical therapy to address the deficits listed in the problem list box on initial evaluation, provide pt/family education and maximize pt's level of independence in the home and community environment.     Pt's spiritual, cultural and educational needs considered and pt agreeable to plan of care and goals.     Anticipated barriers to physical therapy: nonea    Goals: Short Term Goals: 4 weeks   1. Patient demonstrates independence with HEP. - goal met 11/14/2022  2. Patient demonstrates independence with Postural Awareness. - goal met 11/14/2022  3. Patient will improve cervical flexion to >40 degs - goal met 11/14/2022  4. Patient will be able to raise her arms without the onset of numbness and tingling in BUE. - goal met 11/14/2022     Long Term Goals: 8 weeks   1. Patient will be able to sleep throught the night with < 1 sleep disruption due to neck pain or numbness and tingling. - goal met 11/14/2022  2. Patient demonstrates increased strength BUE's to 4+/5 or greater to improve tolerance to functional activities. - goal progressing 11/14/2022  3. Patient demonstrates improved overall function per NDI to 25% or less. - goal progressing 11/14/2022  4. Patient will improve  craniocervical angle to >40 degrees in order to demonstrate improvement in FORWARD HEAD POSTURE. - goal progressing 11/14/2022    PLAN     Plan of care Certification: 10/5/2022 to 11/30/2022.     Outpatient Physical Therapy 1 times weekly for 8 weeks to include the following interventions: Cervical/Lumbar Traction, Electrical Stimulation  , Manual Therapy, Moist Heat/ Ice, Neuromuscular Re-ed, Patient Education, Self Care, Therapeutic Activities, and Therapeutic Exercise.      Maira Bhardwaj, PT     Maira Bhardwaj, PT

## 2022-11-21 ENCOUNTER — CLINICAL SUPPORT (OUTPATIENT)
Dept: REHABILITATION | Facility: HOSPITAL | Age: 32
End: 2022-11-21
Payer: COMMERCIAL

## 2022-11-21 DIAGNOSIS — M54.12 CERVICAL RADICULOPATHY: Primary | ICD-10-CM

## 2022-11-21 DIAGNOSIS — R29.3 ABNORMAL POSTURE: ICD-10-CM

## 2022-11-21 PROCEDURE — 97140 MANUAL THERAPY 1/> REGIONS: CPT | Mod: PN

## 2022-11-21 PROCEDURE — 97110 THERAPEUTIC EXERCISES: CPT | Mod: PN

## 2022-11-21 NOTE — PROGRESS NOTES
[facility]   Physical Therapy Treatment Note     Name: Keisha Trina  [facility] Number: [id number]    Physician: Lisa Fung MD    Visit Date: 11/21/2022    Physician Orders: PT Eval and Treat   Medical Diagnosis from Referral: cervical radiculopathy  Evaluation Date: 10/5/2022  Authorization Period Expiration: 11/5/2022  Plan of Care Expiration: 11/30/2022  Progress Note Due: 11/30/2022  Visit # / Visits authorized: 7 / 20 (including evaluation)  FOTO: 2/3    PTA Visit #: 1/5     Time In: 1:05PM  Time Out: 1:45PM  Total Billable Time: 40  minutes    SUBJECTIVE     Pt reports: She has an increased numbness and tingling over the last 3 days - preventing her from sleeping. She reports her left arm feels likes its in a refrigerator. Her son has been up every night due to being sick.      She was compliant with home exercise program.  Response to previous treatment: No complaints  Functional change: improved cervical mobility    Pain: 6/10  Location: bilateral neck, torso, and bilateral upper extremity       OBJECTIVE     Objective Measures updated at progress report unless specified.      Treatment     Keisha received the treatments listed below:      therapeutic exercises to develop strength, endurance, ROM, flexibility, posture, and core stabilization for 15 minutes including:  Supine Chin Tuck -  2 sets - 10 reps - 5 hold  Open Books 2x10   Seated Scapular Retraction - 2 sets - 10 reps   Cat Cow- 2 sets - 10 reps  Thread the Needle 2x10  T-Spine Rotation with Lumbar Rock 2x10  Seated Cervical Retraction - 2 sets - 10 reps - 5 hold  Seated Upper Trapezius Stretch - 3 sets - 30 hold  Seated Levator Scapulae Stretch - 3 sets - 30 hold    NOT PREFORMED  Single Arm Row on cable Machine 2x10 10#   Single Arm Extension  2x10 10#  Single Arm [place] Carry 15# 2L     NOT PREFORMED  Seated Scalenes Stretch -  sets - 0 reps - 0s hold   Chair thoracic extension stretch with towel rolled behind mid-upper  "back, 3x15" holds     manual therapy techniques: Joint mobilizations, Manual traction, and Soft tissue Mobilization were applied to the: cervical and thoracic spine for 23 minutes, including:  Supine position:  Soft-tisue mobilization suboccipitals, scalenes, SCM, upper trapezius, levator scapulae left>right   Manual upper trapezius stretching  Thoracic Spine CPA T1-7   1st Rib Mobilization Left     neuromuscular re-education activities to improve: Balance, Coordination, Kinesthetic, Sense, Proprioception, and Posture for 0 minutes. The following activities were included:    therapeutic activities to improve functional performance for 0 minutes, including:    gait training to improve functional mobility and safety for 0 minutes, including:    Patient Education and Home Exercises     Home Exercises Provided and Patient Education Provided     Education provided:   - stretch techniques for muscle elongation    Written Home Exercises Provided: yes. Exercises were reviewed and Keisha was able to demonstrate them prior to the end of the session.  Keisha demonstrated good  understanding of the education provided. See EMR under Patient Instructions for exercises provided during therapy sessions    ASSESSMENT   Patient with set back with numbness and tingling today - she cannot recall a mechanism of onset. Able to decreased numbness and tingling and cold feeling with manual therapy. Encouraged patient to continue with basic home exercise program.    Keisha Is progressing well towards her goals.   Pt prognosis is Excellent.     Pt will continue to benefit from skilled outpatient physical therapy to address the deficits listed in the problem list box on initial evaluation, provide pt/family education and maximize pt's level of independence in the home and community environment.     Pt's spiritual, cultural and educational needs considered and pt agreeable to plan of care and goals.     Anticipated barriers to physical therapy: " nonea    Goals: Short Term Goals: 4 weeks   1. Patient demonstrates independence with HEP. - goal met 11/14/2022  2. Patient demonstrates independence with Postural Awareness. - goal met 11/14/2022  3. Patient will improve cervical flexion to >40 degs - goal met 11/14/2022  4. Patient will be able to raise her arms without the onset of numbness and tingling in BUE. - goal met 11/14/2022     Long Term Goals: 8 weeks   1. Patient will be able to sleep throught the night with < 1 sleep disruption due to neck pain or numbness and tingling. - goal met 11/14/2022  2. Patient demonstrates increased strength BUE's to 4+/5 or greater to improve tolerance to functional activities. - goal progressing 11/14/2022  3. Patient demonstrates improved overall function per NDI to 25% or less. - goal progressing 11/14/2022  4. Patient will improve craniocervical angle to >40 degrees in order to demonstrate improvement in FORWARD HEAD POSTURE. - goal progressing 11/14/2022    PLAN     Plan of care Certification: 10/5/2022 to 11/30/2022.     Outpatient Physical Therapy 1 times weekly for 8 weeks to include the following interventions: Cervical/Lumbar Traction, Electrical Stimulation  , Manual Therapy, Moist Heat/ Ice, Neuromuscular Re-ed, Patient Education, Self Care, Therapeutic Activities, and Therapeutic Exercise.      Maira Bhardwaj, PT     Maira Bhardwaj, PT

## 2022-11-30 ENCOUNTER — CLINICAL SUPPORT (OUTPATIENT)
Dept: REHABILITATION | Facility: HOSPITAL | Age: 32
End: 2022-11-30
Payer: COMMERCIAL

## 2022-11-30 DIAGNOSIS — M54.12 CERVICAL RADICULOPATHY: Primary | ICD-10-CM

## 2022-11-30 DIAGNOSIS — R29.3 ABNORMAL POSTURE: ICD-10-CM

## 2022-11-30 PROCEDURE — 97110 THERAPEUTIC EXERCISES: CPT | Mod: PN

## 2022-11-30 NOTE — PROGRESS NOTES
[facility]   Physical Therapy Discharge Note     Name: Keisha Trina  [facility] Number: [id number]    Physician: Lisa Fung MD    Visit Date: 11/30/2022    Physician Orders: PT Eval and Treat   Medical Diagnosis from Referral: cervical radiculopathy  Evaluation Date: 10/5/2022  Authorization Period Expiration: 11/5/2022  Plan of Care Expiration: 11/30/2022  Progress Note Due: 11/30/2022  Visit # / Visits authorized: 8 / 20 (including evaluation)  FOTO: 2/3    PTA Visit #: 1/5     Time In: 8:45AM  Time Out: 9:00AM  Total Billable Time: 15  minutes    SUBJECTIVE     Pt reports: She is doing a lot better - she thinks last week was just a flare up. The cold and tingling feeling went away just hours after coming to physical therapy. She has trialed sleeping on a new bed and that helped significantly.     She was compliant with home exercise program.  Response to previous treatment: No complaints  Functional change: improved cervical mobility    Pain: 0/10  Location: bilateral neck, torso, and bilateral upper extremity       OBJECTIVE     Objective Measures updated at progress report unless specified.         CERVICAL ACTIVE RANGE OF MOTION   Flexion 60 deg   Extension 60 deg    Right Side Bend 50 deg   Left Side Bend 50 deg   Right Rotation 84 deg   Left Rotation 80 deg   Craniocervical Angle 40 deg            SPECIAL TESTS   Vertbral Artery Screen Negative   Sharps Lisa Test Negative   Alar Ligament Test Negative   Transverse Ligament Test Negative   Sprulings Test Negative   Distraction Test Positive    Deep Neck Flexor Endurance Test 20 seconds    ULNT Median Nerve Bias Negative    ULNT Ulnar Nerve Bias Negative    ULNT Radial Nerve Bias Negative   Andres Test >1 minute   Adson's Test  Negative    c  Treatment     Keisha received the treatments listed below:      therapeutic exercises to develop strength, endurance, ROM, flexibility, posture, and core stabilization for 15 minutes  "including:  Above objective measurements  Review maintenance home exercise program     NOT PREFORMED  Single Arm Row on cable Machine 2x10 10#   Single Arm Extension  2x10 10#  Single Arm [place] Carry 15# 2L   Supine Chin Tuck -  2 sets - 10 reps - 5 hold  Open Books 2x10   Seated Scapular Retraction - 2 sets - 10 reps   Cat Cow- 2 sets - 10 reps  Thread the Needle 2x10  T-Spine Rotation with Lumbar Rock 2x10  Seated Cervical Retraction - 2 sets - 10 reps - 5 hold  Seated Upper Trapezius Stretch - 3 sets - 30 hold  Seated Levator Scapulae Stretch - 3 sets - 30 hold    NOT PREFORMED  Seated Scalenes Stretch -  sets - 0 reps - 0s hold   Chair thoracic extension stretch with towel rolled behind mid-upper back, 3x15" holds     manual therapy techniques: Joint mobilizations, Manual traction, and Soft tissue Mobilization were applied to the: cervical and thoracic spine for 0 minutes, including:  Supine position:  Soft-tisue mobilization suboccipitals, scalenes, SCM, upper trapezius, levator scapulae left>right   Manual upper trapezius stretching  Thoracic Spine CPA T1-7   1st Rib Mobilization Left     neuromuscular re-education activities to improve: Balance, Coordination, Kinesthetic, Sense, Proprioception, and Posture for 0 minutes. The following activities were included:    therapeutic activities to improve functional performance for 0 minutes, including:    gait training to improve functional mobility and safety for 0 minutes, including:    Patient Education and Home Exercises     Home Exercises Provided and Patient Education Provided     Education provided:   - stretch techniques for muscle elongation    Written Home Exercises Provided: yes. Exercises were reviewed and Keisha was able to demonstrate them prior to the end of the session.  Keisha demonstrated good  understanding of the education provided. See EMR under Patient Instructions for exercises provided during therapy sessions    ASSESSMENT   Patient is no longer " experiencing numbness and tingling and is now able to sleep through the night with increased ease - not woken up secondary to pain. Cervical ROM restored to normal limitations. Patient advised to continue with home exercise program maintenance in order to maintain therapeutic improvements. Educated patient to return to physical therapy if there is a change in status. Patient discharged at this time.     Keisha Is progressing well towards her goals.   Pt prognosis is Excellent.     Pt will continue to benefit from skilled outpatient physical therapy to address the deficits listed in the problem list box on initial evaluation, provide pt/family education and maximize pt's level of independence in the home and community environment.     Pt's spiritual, cultural and educational needs considered and pt agreeable to plan of care and goals.     Anticipated barriers to physical therapy: nonea    Goals: Short Term Goals: 4 weeks   1. Patient demonstrates independence with HEP. - goal met 11/14/2022  2. Patient demonstrates independence with Postural Awareness. - goal met 11/14/2022  3. Patient will improve cervical flexion to >40 degs - goal met 11/14/2022  4. Patient will be able to raise her arms without the onset of numbness and tingling in BUE. - goal met 11/14/2022     Long Term Goals: 8 weeks   1. Patient will be able to sleep throught the night with < 1 sleep disruption due to neck pain or numbness and tingling. - goal met 11/14/2022  2. Patient demonstrates increased strength BUE's to 4+/5 or greater to improve tolerance to functional activities. - goal met 11/30/2022  3. Patient demonstrates improved overall function per NDI to 25% or less.- goal met 11/30/2022  4. Patient will improve craniocervical angle to >40 degrees in order to demonstrate improvement in FORWARD HEAD POSTURE. - goal met 11/30/2022    PLAN   DISCHARGE  Plan of care Certification: 10/5/2022 to 11/30/2022.     Outpatient Physical Therapy 1 times  weekly for 8 weeks to include the following interventions: Cervical/Lumbar Traction, Electrical Stimulation  , Manual Therapy, Moist Heat/ Ice, Neuromuscular Re-ed, Patient Education, Self Care, Therapeutic Activities, and Therapeutic Exercise.      Maira Bhardwaj, PT     Maira Bhardwaj, PT

## 2022-12-14 ENCOUNTER — OFFICE VISIT (OUTPATIENT)
Dept: OBSTETRICS AND GYNECOLOGY | Facility: CLINIC | Age: 32
End: 2022-12-14
Payer: COMMERCIAL

## 2022-12-14 VITALS
DIASTOLIC BLOOD PRESSURE: 80 MMHG | BODY MASS INDEX: 31.87 KG/M2 | HEIGHT: 61 IN | SYSTOLIC BLOOD PRESSURE: 124 MMHG | HEART RATE: 74 BPM | WEIGHT: 168.81 LBS | RESPIRATION RATE: 15 BRPM

## 2022-12-14 DIAGNOSIS — N89.8 VAGINAL ITCHING: Primary | ICD-10-CM

## 2022-12-14 PROCEDURE — 99213 OFFICE O/P EST LOW 20 MIN: CPT | Mod: S$GLB,,, | Performed by: OBSTETRICS & GYNECOLOGY

## 2022-12-14 PROCEDURE — 3008F PR BODY MASS INDEX (BMI) DOCUMENTED: ICD-10-PCS | Mod: CPTII,S$GLB,, | Performed by: OBSTETRICS & GYNECOLOGY

## 2022-12-14 PROCEDURE — 99999 PR PBB SHADOW E&M-EST. PATIENT-LVL III: ICD-10-PCS | Mod: PBBFAC,,, | Performed by: OBSTETRICS & GYNECOLOGY

## 2022-12-14 PROCEDURE — 3008F BODY MASS INDEX DOCD: CPT | Mod: CPTII,S$GLB,, | Performed by: OBSTETRICS & GYNECOLOGY

## 2022-12-14 PROCEDURE — 81514 NFCT DS BV&VAGINITIS DNA ALG: CPT | Performed by: OBSTETRICS & GYNECOLOGY

## 2022-12-14 PROCEDURE — 3074F PR MOST RECENT SYSTOLIC BLOOD PRESSURE < 130 MM HG: ICD-10-PCS | Mod: CPTII,S$GLB,, | Performed by: OBSTETRICS & GYNECOLOGY

## 2022-12-14 PROCEDURE — 99999 PR PBB SHADOW E&M-EST. PATIENT-LVL III: CPT | Mod: PBBFAC,,, | Performed by: OBSTETRICS & GYNECOLOGY

## 2022-12-14 PROCEDURE — 1159F MED LIST DOCD IN RCRD: CPT | Mod: CPTII,S$GLB,, | Performed by: OBSTETRICS & GYNECOLOGY

## 2022-12-14 PROCEDURE — 3074F SYST BP LT 130 MM HG: CPT | Mod: CPTII,S$GLB,, | Performed by: OBSTETRICS & GYNECOLOGY

## 2022-12-14 PROCEDURE — 3079F DIAST BP 80-89 MM HG: CPT | Mod: CPTII,S$GLB,, | Performed by: OBSTETRICS & GYNECOLOGY

## 2022-12-14 PROCEDURE — 99213 PR OFFICE/OUTPT VISIT, EST, LEVL III, 20-29 MIN: ICD-10-PCS | Mod: S$GLB,,, | Performed by: OBSTETRICS & GYNECOLOGY

## 2022-12-14 PROCEDURE — 1159F PR MEDICATION LIST DOCUMENTED IN MEDICAL RECORD: ICD-10-PCS | Mod: CPTII,S$GLB,, | Performed by: OBSTETRICS & GYNECOLOGY

## 2022-12-14 PROCEDURE — 3079F PR MOST RECENT DIASTOLIC BLOOD PRESSURE 80-89 MM HG: ICD-10-PCS | Mod: CPTII,S$GLB,, | Performed by: OBSTETRICS & GYNECOLOGY

## 2022-12-14 RX ORDER — FLUCONAZOLE 150 MG/1
150 TABLET ORAL ONCE
Qty: 1 TABLET | Refills: 1 | Status: SHIPPED | OUTPATIENT
Start: 2022-12-14 | End: 2022-12-14

## 2022-12-14 NOTE — PROGRESS NOTES
Subjective:    Patient ID: Keisha Mena is a 32 y.o. y.o. female.     Chief Complaint:   Chief Complaint   Patient presents with    Vaginal Itching       History of Present Illness   Keisha presents today complaining of local irritation and itching . Symptoms began 2  weeks and have been persistent.  Patient's last menstrual period was 12/09/2022.. She also denies discharge and abdominal pain. She is sexually active.  She was also treated for a UTI 2 weeks ago.    ROS:   CONSTITUTIONAL: Negative for fever, chills, diaphoresis, weakness, fatigue, weight loss, weight gain  GASTROINTESTINAL: negative for abdominal pain, flank pain, nausea, vomiting, diarrhea, constipation, black stool, blood in stool  BREAST: negative for breast  tenderness, breast mass, nipple discharge, or skin changes  GENITOURINARY: vaginal irritation, negative for dysuria, frequency/urgency, hematuria, genital discharge, vaginal bleeding, irregular menses, heavy menses, pelvic pain  HEMATOLOGIC/LYMPHATIC: negative for swollen lymph nodes, bleeding, bruising  MUSCULOSKELETAL: negative for back pain, joint pain, joint stiffness, joint swelling, muscle pain, muscle weakness  ENDOCRINE: negative for polydipsia/polyuria, palpitations, skin changes, temperature intolerance, unexpected weight changes      Objective:    Vital Signs:  Vitals:    12/14/22 1253   BP: 124/80   Pulse: 74   Resp: 15       Physical Exam:  General:  alert, cooperative, no distress   Neuro/Psych: AAOx3, appropriate mood and affect   Head: Normocephalic, atraumatic   Neck: Supple, normal ROM   Resp: Normal effort   Skin:  Skin color, texture, turgor normal. No rashes or lesions   Abdomen:   Soft, NT   Pelvis: External genitalia: normal general appearance  Urinary system: urethral meatus normal, bladder nontender  Vaginal: normal mucosa without prolapse or lesions, excoriation  posterior vagina  Cervix: normal appearance  Uterus: normal size, shape, position  Adnexa: normal size,  nontender bilaterally         Assessment:      1. Vaginal itching          Plan:      Vaginal itching  -     POCT URINE DIPSTICK WITHOUT MICROSCOPE  -     fluconazole (DIFLUCAN) 150 MG Tab; Take 1 tablet (150 mg total) by mouth once. for 1 dose  Dispense: 1 tablet; Refill: 1  -     Vaginosis Screen by DNA Probe

## 2022-12-16 LAB
BACTERIAL VAGINOSIS DNA: NEGATIVE
CANDIDA GLABRATA DNA: NEGATIVE
CANDIDA KRUSEI DNA: NEGATIVE
CANDIDA RRNA VAG QL PROBE: POSITIVE
T VAGINALIS RRNA GENITAL QL PROBE: NEGATIVE

## 2023-02-07 ENCOUNTER — OFFICE VISIT (OUTPATIENT)
Dept: INTERNAL MEDICINE | Facility: CLINIC | Age: 33
End: 2023-02-07
Payer: COMMERCIAL

## 2023-02-07 VITALS
OXYGEN SATURATION: 99 % | HEART RATE: 75 BPM | BODY MASS INDEX: 31.67 KG/M2 | RESPIRATION RATE: 16 BRPM | DIASTOLIC BLOOD PRESSURE: 88 MMHG | TEMPERATURE: 99 F | HEIGHT: 61 IN | SYSTOLIC BLOOD PRESSURE: 114 MMHG | WEIGHT: 167.75 LBS

## 2023-02-07 DIAGNOSIS — J06.9 VIRAL URI WITH COUGH: Primary | ICD-10-CM

## 2023-02-07 LAB
CTP QC/QA: YES
CTP QC/QA: YES
POC MOLECULAR INFLUENZA A AGN: NEGATIVE
POC MOLECULAR INFLUENZA B AGN: NEGATIVE
SARS-COV-2 AG RESP QL IA.RAPID: NEGATIVE

## 2023-02-07 PROCEDURE — 99999 PR PBB SHADOW E&M-EST. PATIENT-LVL III: CPT | Mod: PBBFAC,,, | Performed by: INTERNAL MEDICINE

## 2023-02-07 PROCEDURE — 1160F PR REVIEW ALL MEDS BY PRESCRIBER/CLIN PHARMACIST DOCUMENTED: ICD-10-PCS | Mod: CPTII,S$GLB,, | Performed by: INTERNAL MEDICINE

## 2023-02-07 PROCEDURE — 96372 PR INJECTION,THERAP/PROPH/DIAG2ST, IM OR SUBCUT: ICD-10-PCS | Mod: S$GLB,,, | Performed by: INTERNAL MEDICINE

## 2023-02-07 PROCEDURE — 1159F MED LIST DOCD IN RCRD: CPT | Mod: CPTII,S$GLB,, | Performed by: INTERNAL MEDICINE

## 2023-02-07 PROCEDURE — 87502 INFLUENZA DNA AMP PROBE: CPT | Mod: QW,S$GLB,, | Performed by: INTERNAL MEDICINE

## 2023-02-07 PROCEDURE — 99213 PR OFFICE/OUTPT VISIT, EST, LEVL III, 20-29 MIN: ICD-10-PCS | Mod: 25,S$GLB,, | Performed by: INTERNAL MEDICINE

## 2023-02-07 PROCEDURE — 3074F SYST BP LT 130 MM HG: CPT | Mod: CPTII,S$GLB,, | Performed by: INTERNAL MEDICINE

## 2023-02-07 PROCEDURE — 87502 POCT INFLUENZA A/B MOLECULAR: ICD-10-PCS | Mod: QW,S$GLB,, | Performed by: INTERNAL MEDICINE

## 2023-02-07 PROCEDURE — 3074F PR MOST RECENT SYSTOLIC BLOOD PRESSURE < 130 MM HG: ICD-10-PCS | Mod: CPTII,S$GLB,, | Performed by: INTERNAL MEDICINE

## 2023-02-07 PROCEDURE — 99999 PR PBB SHADOW E&M-EST. PATIENT-LVL III: ICD-10-PCS | Mod: PBBFAC,,, | Performed by: INTERNAL MEDICINE

## 2023-02-07 PROCEDURE — 1159F PR MEDICATION LIST DOCUMENTED IN MEDICAL RECORD: ICD-10-PCS | Mod: CPTII,S$GLB,, | Performed by: INTERNAL MEDICINE

## 2023-02-07 PROCEDURE — U0002 COVID-19 LAB TEST NON-CDC: HCPCS | Mod: QW,S$GLB,, | Performed by: INTERNAL MEDICINE

## 2023-02-07 PROCEDURE — 3008F PR BODY MASS INDEX (BMI) DOCUMENTED: ICD-10-PCS | Mod: CPTII,S$GLB,, | Performed by: INTERNAL MEDICINE

## 2023-02-07 PROCEDURE — 3079F DIAST BP 80-89 MM HG: CPT | Mod: CPTII,S$GLB,, | Performed by: INTERNAL MEDICINE

## 2023-02-07 PROCEDURE — 96372 THER/PROPH/DIAG INJ SC/IM: CPT | Mod: S$GLB,,, | Performed by: INTERNAL MEDICINE

## 2023-02-07 PROCEDURE — 1160F RVW MEDS BY RX/DR IN RCRD: CPT | Mod: CPTII,S$GLB,, | Performed by: INTERNAL MEDICINE

## 2023-02-07 PROCEDURE — 99213 OFFICE O/P EST LOW 20 MIN: CPT | Mod: 25,S$GLB,, | Performed by: INTERNAL MEDICINE

## 2023-02-07 PROCEDURE — 3008F BODY MASS INDEX DOCD: CPT | Mod: CPTII,S$GLB,, | Performed by: INTERNAL MEDICINE

## 2023-02-07 PROCEDURE — U0002 SARS CORONAVIRUS 2 ANTIGEN POCT: ICD-10-PCS | Mod: QW,S$GLB,, | Performed by: INTERNAL MEDICINE

## 2023-02-07 PROCEDURE — 3079F PR MOST RECENT DIASTOLIC BLOOD PRESSURE 80-89 MM HG: ICD-10-PCS | Mod: CPTII,S$GLB,, | Performed by: INTERNAL MEDICINE

## 2023-02-07 RX ORDER — METHYLPREDNISOLONE ACETATE 40 MG/ML
40 INJECTION, SUSPENSION INTRA-ARTICULAR; INTRALESIONAL; INTRAMUSCULAR; SOFT TISSUE
Status: COMPLETED | OUTPATIENT
Start: 2023-02-07 | End: 2023-02-07

## 2023-02-07 RX ADMIN — METHYLPREDNISOLONE ACETATE 40 MG: 40 INJECTION, SUSPENSION INTRA-ARTICULAR; INTRALESIONAL; INTRAMUSCULAR; SOFT TISSUE at 01:02

## 2023-02-07 NOTE — PROGRESS NOTES
"Subjective:       Patient ID: Keisha Mena is a 32 y.o. female.    Chief Complaint: Chest Congestion, Cough, Nasal Congestion, Fever, Generalized Body Aches, Fatigue, Headache, Nausea, Sore Throat, and Otalgia      HPI:    Patient is known to me and presents with cough and congestion. Sx started several days ago. + sore throat. + laryngitis. Intermittent ear pain, b/l but pain is switching side. Tmax 99.9 F. Cough productive of green yellow sputum. + sneezing. Her son had ear infection last week. Coughing "take a lot of energy" but not necessarily short of breath.       Past Medical History:   Diagnosis Date    Anxiety     Female bladder prolapse     Hypertension        Family History   Problem Relation Age of Onset    Hypertension Mother     Alcohol abuse Father         history of s/p liver transplant    Breast cancer Neg Hx     Colon cancer Neg Hx     Ovarian cancer Neg Hx        Social History     Socioeconomic History    Marital status:    Tobacco Use    Smoking status: Never    Smokeless tobacco: Never   Substance and Sexual Activity    Alcohol use: No    Drug use: No    Sexual activity: Yes     Partners: Male     Birth control/protection: None     Comment:        Review of Systems   Constitutional:  Positive for fatigue and fever. Negative for activity change and unexpected weight change.   HENT:  Positive for congestion, ear pain, sore throat and voice change. Negative for hearing loss and rhinorrhea.    Eyes:  Negative for redness and visual disturbance.   Respiratory:  Positive for cough and chest tightness. Negative for shortness of breath and wheezing.    Cardiovascular:  Negative for chest pain, palpitations and leg swelling.   Gastrointestinal:  Negative for abdominal pain, constipation, diarrhea, nausea and vomiting.   Genitourinary:  Negative for dysuria, frequency and urgency.   Musculoskeletal:  Negative for back pain, joint swelling and neck pain.   Skin:  Negative for color change, " rash and wound.   Neurological:  Negative for dizziness, tremors, weakness, light-headedness and headaches.       Objective:      Physical Exam  Vitals reviewed.   Constitutional:       General: She is not in acute distress.     Appearance: She is well-developed.   HENT:      Head: Normocephalic and atraumatic.      Comments: Dull TM b/l but not retraction, bulging     Right Ear: External ear normal.      Left Ear: External ear normal.      Nose: Congestion present.      Mouth/Throat:      Mouth: Mucous membranes are moist.      Pharynx: Posterior oropharyngeal erythema present. No oropharyngeal exudate.   Eyes:      General:         Right eye: No discharge.         Left eye: No discharge.      Extraocular Movements: Extraocular movements intact.      Conjunctiva/sclera: Conjunctivae normal.      Pupils: Pupils are equal, round, and reactive to light.   Neck:      Thyroid: No thyromegaly.   Cardiovascular:      Rate and Rhythm: Normal rate and regular rhythm.      Heart sounds: No murmur heard.  Pulmonary:      Effort: Pulmonary effort is normal. No respiratory distress.      Breath sounds: Normal breath sounds. No wheezing or rales.   Abdominal:      Palpations: Abdomen is soft.      Tenderness: There is no abdominal tenderness.   Skin:     General: Skin is warm and dry.   Neurological:      Mental Status: She is alert and oriented to person, place, and time.      Cranial Nerves: No cranial nerve deficit.   Psychiatric:         Behavior: Behavior normal.         Thought Content: Thought content normal.       Assessment:       1. Viral URI with cough          Plan:       1. Viral URI with cough    -     SARS Coronavirus 2 Antigen, POCT  -     POCT Influenza A/B Molecular  -     methylPREDNISolone acetate injection 40 mg       Flu and covid negative  Add mucinex  Add robitussin  Saline nasal spray and/or sinus wash  Rest. Stay hydrated  Call for new or worsening sx

## 2023-03-22 ENCOUNTER — OFFICE VISIT (OUTPATIENT)
Dept: INTERNAL MEDICINE | Facility: CLINIC | Age: 33
End: 2023-03-22
Payer: COMMERCIAL

## 2023-03-22 VITALS
BODY MASS INDEX: 31.67 KG/M2 | HEART RATE: 96 BPM | DIASTOLIC BLOOD PRESSURE: 76 MMHG | OXYGEN SATURATION: 100 % | SYSTOLIC BLOOD PRESSURE: 122 MMHG | WEIGHT: 167.75 LBS | HEIGHT: 61 IN | RESPIRATION RATE: 18 BRPM

## 2023-03-22 DIAGNOSIS — E66.09 CLASS 1 OBESITY DUE TO EXCESS CALORIES WITH SERIOUS COMORBIDITY AND BODY MASS INDEX (BMI) OF 32.0 TO 32.9 IN ADULT: ICD-10-CM

## 2023-03-22 DIAGNOSIS — K21.9 GASTROESOPHAGEAL REFLUX DISEASE WITHOUT ESOPHAGITIS: ICD-10-CM

## 2023-03-22 DIAGNOSIS — R00.2 HEART PALPITATIONS: Primary | ICD-10-CM

## 2023-03-22 PROCEDURE — 3008F PR BODY MASS INDEX (BMI) DOCUMENTED: ICD-10-PCS | Mod: CPTII,S$GLB,, | Performed by: INTERNAL MEDICINE

## 2023-03-22 PROCEDURE — 99214 OFFICE O/P EST MOD 30 MIN: CPT | Mod: S$GLB,,, | Performed by: INTERNAL MEDICINE

## 2023-03-22 PROCEDURE — 1159F PR MEDICATION LIST DOCUMENTED IN MEDICAL RECORD: ICD-10-PCS | Mod: CPTII,S$GLB,, | Performed by: INTERNAL MEDICINE

## 2023-03-22 PROCEDURE — 3008F BODY MASS INDEX DOCD: CPT | Mod: CPTII,S$GLB,, | Performed by: INTERNAL MEDICINE

## 2023-03-22 PROCEDURE — 3078F PR MOST RECENT DIASTOLIC BLOOD PRESSURE < 80 MM HG: ICD-10-PCS | Mod: CPTII,S$GLB,, | Performed by: INTERNAL MEDICINE

## 2023-03-22 PROCEDURE — 3078F DIAST BP <80 MM HG: CPT | Mod: CPTII,S$GLB,, | Performed by: INTERNAL MEDICINE

## 2023-03-22 PROCEDURE — 99999 PR PBB SHADOW E&M-EST. PATIENT-LVL III: ICD-10-PCS | Mod: PBBFAC,,, | Performed by: INTERNAL MEDICINE

## 2023-03-22 PROCEDURE — 3074F SYST BP LT 130 MM HG: CPT | Mod: CPTII,S$GLB,, | Performed by: INTERNAL MEDICINE

## 2023-03-22 PROCEDURE — 1160F PR REVIEW ALL MEDS BY PRESCRIBER/CLIN PHARMACIST DOCUMENTED: ICD-10-PCS | Mod: CPTII,S$GLB,, | Performed by: INTERNAL MEDICINE

## 2023-03-22 PROCEDURE — 1160F RVW MEDS BY RX/DR IN RCRD: CPT | Mod: CPTII,S$GLB,, | Performed by: INTERNAL MEDICINE

## 2023-03-22 PROCEDURE — 3074F PR MOST RECENT SYSTOLIC BLOOD PRESSURE < 130 MM HG: ICD-10-PCS | Mod: CPTII,S$GLB,, | Performed by: INTERNAL MEDICINE

## 2023-03-22 PROCEDURE — 99999 PR PBB SHADOW E&M-EST. PATIENT-LVL III: CPT | Mod: PBBFAC,,, | Performed by: INTERNAL MEDICINE

## 2023-03-22 PROCEDURE — 99214 PR OFFICE/OUTPT VISIT, EST, LEVL IV, 30-39 MIN: ICD-10-PCS | Mod: S$GLB,,, | Performed by: INTERNAL MEDICINE

## 2023-03-22 PROCEDURE — 1159F MED LIST DOCD IN RCRD: CPT | Mod: CPTII,S$GLB,, | Performed by: INTERNAL MEDICINE

## 2023-03-22 NOTE — PROGRESS NOTES
"Subjective:       Patient ID: Keisha Mena is a 32 y.o. female.    Chief Complaint: Follow-up and heart flutters      HPI:  Patient is known to me and presents to discuss heart palpitations. Sx started 2 months ago. Sx are intermittent. Sx are occurring mostly at night. Waking her up from sleep. She is feeling her heart race and her  is a nurse and checks-told her it was fast and then "drops down and was super thready" heart fluttering lasts for a few seconds but gets a few episodes lasting for 1-2 mins of fluttering. Does not feel dizzy/lightheaded but does not get out of bed during these spells. Has had 3-4 episodes over last 2 months.   + snoring  BMI 31    She has also noticed increased gas and heartburn symtpoms. Taking TUMS but sx are persistent. Occurring daily now. No stomach pain. She is refluxing to point of having near regurgitation.     Past Medical History:   Diagnosis Date    Anxiety     Female bladder prolapse     Hypertension        Family History   Problem Relation Age of Onset    Hypertension Mother     Alcohol abuse Father         history of s/p liver transplant    Breast cancer Neg Hx     Colon cancer Neg Hx     Ovarian cancer Neg Hx        Social History     Socioeconomic History    Marital status:    Tobacco Use    Smoking status: Never    Smokeless tobacco: Never   Substance and Sexual Activity    Alcohol use: No    Drug use: No    Sexual activity: Yes     Partners: Male     Birth control/protection: None     Comment:        Review of Systems   Constitutional:  Negative for activity change, fatigue, fever and unexpected weight change.   HENT:  Negative for congestion, ear pain, hearing loss, rhinorrhea and sore throat.    Eyes:  Negative for redness and visual disturbance.   Respiratory:  Negative for cough, shortness of breath and wheezing.    Cardiovascular:  Positive for palpitations. Negative for chest pain and leg swelling.   Gastrointestinal:  Negative for abdominal " pain, constipation, diarrhea, nausea and vomiting.        Reflux     Genitourinary:  Negative for dysuria, frequency and urgency.   Musculoskeletal:  Negative for back pain, joint swelling and neck pain.   Skin:  Negative for color change, rash and wound.   Neurological:  Negative for dizziness, tremors, weakness, light-headedness and headaches.       Objective:      Physical Exam  Vitals reviewed.   Constitutional:       General: She is not in acute distress.     Appearance: She is well-developed.   HENT:      Head: Normocephalic and atraumatic.      Right Ear: External ear normal.      Left Ear: External ear normal.      Nose: Nose normal.   Eyes:      General:         Right eye: No discharge.         Left eye: No discharge.      Extraocular Movements: Extraocular movements intact.      Conjunctiva/sclera: Conjunctivae normal.      Pupils: Pupils are equal, round, and reactive to light.   Neck:      Thyroid: No thyromegaly.   Cardiovascular:      Rate and Rhythm: Normal rate and regular rhythm.      Heart sounds: No murmur heard.  Pulmonary:      Effort: Pulmonary effort is normal. No respiratory distress.      Breath sounds: Normal breath sounds. No wheezing.   Skin:     General: Skin is warm and dry.   Neurological:      Mental Status: She is alert and oriented to person, place, and time.      Cranial Nerves: No cranial nerve deficit.   Psychiatric:         Behavior: Behavior normal.         Thought Content: Thought content normal.       Assessment:       1. Heart palpitations    2. Class 1 obesity due to excess calories with serious comorbidity and body mass index (BMI) of 32.0 to 32.9 in adult        Plan:       1. Heart palpitations  New problem  Waking her up from sleep which is concerning to me but sx are intermittent, 3-4 x in 2 months  Sleep study: + obesity, + snoring. I think having apenic episodes leading to awakening and palpitatons  Check labs for other etiology  If wornseing in frequency, intensity  prior to sleep study will send to cards to discuss heart monitor  Stay hydrated  Already avoiding caffeine  -     Polysomnography 4 or more parameters; Future  -     Comprehensive Metabolic Panel; Future; Expected date: 03/22/2023  -     Magnesium; Future; Expected date: 03/22/2023  -     PHOSPHORUS; Future; Expected date: 03/22/2023  -     CBC Auto Differential; Future; Expected date: 03/22/2023    2. Class 1 obesity due to excess calories with serious comorbidity and body mass index (BMI) of 32.0 to 32.9 in adult  Diet, exercise, weight loss  Concern for sleep apnea with palpitations and snoring  -     Polysomnography 4 or more parameters; Future    3. Gastroesophageal reflux disease without esophagitis  Start OTC pepcid night, can increase to BID if needed  Diet changes discussed    RTC as dominic and PRN

## 2023-03-23 ENCOUNTER — LAB VISIT (OUTPATIENT)
Dept: LAB | Facility: HOSPITAL | Age: 33
End: 2023-03-23
Attending: INTERNAL MEDICINE
Payer: COMMERCIAL

## 2023-03-23 DIAGNOSIS — R00.2 HEART PALPITATIONS: ICD-10-CM

## 2023-03-23 LAB
ALBUMIN SERPL BCP-MCNC: 4.1 G/DL (ref 3.5–5.2)
ALP SERPL-CCNC: 53 U/L (ref 55–135)
ALT SERPL W/O P-5'-P-CCNC: 9 U/L (ref 10–44)
ANION GAP SERPL CALC-SCNC: 7 MMOL/L (ref 8–16)
AST SERPL-CCNC: 19 U/L (ref 10–40)
BASOPHILS # BLD AUTO: 0.02 K/UL (ref 0–0.2)
BASOPHILS NFR BLD: 0.4 % (ref 0–1.9)
BILIRUB SERPL-MCNC: 0.8 MG/DL (ref 0.1–1)
BUN SERPL-MCNC: 18 MG/DL (ref 6–20)
CALCIUM SERPL-MCNC: 9.5 MG/DL (ref 8.7–10.5)
CHLORIDE SERPL-SCNC: 108 MMOL/L (ref 95–110)
CO2 SERPL-SCNC: 22 MMOL/L (ref 23–29)
CREAT SERPL-MCNC: 1.1 MG/DL (ref 0.5–1.4)
DIFFERENTIAL METHOD: ABNORMAL
EOSINOPHIL # BLD AUTO: 0.1 K/UL (ref 0–0.5)
EOSINOPHIL NFR BLD: 2.2 % (ref 0–8)
ERYTHROCYTE [DISTWIDTH] IN BLOOD BY AUTOMATED COUNT: 12.8 % (ref 11.5–14.5)
EST. GFR  (NO RACE VARIABLE): >60 ML/MIN/1.73 M^2
GLUCOSE SERPL-MCNC: 89 MG/DL (ref 70–110)
HCT VFR BLD AUTO: 37.9 % (ref 37–48.5)
HGB BLD-MCNC: 12.8 G/DL (ref 12–16)
IMM GRANULOCYTES # BLD AUTO: 0 K/UL (ref 0–0.04)
IMM GRANULOCYTES NFR BLD AUTO: 0 % (ref 0–0.5)
LYMPHOCYTES # BLD AUTO: 1.8 K/UL (ref 1–4.8)
LYMPHOCYTES NFR BLD: 35.8 % (ref 18–48)
MAGNESIUM SERPL-MCNC: 1.9 MG/DL (ref 1.6–2.6)
MCH RBC QN AUTO: 31.3 PG (ref 27–31)
MCHC RBC AUTO-ENTMCNC: 33.8 G/DL (ref 32–36)
MCV RBC AUTO: 93 FL (ref 82–98)
MONOCYTES # BLD AUTO: 0.4 K/UL (ref 0.3–1)
MONOCYTES NFR BLD: 7.8 % (ref 4–15)
NEUTROPHILS # BLD AUTO: 2.6 K/UL (ref 1.8–7.7)
NEUTROPHILS NFR BLD: 53.8 % (ref 38–73)
NRBC BLD-RTO: 0 /100 WBC
PHOSPHATE SERPL-MCNC: 3.7 MG/DL (ref 2.7–4.5)
PLATELET # BLD AUTO: 166 K/UL (ref 150–450)
PMV BLD AUTO: 10.8 FL (ref 9.2–12.9)
POTASSIUM SERPL-SCNC: 4 MMOL/L (ref 3.5–5.1)
PROT SERPL-MCNC: 7.3 G/DL (ref 6–8.4)
RBC # BLD AUTO: 4.09 M/UL (ref 4–5.4)
SODIUM SERPL-SCNC: 137 MMOL/L (ref 136–145)
WBC # BLD AUTO: 4.89 K/UL (ref 3.9–12.7)

## 2023-03-23 PROCEDURE — 85025 COMPLETE CBC W/AUTO DIFF WBC: CPT | Performed by: INTERNAL MEDICINE

## 2023-03-23 PROCEDURE — 36415 COLL VENOUS BLD VENIPUNCTURE: CPT | Performed by: INTERNAL MEDICINE

## 2023-03-23 PROCEDURE — 83735 ASSAY OF MAGNESIUM: CPT | Performed by: INTERNAL MEDICINE

## 2023-03-23 PROCEDURE — 84100 ASSAY OF PHOSPHORUS: CPT | Performed by: INTERNAL MEDICINE

## 2023-03-23 PROCEDURE — 80053 COMPREHEN METABOLIC PANEL: CPT | Performed by: INTERNAL MEDICINE

## 2023-04-24 ENCOUNTER — HOSPITAL ENCOUNTER (OUTPATIENT)
Dept: SLEEP MEDICINE | Facility: HOSPITAL | Age: 33
Discharge: HOME | End: 2023-04-24
Attending: INTERNAL MEDICINE
Payer: COMMERCIAL

## 2023-04-24 DIAGNOSIS — E66.09 CLASS 1 OBESITY DUE TO EXCESS CALORIES WITH SERIOUS COMORBIDITY AND BODY MASS INDEX (BMI) OF 32.0 TO 32.9 IN ADULT: ICD-10-CM

## 2023-04-24 DIAGNOSIS — R00.2 HEART PALPITATIONS: ICD-10-CM

## 2023-04-24 PROCEDURE — 95806 SLEEP STUDY UNATT&RESP EFFT: CPT

## 2023-05-03 ENCOUNTER — PATIENT MESSAGE (OUTPATIENT)
Dept: INTERNAL MEDICINE | Facility: CLINIC | Age: 33
End: 2023-05-03
Payer: COMMERCIAL

## 2023-05-03 DIAGNOSIS — G47.33 OSA (OBSTRUCTIVE SLEEP APNEA): Primary | ICD-10-CM

## 2023-06-06 ENCOUNTER — OFFICE VISIT (OUTPATIENT)
Dept: INTERNAL MEDICINE | Facility: CLINIC | Age: 33
End: 2023-06-06
Payer: COMMERCIAL

## 2023-06-06 VITALS
HEIGHT: 61 IN | HEART RATE: 74 BPM | BODY MASS INDEX: 32.63 KG/M2 | OXYGEN SATURATION: 99 % | RESPIRATION RATE: 16 BRPM | DIASTOLIC BLOOD PRESSURE: 70 MMHG | SYSTOLIC BLOOD PRESSURE: 114 MMHG | WEIGHT: 172.81 LBS

## 2023-06-06 DIAGNOSIS — G47.33 OSA ON CPAP: Primary | ICD-10-CM

## 2023-06-06 DIAGNOSIS — M79.602 LEFT ARM PAIN: ICD-10-CM

## 2023-06-06 PROCEDURE — 3074F PR MOST RECENT SYSTOLIC BLOOD PRESSURE < 130 MM HG: ICD-10-PCS | Mod: CPTII,S$GLB,, | Performed by: INTERNAL MEDICINE

## 2023-06-06 PROCEDURE — 1159F MED LIST DOCD IN RCRD: CPT | Mod: CPTII,S$GLB,, | Performed by: INTERNAL MEDICINE

## 2023-06-06 PROCEDURE — 1160F RVW MEDS BY RX/DR IN RCRD: CPT | Mod: CPTII,S$GLB,, | Performed by: INTERNAL MEDICINE

## 2023-06-06 PROCEDURE — 99999 PR PBB SHADOW E&M-EST. PATIENT-LVL III: ICD-10-PCS | Mod: PBBFAC,,, | Performed by: INTERNAL MEDICINE

## 2023-06-06 PROCEDURE — 3074F SYST BP LT 130 MM HG: CPT | Mod: CPTII,S$GLB,, | Performed by: INTERNAL MEDICINE

## 2023-06-06 PROCEDURE — 99213 OFFICE O/P EST LOW 20 MIN: CPT | Mod: S$GLB,,, | Performed by: INTERNAL MEDICINE

## 2023-06-06 PROCEDURE — 3008F PR BODY MASS INDEX (BMI) DOCUMENTED: ICD-10-PCS | Mod: CPTII,S$GLB,, | Performed by: INTERNAL MEDICINE

## 2023-06-06 PROCEDURE — 99999 PR PBB SHADOW E&M-EST. PATIENT-LVL III: CPT | Mod: PBBFAC,,, | Performed by: INTERNAL MEDICINE

## 2023-06-06 PROCEDURE — 99213 PR OFFICE/OUTPT VISIT, EST, LEVL III, 20-29 MIN: ICD-10-PCS | Mod: S$GLB,,, | Performed by: INTERNAL MEDICINE

## 2023-06-06 PROCEDURE — 3078F PR MOST RECENT DIASTOLIC BLOOD PRESSURE < 80 MM HG: ICD-10-PCS | Mod: CPTII,S$GLB,, | Performed by: INTERNAL MEDICINE

## 2023-06-06 PROCEDURE — 1159F PR MEDICATION LIST DOCUMENTED IN MEDICAL RECORD: ICD-10-PCS | Mod: CPTII,S$GLB,, | Performed by: INTERNAL MEDICINE

## 2023-06-06 PROCEDURE — 1160F PR REVIEW ALL MEDS BY PRESCRIBER/CLIN PHARMACIST DOCUMENTED: ICD-10-PCS | Mod: CPTII,S$GLB,, | Performed by: INTERNAL MEDICINE

## 2023-06-06 PROCEDURE — 3078F DIAST BP <80 MM HG: CPT | Mod: CPTII,S$GLB,, | Performed by: INTERNAL MEDICINE

## 2023-06-06 PROCEDURE — 3008F BODY MASS INDEX DOCD: CPT | Mod: CPTII,S$GLB,, | Performed by: INTERNAL MEDICINE

## 2023-06-06 NOTE — PROGRESS NOTES
Subjective:       Patient ID: Keisha Mena is a 32 y.o. female.    Chief Complaint: Follow-up (CPAP. Sleeping better. Wakes up once with the arm pain. )      HPI:    Patient is known to me and presents for follow up FELICIA. Sleep study completed 5/2023 showing mild sleep apnea and Autopap 4-20 cm recommended. She was initially having palpitations during the night waking her from sleep concerning for apena as etiology in setting of obesity and snoring. Today she reports improvement in her symptoms. Sleeping better. Palpitations are improved. She is wearing every night and wearing throughout the night. She has much more energy during the day. Occasional issues with fitting her mask but overall doing great.     With laying flat she is noting more issues with pain in her left arm. Can occur b/l but worse on the left. Has had int he past. Did PT. Gets shooting pins and needles sensation from neck/shoulder into the upper arm. Seems to be positional.       Past Medical History:   Diagnosis Date    Anxiety     Female bladder prolapse     Hypertension        Family History   Problem Relation Age of Onset    Hypertension Mother     Alcohol abuse Father         history of s/p liver transplant    Breast cancer Neg Hx     Colon cancer Neg Hx     Ovarian cancer Neg Hx        Social History     Socioeconomic History    Marital status:    Tobacco Use    Smoking status: Never    Smokeless tobacco: Never   Substance and Sexual Activity    Alcohol use: No    Drug use: No    Sexual activity: Yes     Partners: Male     Birth control/protection: None     Comment:        Review of Systems   Constitutional:  Negative for activity change, fatigue, fever and unexpected weight change.   HENT:  Negative for congestion, ear pain, hearing loss, rhinorrhea and sore throat.    Eyes:  Negative for redness and visual disturbance.   Respiratory:  Negative for cough, shortness of breath and wheezing.    Cardiovascular:  Negative for chest  pain, palpitations and leg swelling.   Gastrointestinal:  Negative for abdominal pain, constipation, diarrhea, nausea and vomiting.   Genitourinary:  Negative for dysuria, frequency and urgency.   Musculoskeletal:  Negative for back pain, joint swelling and neck pain.   Skin:  Negative for color change, rash and wound.   Neurological:  Positive for numbness (pins and needles left arm). Negative for dizziness, tremors, weakness, light-headedness and headaches.       Objective:      Physical Exam  Vitals reviewed.   Constitutional:       General: She is not in acute distress.     Appearance: She is well-developed.   HENT:      Head: Normocephalic and atraumatic.      Right Ear: External ear normal.      Left Ear: External ear normal.      Nose: Nose normal.   Eyes:      General:         Right eye: No discharge.         Left eye: No discharge.      Extraocular Movements: Extraocular movements intact.      Conjunctiva/sclera: Conjunctivae normal.   Neck:      Thyroid: No thyromegaly.   Cardiovascular:      Rate and Rhythm: Normal rate and regular rhythm.   Pulmonary:      Effort: Pulmonary effort is normal. No respiratory distress.   Skin:     General: Skin is warm and dry.   Neurological:      Mental Status: She is alert and oriented to person, place, and time.      Cranial Nerves: No cranial nerve deficit.   Psychiatric:         Behavior: Behavior normal.         Thought Content: Thought content normal.       Assessment:       1. FELICIA on CPAP    2. Left arm pain        Plan:       1. FELICIA on CPAP  Chronic controlled  Weraing CPAP nightly and all night  Sx are improved including less fatigue, anxiety, snoring and palpitations  Cont healthy diet, weight loss    2. Left arm pain  Chronic, not improving  Has done PT with some relief  Can trial massage therapy and chiropractic care  Likely pinched nerve from C-spine vs from muscle tension in upper back  Discussed gabapentin but she is young and with FELICIA sedating meds not the  best and she agrees, prefers to avoid long term meds if possible  Seems to mostly be positional with laying flat with CPAP

## 2023-08-11 ENCOUNTER — OFFICE VISIT (OUTPATIENT)
Dept: INTERNAL MEDICINE | Facility: CLINIC | Age: 33
End: 2023-08-11
Payer: COMMERCIAL

## 2023-08-11 ENCOUNTER — TELEPHONE (OUTPATIENT)
Dept: INTERNAL MEDICINE | Facility: CLINIC | Age: 33
End: 2023-08-11
Payer: COMMERCIAL

## 2023-08-11 VITALS
HEIGHT: 61 IN | HEART RATE: 81 BPM | RESPIRATION RATE: 16 BRPM | OXYGEN SATURATION: 100 % | DIASTOLIC BLOOD PRESSURE: 78 MMHG | BODY MASS INDEX: 33.63 KG/M2 | SYSTOLIC BLOOD PRESSURE: 122 MMHG | WEIGHT: 178.13 LBS

## 2023-08-11 DIAGNOSIS — K62.89 RECTAL PAIN: Primary | ICD-10-CM

## 2023-08-11 PROCEDURE — 3074F SYST BP LT 130 MM HG: CPT | Mod: CPTII,S$GLB,, | Performed by: INTERNAL MEDICINE

## 2023-08-11 PROCEDURE — 3008F BODY MASS INDEX DOCD: CPT | Mod: CPTII,S$GLB,, | Performed by: INTERNAL MEDICINE

## 2023-08-11 PROCEDURE — 99999 PR PBB SHADOW E&M-EST. PATIENT-LVL IV: ICD-10-PCS | Mod: PBBFAC,,, | Performed by: INTERNAL MEDICINE

## 2023-08-11 PROCEDURE — 1159F MED LIST DOCD IN RCRD: CPT | Mod: CPTII,S$GLB,, | Performed by: INTERNAL MEDICINE

## 2023-08-11 PROCEDURE — 99213 OFFICE O/P EST LOW 20 MIN: CPT | Mod: S$GLB,,, | Performed by: INTERNAL MEDICINE

## 2023-08-11 PROCEDURE — 99999 PR PBB SHADOW E&M-EST. PATIENT-LVL IV: CPT | Mod: PBBFAC,,, | Performed by: INTERNAL MEDICINE

## 2023-08-11 PROCEDURE — 1159F PR MEDICATION LIST DOCUMENTED IN MEDICAL RECORD: ICD-10-PCS | Mod: CPTII,S$GLB,, | Performed by: INTERNAL MEDICINE

## 2023-08-11 PROCEDURE — 1160F PR REVIEW ALL MEDS BY PRESCRIBER/CLIN PHARMACIST DOCUMENTED: ICD-10-PCS | Mod: CPTII,S$GLB,, | Performed by: INTERNAL MEDICINE

## 2023-08-11 PROCEDURE — 3008F PR BODY MASS INDEX (BMI) DOCUMENTED: ICD-10-PCS | Mod: CPTII,S$GLB,, | Performed by: INTERNAL MEDICINE

## 2023-08-11 PROCEDURE — 1160F RVW MEDS BY RX/DR IN RCRD: CPT | Mod: CPTII,S$GLB,, | Performed by: INTERNAL MEDICINE

## 2023-08-11 PROCEDURE — 3078F PR MOST RECENT DIASTOLIC BLOOD PRESSURE < 80 MM HG: ICD-10-PCS | Mod: CPTII,S$GLB,, | Performed by: INTERNAL MEDICINE

## 2023-08-11 PROCEDURE — 3078F DIAST BP <80 MM HG: CPT | Mod: CPTII,S$GLB,, | Performed by: INTERNAL MEDICINE

## 2023-08-11 PROCEDURE — 99213 PR OFFICE/OUTPT VISIT, EST, LEVL III, 20-29 MIN: ICD-10-PCS | Mod: S$GLB,,, | Performed by: INTERNAL MEDICINE

## 2023-08-11 PROCEDURE — 3074F PR MOST RECENT SYSTOLIC BLOOD PRESSURE < 130 MM HG: ICD-10-PCS | Mod: CPTII,S$GLB,, | Performed by: INTERNAL MEDICINE

## 2023-08-11 RX ORDER — HYDROCORTISONE ACETATE PRAMOXINE HCL 1; 1 G/100G; G/100G
CREAM TOPICAL 3 TIMES DAILY
Qty: 30 G | Refills: 1 | Status: SHIPPED | OUTPATIENT
Start: 2023-08-11

## 2023-08-11 RX ORDER — CLINDAMYCIN HYDROCHLORIDE 300 MG/1
300 CAPSULE ORAL EVERY 8 HOURS
Qty: 21 CAPSULE | Refills: 0 | Status: SHIPPED | OUTPATIENT
Start: 2023-08-11 | End: 2023-08-18

## 2023-08-11 NOTE — TELEPHONE ENCOUNTER
I called patient because she made a my chart appointment with Eloise for 1:20 PM today, but her PCP Dr. Fung had a opening so I moved patient to Dr. Fung's schedule for 1:00 PM today. Patient voiced understanding.

## 2023-08-11 NOTE — PROGRESS NOTES
"Subjective:       Patient ID: Keisha Mena is a 32 y.o. female.    Chief Complaint: Rectal Pain (X4 days)      HPI:  Patient is known to me and presents with rectal pain. Sx started 4-5 days ago. Pain on left side. No fevers. Feels "firm" on left side buttocks. Taking tyelnol for pain control. 2 days before pain started saw some blood with wiping but since then no blood mixed in stool; on menstrual cycle. Pain is constant, worse with bowel movement and taking stool softeners and miralax.         Past Medical History:   Diagnosis Date    Anxiety     Female bladder prolapse     Hypertension        Family History   Problem Relation Age of Onset    Hypertension Mother     Alcohol abuse Father         history of s/p liver transplant    Breast cancer Neg Hx     Colon cancer Neg Hx     Ovarian cancer Neg Hx        Social History     Socioeconomic History    Marital status:    Tobacco Use    Smoking status: Never    Smokeless tobacco: Never   Substance and Sexual Activity    Alcohol use: No    Drug use: No    Sexual activity: Yes     Partners: Male     Birth control/protection: None     Comment:      Social Determinants of Health     Physical Activity: Insufficiently Active (5/20/2020)    Exercise Vital Sign     Days of Exercise per Week: 1 day     Minutes of Exercise per Session: 10 min   Stress: Stress Concern Present (5/20/2020)    [employer] - Occupational Stress Questionnaire     Feeling of Stress : To some extent       Review of Systems   Constitutional:  Negative for activity change, fatigue, fever and unexpected weight change.   HENT:  Negative for congestion, ear pain, hearing loss, rhinorrhea and sore throat.    Eyes:  Negative for redness and visual disturbance.   Respiratory:  Negative for cough, shortness of breath and wheezing.    Cardiovascular:  Negative for chest pain, palpitations and leg swelling.   Gastrointestinal:  Positive for rectal pain. Negative for " abdominal pain, constipation, diarrhea, nausea and vomiting.   Genitourinary:  Negative for dysuria, frequency and urgency.   Musculoskeletal:  Negative for back pain, joint swelling and neck pain.   Skin:  Negative for color change, rash and wound.   Neurological:  Negative for dizziness, tremors, weakness, light-headedness and headaches.         Objective:      Physical Exam  Vitals reviewed. Exam conducted with a chaperone present.   Constitutional:       General: She is not in acute distress.     Appearance: She is well-developed.   HENT:      Head: Normocephalic and atraumatic.      Right Ear: External ear normal.      Left Ear: External ear normal.      Nose: Nose normal.   Eyes:      General:         Right eye: No discharge.         Left eye: No discharge.      Conjunctiva/sclera: Conjunctivae normal.   Neck:      Thyroid: No thyromegaly.   Cardiovascular:      Rate and Rhythm: Normal rate and regular rhythm.   Pulmonary:      Effort: Pulmonary effort is normal. No respiratory distress.   Genitourinary:     Rectum: Tenderness and internal hemorrhoid (? hemorrhoid vs abscess right side; pain with palpation and no blood) present. No anal fissure or external hemorrhoid. Normal anal tone.   Skin:     General: Skin is warm and dry.   Neurological:      Mental Status: She is alert and oriented to person, place, and time. Mental status is at baseline.   Psychiatric:         Behavior: Behavior normal.         Thought Content: Thought content normal.         Assessment:       1. Rectal pain        Plan:       1. Rectal pain  She has tenderness and induration on the right side on internal exam  No gross blood on exam  Likely an internal hemorrhoid but based on exam I have some concern for developing underlying infection  There is no fissure or fistula on exam  Will start PO clinadmycin  Sitz bath, proctocream for pain control  Cont colace, miralax and keep area clean with baby wipes  ER precautions for this weekend  discussed as if this is abscess and gets worse despite antibx will need I&D. The location is not in an area I could I&D in clinic regardless  -     clindamycin (CLEOCIN) 300 MG capsule; Take 1 capsule (300 mg total) by mouth every 8 (eight) hours. for 7 days  Dispense: 21 capsule; Refill: 0  -     pramoxine-hydrocortisone (PROCTOCREAM-HC) 1-1 % rectal cream; Place rectally 3 (three) times daily.  Dispense: 30 g; Refill: 1

## 2023-08-15 ENCOUNTER — PATIENT OUTREACH (OUTPATIENT)
Dept: ADMINISTRATIVE | Facility: HOSPITAL | Age: 33
End: 2023-08-15
Payer: COMMERCIAL

## 2023-08-27 ENCOUNTER — HOSPITAL ENCOUNTER (EMERGENCY)
Facility: HOSPITAL | Age: 33
Discharge: HOME | End: 2023-08-27
Attending: SURGERY
Payer: COMMERCIAL

## 2023-08-27 VITALS
DIASTOLIC BLOOD PRESSURE: 79 MMHG | SYSTOLIC BLOOD PRESSURE: 143 MMHG | WEIGHT: 176.69 LBS | TEMPERATURE: 99 F | HEART RATE: 88 BPM | RESPIRATION RATE: 20 BRPM | BODY MASS INDEX: 33.39 KG/M2 | OXYGEN SATURATION: 100 %

## 2023-08-27 DIAGNOSIS — R07.89 CHEST TIGHTNESS: ICD-10-CM

## 2023-08-27 DIAGNOSIS — K62.89 PROCTITIS: Primary | ICD-10-CM

## 2023-08-27 LAB
ALBUMIN SERPL BCP-MCNC: 4.4 G/DL (ref 3.5–5.2)
ALP SERPL-CCNC: 56 U/L (ref 55–135)
ALT SERPL W/O P-5'-P-CCNC: 15 U/L (ref 10–44)
ANION GAP SERPL CALC-SCNC: 13 MMOL/L (ref 8–16)
AST SERPL-CCNC: 22 U/L (ref 10–40)
B-HCG UR QL: NEGATIVE
BASOPHILS # BLD AUTO: 0.03 K/UL (ref 0–0.2)
BASOPHILS NFR BLD: 0.2 % (ref 0–1.9)
BILIRUB SERPL-MCNC: 0.5 MG/DL (ref 0.1–1)
BILIRUB UR QL STRIP: NEGATIVE
BUN SERPL-MCNC: 14 MG/DL (ref 6–20)
CALCIUM SERPL-MCNC: 9.6 MG/DL (ref 8.7–10.5)
CHLORIDE SERPL-SCNC: 107 MMOL/L (ref 95–110)
CLARITY UR: CLEAR
CO2 SERPL-SCNC: 19 MMOL/L (ref 23–29)
COLOR UR: YELLOW
CREAT SERPL-MCNC: 0.9 MG/DL (ref 0.5–1.4)
DIFFERENTIAL METHOD: ABNORMAL
EOSINOPHIL # BLD AUTO: 0.1 K/UL (ref 0–0.5)
EOSINOPHIL NFR BLD: 0.8 % (ref 0–8)
ERYTHROCYTE [DISTWIDTH] IN BLOOD BY AUTOMATED COUNT: 12.8 % (ref 11.5–14.5)
EST. GFR  (NO RACE VARIABLE): >60 ML/MIN/1.73 M^2
GLUCOSE SERPL-MCNC: 84 MG/DL (ref 70–110)
GLUCOSE UR QL STRIP: NEGATIVE
HCT VFR BLD AUTO: 39.3 % (ref 37–48.5)
HGB BLD-MCNC: 13.7 G/DL (ref 12–16)
HGB UR QL STRIP: ABNORMAL
IMM GRANULOCYTES # BLD AUTO: 0.05 K/UL (ref 0–0.04)
IMM GRANULOCYTES NFR BLD AUTO: 0.4 % (ref 0–0.5)
INFLUENZA A, MOLECULAR: NEGATIVE
INFLUENZA B, MOLECULAR: NEGATIVE
KETONES UR QL STRIP: NEGATIVE
LACTATE SERPL-SCNC: 0.9 MMOL/L (ref 0.5–2.2)
LEUKOCYTE ESTERASE UR QL STRIP: NEGATIVE
LYMPHOCYTES # BLD AUTO: 1.1 K/UL (ref 1–4.8)
LYMPHOCYTES NFR BLD: 7.9 % (ref 18–48)
MCH RBC QN AUTO: 31.7 PG (ref 27–31)
MCHC RBC AUTO-ENTMCNC: 34.9 G/DL (ref 32–36)
MCV RBC AUTO: 91 FL (ref 82–98)
MONOCYTES # BLD AUTO: 0.9 K/UL (ref 0.3–1)
MONOCYTES NFR BLD: 6.2 % (ref 4–15)
NEUTROPHILS # BLD AUTO: 12 K/UL (ref 1.8–7.7)
NEUTROPHILS NFR BLD: 84.5 % (ref 38–73)
NITRITE UR QL STRIP: NEGATIVE
NRBC BLD-RTO: 0 /100 WBC
PH UR STRIP: 6 [PH] (ref 5–8)
PLATELET # BLD AUTO: 191 K/UL (ref 150–450)
PMV BLD AUTO: 10.6 FL (ref 9.2–12.9)
POTASSIUM SERPL-SCNC: 4 MMOL/L (ref 3.5–5.1)
PROT SERPL-MCNC: 7.8 G/DL (ref 6–8.4)
PROT UR QL STRIP: NEGATIVE
RBC # BLD AUTO: 4.32 M/UL (ref 4–5.4)
SARS-COV-2 RDRP RESP QL NAA+PROBE: NEGATIVE
SODIUM SERPL-SCNC: 139 MMOL/L (ref 136–145)
SP GR UR STRIP: 1.01 (ref 1–1.03)
SPECIMEN SOURCE: NORMAL
URN SPEC COLLECT METH UR: ABNORMAL
UROBILINOGEN UR STRIP-ACNC: NEGATIVE EU/DL
WBC # BLD AUTO: 14.18 K/UL (ref 3.9–12.7)

## 2023-08-27 PROCEDURE — 99285 EMERGENCY DEPT VISIT HI MDM: CPT | Mod: 25

## 2023-08-27 PROCEDURE — 87040 BLOOD CULTURE FOR BACTERIA: CPT | Performed by: NURSE PRACTITIONER

## 2023-08-27 PROCEDURE — 85025 COMPLETE CBC W/AUTO DIFF WBC: CPT | Performed by: NURSE PRACTITIONER

## 2023-08-27 PROCEDURE — 83605 ASSAY OF LACTIC ACID: CPT | Performed by: NURSE PRACTITIONER

## 2023-08-27 PROCEDURE — 93005 ELECTROCARDIOGRAM TRACING: CPT

## 2023-08-27 PROCEDURE — 36415 COLL VENOUS BLD VENIPUNCTURE: CPT | Performed by: NURSE PRACTITIONER

## 2023-08-27 PROCEDURE — 81003 URINALYSIS AUTO W/O SCOPE: CPT | Performed by: NURSE PRACTITIONER

## 2023-08-27 PROCEDURE — 80053 COMPREHEN METABOLIC PANEL: CPT | Performed by: NURSE PRACTITIONER

## 2023-08-27 PROCEDURE — U0002 COVID-19 LAB TEST NON-CDC: HCPCS | Performed by: NURSE PRACTITIONER

## 2023-08-27 PROCEDURE — 87502 INFLUENZA DNA AMP PROBE: CPT | Performed by: NURSE PRACTITIONER

## 2023-08-27 PROCEDURE — 81025 URINE PREGNANCY TEST: CPT | Performed by: NURSE PRACTITIONER

## 2023-08-27 PROCEDURE — 25000003 PHARM REV CODE 250: Performed by: NURSE PRACTITIONER

## 2023-08-27 PROCEDURE — 93010 ELECTROCARDIOGRAM REPORT: CPT | Mod: ,,, | Performed by: INTERNAL MEDICINE

## 2023-08-27 PROCEDURE — 93010 EKG 12-LEAD: ICD-10-PCS | Mod: ,,, | Performed by: INTERNAL MEDICINE

## 2023-08-27 PROCEDURE — 96360 HYDRATION IV INFUSION INIT: CPT

## 2023-08-27 PROCEDURE — 25500020 PHARM REV CODE 255: Performed by: SURGERY

## 2023-08-27 PROCEDURE — 99900035 HC TECH TIME PER 15 MIN (STAT)

## 2023-08-27 RX ORDER — METRONIDAZOLE 500 MG/1
500 TABLET ORAL 3 TIMES DAILY
Qty: 21 TABLET | Refills: 0 | Status: SHIPPED | OUTPATIENT
Start: 2023-08-27 | End: 2023-09-03

## 2023-08-27 RX ORDER — CIPROFLOXACIN 500 MG/1
500 TABLET ORAL
Status: COMPLETED | OUTPATIENT
Start: 2023-08-27 | End: 2023-08-27

## 2023-08-27 RX ORDER — SODIUM CHLORIDE 9 MG/ML
1000 INJECTION, SOLUTION INTRAVENOUS
Status: COMPLETED | OUTPATIENT
Start: 2023-08-27 | End: 2023-08-27

## 2023-08-27 RX ORDER — CIPROFLOXACIN 500 MG/1
500 TABLET ORAL EVERY 12 HOURS
Qty: 14 TABLET | Refills: 0 | Status: SHIPPED | OUTPATIENT
Start: 2023-08-27 | End: 2023-09-03

## 2023-08-27 RX ORDER — METRONIDAZOLE 500 MG/1
500 TABLET ORAL
Status: COMPLETED | OUTPATIENT
Start: 2023-08-27 | End: 2023-08-27

## 2023-08-27 RX ADMIN — SODIUM CHLORIDE 1000 ML: 9 INJECTION, SOLUTION INTRAVENOUS at 03:08

## 2023-08-27 RX ADMIN — METRONIDAZOLE 500 MG: 500 TABLET ORAL at 05:08

## 2023-08-27 RX ADMIN — CIPROFLOXACIN 500 MG: 500 TABLET, FILM COATED ORAL at 05:08

## 2023-08-27 RX ADMIN — IOHEXOL 75 ML: 350 INJECTION, SOLUTION INTRAVENOUS at 04:08

## 2023-08-27 NOTE — ED PROVIDER NOTES
Encounter Date: 8/27/2023       History     Chief Complaint   Patient presents with    Fever     Patient to ER reports she is on clindamycin for a internal abscess she was instructed if she starts running a fever to be seen in the ER, patient reports fever, chills which started today        Keisha Mena is a 32 y.o. female with PMH of HTN and anxiety who presents to the ED for evaluation of fever and rectal pain.  Acute onset of fever with associated chills and body aches today. She reports one episode of loose stool PTA with bloody mucous and rectal pain. She was evaluated by her PCP on 08/11/23 for rectal pain and was placed on clindamycin and proctofoam for possible developing rectal abscess vs internal hemorrhoid. She reports that she was told that if she develops fever or increased pain to present to the ED. This afternoon, she started feeling feverish with chills and body aches and had severe pain with BM with bloody tissue prompting ED visit. No abdominal pain. Rectal pain only with BMs. No anal sex or penetration.     The history is provided by the patient.     Review of patient's allergies indicates:  No Known Allergies  Past Medical History:   Diagnosis Date    Anxiety     Female bladder prolapse     Hypertension      Past Surgical History:   Procedure Laterality Date    DIAGNOSTIC LAPAROSCOPY N/A 2/7/2021    Procedure: LAPAROSCOPY, DIAGNOSTIC;  Surgeon: Tea Schneider MD;  Location: [facility] OR;  Service: OB/GYN;  Laterality: N/A;    LAPAROSCOPIC SALPINGECTOMY Left 2/7/2021    Procedure: SALPINGECTOMY, LAPAROSCOPIC;  Surgeon: Tea Schneider MD;  Location: [facility] OR;  Service: OB/GYN;  Laterality: Left;     Family History   Problem Relation Age of Onset    Hypertension Mother     Alcohol abuse Father         history of s/p liver transplant    Breast cancer Neg Hx     Colon cancer Neg Hx     Ovarian cancer Neg Hx      Social History     Tobacco Use    Smoking status: Never    Smokeless tobacco:  Never   Substance Use Topics    Alcohol use: No    Drug use: No     Review of Systems   Constitutional:  Positive for chills and fever.   HENT:  Negative for sore throat.    Respiratory:  Negative for chest tightness and shortness of breath.    Cardiovascular:  Negative for chest pain.   Gastrointestinal:  Positive for rectal pain. Negative for abdominal pain and nausea.   Genitourinary:  Negative for dysuria, frequency and urgency.   Musculoskeletal:  Positive for myalgias. Negative for back pain.   Skin:  Negative for rash.   Neurological:  Negative for dizziness, weakness, light-headedness and numbness.   Hematological:  Does not bruise/bleed easily.       Physical Exam     Initial Vitals [08/27/23 1457]   BP Pulse Resp Temp SpO2   (!) 154/96 (!) 114 (!) 22 99.4 °F (37.4 °C) 99 %      MAP       --         Physical Exam    Nursing note and vitals reviewed.  Constitutional: She appears well-developed and well-nourished.   HENT:   Head: Normocephalic and atraumatic.   Right Ear: Tympanic membrane, external ear and ear canal normal. Tympanic membrane is not erythematous. No middle ear effusion.   Left Ear: Tympanic membrane, external ear and ear canal normal. Tympanic membrane is not erythematous.  No middle ear effusion.   Nose: Nose normal.   Mouth/Throat: Uvula is midline, oropharynx is clear and moist and mucous membranes are normal. Mucous membranes are not pale and not dry.   Eyes: Conjunctivae and EOM are normal. Pupils are equal, round, and reactive to light.   Neck: Neck supple.   Normal range of motion.  Cardiovascular:  Normal rate, regular rhythm, normal heart sounds and intact distal pulses.           Pulmonary/Chest: Effort normal and breath sounds normal. She has no decreased breath sounds. She has no wheezes. She has no rhonchi. She has no rales.   Abdominal: Abdomen is soft. Bowel sounds are normal. There is no abdominal tenderness.   Genitourinary: Rectum:      Tenderness present.      No rectal  mass, anal fissure, external hemorrhoid or internal hemorrhoid.     Musculoskeletal:         General: Normal range of motion.      Cervical back: Normal range of motion and neck supple.     Neurological: She is alert and oriented to person, place, and time. She has normal strength. She displays normal reflexes. No cranial nerve deficit or sensory deficit.   Skin: Skin is warm and dry. Capillary refill takes less than 2 seconds. No rash noted.   Psychiatric: She has a normal mood and affect. Her behavior is normal. Judgment and thought content normal.         ED Course   Procedures  Labs Reviewed   CBC W/ AUTO DIFFERENTIAL - Abnormal; Notable for the following components:       Result Value    WBC 14.18 (*)     MCH 31.7 (*)     Gran # (ANC) 12.0 (*)     Immature Grans (Abs) 0.05 (*)     Gran % 84.5 (*)     Lymph % 7.9 (*)     All other components within normal limits   COMPREHENSIVE METABOLIC PANEL - Abnormal; Notable for the following components:    CO2 19 (*)     All other components within normal limits   URINALYSIS, REFLEX TO URINE CULTURE - Abnormal; Notable for the following components:    Occult Blood UA Trace (*)     All other components within normal limits    Narrative:     Specimen Source->Urine   INFLUENZA A & B BY MOLECULAR   CULTURE, BLOOD   CULTURE, BLOOD   SARS-COV-2 RNA AMPLIFICATION, QUAL   PREGNANCY TEST, URINE RAPID    Narrative:     Specimen Source->Urine   LACTIC ACID, PLASMA          Imaging Results              CT Abdomen Pelvis With Contrast (Final result)  Result time 08/27/23 16:45:04      Final result by Carlos Shannon MD (08/27/23 16:45:04)                   Impression:      1. Possible constipation.  2. Small fat containing umbilical hernia.  3. Incomplete distension of the rectum with possible minimal wall thickening.  There is a history of rectal pain.  Mild proctitis is not excluded.  Recommend clinical correlation.      Electronically signed by: Carlos  Galen  Date:    08/27/2023  Time:    16:45               Narrative:    EXAMINATION:  CT ABDOMEN PELVIS WITH CONTRAST    CLINICAL HISTORY:  Abdominal abscess/infection suspected;    TECHNIQUE:  Low dose axial images, sagittal and coronal reformations were obtained from the lung bases to the pubic symphysis following the IV administration of 75 mL of Omnipaque 350 .  Oral contrast was not administered.    COMPARISON:  None.    FINDINGS:  Abdomen:    - Lower thorax:    - Lung bases: No infiltrates and no nodules.    - Liver: No focal mass.    - Gallbladder: No calcified gallstones.    - Bile Ducts: No evidence of intra or extra hepatic biliary ductal dilation.    - Spleen: Negative.    - Kidneys: No mass or hydronephrosis.    - Adrenals: Unremarkable.    - Pancreas: No mass or peripancreatic fat stranding.    - Retroperitoneum:  No significant adenopathy.    - Vascular: No abdominal aortic aneurysm.    - Abdominal wall:  Small fat containing umbilical hernia.    Pelvis:    Urinary bladder is within normal limits.    The uterus is mildly retroverted with minimal fluid in the endometrial cavity.  This may be physiologic.    No free fluid in the pelvis.  Few follicles in the ovaries.    The appendix is within normal limits.    There is moderate retained feces throughout the colon.    No abscess is detected.  No free air is detected.    Bowel/Mesentery:    No evidence of bowel obstruction.  There is incomplete distension of the rectum.  Minimal wall thickening is not excluded.  There is a history of rectal pain.  Mild proctitis is not excluded.    Bones:  No acute osseous abnormality and no suspicious lytic or blastic lesion.                                       Medications   0.9%  NaCl infusion (0 mLs Intravenous Stopped 8/27/23 1655)   iohexoL (OMNIPAQUE 350) injection 75 mL (75 mLs Intravenous Given 8/27/23 1613)   ciprofloxacin HCl tablet 500 mg (500 mg Oral Given 8/27/23 1730)   metroNIDAZOLE tablet 500 mg (500  mg Oral Given 8/27/23 3201)     Medical Decision Making  Evaluation of a 31 yo female with fever, chills, myalgias with rectal pain.   Recently dx with possible anal abscess and is currently taking clindamycin. + rectal pain with BM this afternoon with bloody tissue.  Presents with stable vital signs.  Afebrile.  Patient has no palpable abscess or internal hemorrhoid on exam.  No induration.    Differential diagnosis includes perirectal abscess, internal hemorrhoids, anal polyp, proctitis, diverticulitis, colitis, COVID-19, influenza    Problems Addressed:  Chest tightness: acute illness or injury     Details: Negative EKG  Proctitis: acute illness or injury     Details: Cipro/Flagyl  Outpatient referral to Colorectal placed for follow-up  Continue Proctofoam at home for pain control  OTC stool softeners     Amount and/or Complexity of Data Reviewed  Labs: ordered.     Details: Mild leukocytosis with WBC of 14.  CMP is grossly normal.  UA without signs of infection.  Radiology: ordered.     Details: CT abdomen pelvis shows   1. Possible constipation.  2. Small fat containing umbilical hernia.  3. Incomplete distension of the rectum with possible minimal wall thickening.  There is a history of rectal pain.  Mild proctitis is not excluded.  Recommend clinical correlation.  ECG/medicine tests: ordered.     Details: NSR, rate 89. Normal MT and QRS intervals. No STEMI   No significant change when compared to EKG of 02/21'  Discussion of management or test interpretation with external provider(s): Results reviewed with patient.  Instructed to DC clindamycin, will change to Cipro/Flagyl.  Continue Proctofoam at home and over-the-counter stool softeners.  Outpatient referral to Colorectal placed for follow-up. Patient/caregiver voices understanding and feels comfortable with discharge plan.      The patient acknowledges that close follow up with medical provider is required. Instructed to follow up with PCP within 2 days.  Patient was given specific return precautions. The patient agrees to comply with all instruction and directions given in the ER.      Risk  Prescription drug management.                               Clinical Impression:   Final diagnoses:  [K62.89] Proctitis (Primary)  [R07.89] Chest tightness        ED Disposition Condition    Discharge Stable          ED Prescriptions       Medication Sig Dispense Start Date End Date Auth. Provider    ciprofloxacin HCl (CIPRO) 500 MG tablet Take 1 tablet (500 mg total) by mouth every 12 (twelve) hours. for 7 days 14 tablet 8/27/2023 9/3/2023 Joya Barros NP    metroNIDAZOLE (FLAGYL) 500 MG tablet Take 1 tablet (500 mg total) by mouth 3 (three) times daily. for 7 days 21 tablet 8/27/2023 9/3/2023 Joya Barros NP          Follow-up Information       Follow up With Specialties Details Why Contact Info    Lisa Fung MD Internal Medicine Schedule an appointment as soon as possible for a visit in 2 days  [address] [place] [id number]  [phone]                  Joya Barros NP  08/27/23 1930

## 2023-08-28 ENCOUNTER — TELEPHONE (OUTPATIENT)
Dept: ENDOSCOPY | Facility: HOSPITAL | Age: 33
End: 2023-08-28
Payer: COMMERCIAL

## 2023-08-28 ENCOUNTER — OFFICE VISIT (OUTPATIENT)
Dept: SURGERY | Facility: CLINIC | Age: 33
End: 2023-08-28
Payer: COMMERCIAL

## 2023-08-28 VITALS — BODY MASS INDEX: 33.23 KG/M2 | HEIGHT: 61 IN | WEIGHT: 176 LBS

## 2023-08-28 DIAGNOSIS — R19.7 DIARRHEA, UNSPECIFIED TYPE: ICD-10-CM

## 2023-08-28 DIAGNOSIS — K62.5 RECTAL BLEEDING: Primary | ICD-10-CM

## 2023-08-28 DIAGNOSIS — K62.89 PROCTITIS: Primary | ICD-10-CM

## 2023-08-28 DIAGNOSIS — R50.9 FEVER, UNSPECIFIED FEVER CAUSE: ICD-10-CM

## 2023-08-28 DIAGNOSIS — K62.89 PROCTALGIA: ICD-10-CM

## 2023-08-28 PROCEDURE — 1160F RVW MEDS BY RX/DR IN RCRD: CPT | Mod: CPTII,S$GLB,, | Performed by: COLON & RECTAL SURGERY

## 2023-08-28 PROCEDURE — 99203 OFFICE O/P NEW LOW 30 MIN: CPT | Mod: S$GLB,,, | Performed by: COLON & RECTAL SURGERY

## 2023-08-28 PROCEDURE — 99203 PR OFFICE/OUTPT VISIT, NEW, LEVL III, 30-44 MIN: ICD-10-PCS | Mod: S$GLB,,, | Performed by: COLON & RECTAL SURGERY

## 2023-08-28 PROCEDURE — 1160F PR REVIEW ALL MEDS BY PRESCRIBER/CLIN PHARMACIST DOCUMENTED: ICD-10-PCS | Mod: CPTII,S$GLB,, | Performed by: COLON & RECTAL SURGERY

## 2023-08-28 PROCEDURE — 99999 PR PBB SHADOW E&M-EST. PATIENT-LVL II: ICD-10-PCS | Mod: PBBFAC,,, | Performed by: COLON & RECTAL SURGERY

## 2023-08-28 PROCEDURE — 1159F MED LIST DOCD IN RCRD: CPT | Mod: CPTII,S$GLB,, | Performed by: COLON & RECTAL SURGERY

## 2023-08-28 PROCEDURE — 1159F PR MEDICATION LIST DOCUMENTED IN MEDICAL RECORD: ICD-10-PCS | Mod: CPTII,S$GLB,, | Performed by: COLON & RECTAL SURGERY

## 2023-08-28 PROCEDURE — 99999 PR PBB SHADOW E&M-EST. PATIENT-LVL II: CPT | Mod: PBBFAC,,, | Performed by: COLON & RECTAL SURGERY

## 2023-08-28 RX ORDER — POLYETHYLENE GLYCOL 3350, SODIUM SULFATE ANHYDROUS, SODIUM BICARBONATE, SODIUM CHLORIDE, POTASSIUM CHLORIDE 236; 22.74; 6.74; 5.86; 2.97 G/4L; G/4L; G/4L; G/4L; G/4L
4 POWDER, FOR SOLUTION ORAL ONCE
Qty: 4000 ML | Refills: 0 | Status: SHIPPED | OUTPATIENT
Start: 2023-08-28 | End: 2023-08-28

## 2023-08-28 NOTE — TELEPHONE ENCOUNTER
"----- Message from Neel Wang MD sent at 8/28/2023 10:04 AM CDT -----  Regarding: Urgent colonoscopy  Procedure: Colonoscopy    Diagnosis: Rectal bleeding, Diarrhea, and new onset proctalgia, fever, concern for ulcerative proctitis    Procedure Timing: < 1 week    #If within 4 weeks selected, please oracio as high priority#    #If greater than 12 weeks, please select "5-12 weeks" and delay sending until 2 months prior to requested date#     Provider: Any [facility] provider    Location: No Preference    Additional Scheduling Information: No scheduling concerns    Prep Specifications:Standard prep    Is the patient taking a GLP-1 Agonist:no    Have you attached a patient to this message: yes     "

## 2023-08-28 NOTE — TELEPHONE ENCOUNTER
Contacted the patient to schedule an endoscopy procedure(s) 8/28/23. The patient did not answer the call and left a voice message requesting a call back.

## 2023-08-28 NOTE — PLAN OF CARE
Spoke to pt to schedule procedure(s) Colonoscopy       Physician to perform procedure(s) Dr. NIMO Fernandez  Date of Procedure (s) 8/29/23  Arrival Time 8:40 AM  Time of Procedure(s) 9:40 AM   Location of Procedure(s) [facility] [facility]  Type of Rx Prep sent to patient: PEG  Instructions provided to patient via [facility]    Patient was informed on the following information and verbalized understanding. Screening questionnaire reviewed with patient and complete. If procedure requires anesthesia, a responsible adult needs to be present to accompany the patient home, patient cannot drive after receiving anesthesia. Appointment details are tentative, especially check-in time. Patient will receive a prep-op call 4 days prior to confirm check-in time for procedure. If applicable the patient should contact their pharmacy to verify Rx for procedure prep is ready for pick-up. Patient was advised to call the scheduling department at [phone] if pharmacy states no Rx is available. Patient was advised to call the endoscopy scheduling department if any questions or concerns arise.      [facility] Scheduling Department

## 2023-08-28 NOTE — TELEPHONE ENCOUNTER
Spoke to pt to schedule procedure(s) Colonoscopy       Physician to perform procedure(s) Dr. NIMO Fernandez  Date of Procedure (s) 8/29/23  Arrival Time 8:40 AM  Time of Procedure(s) 9:40 AM   Location of Procedure(s) [facility] [address] Floor  Type of Rx Prep sent to patient: PEG  Instructions provided to patient via [facility]    Patient was informed on the following information and verbalized understanding. Screening questionnaire reviewed with patient and complete. If procedure requires anesthesia, a responsible adult needs to be present to accompany the patient home, patient cannot drive after receiving anesthesia. Appointment details are tentative, especially check-in time. Patient will receive a prep-op call 4 days prior to confirm check-in time for procedure. If applicable the patient should contact their pharmacy to verify Rx for procedure prep is ready for pick-up. Patient was advised to call the scheduling department at [phone] if pharmacy states no Rx is available. Patient was advised to call the endoscopy scheduling department if any questions or concerns arise.      [facility] Scheduling Department

## 2023-08-28 NOTE — PROGRESS NOTES
Subjective:       Patient ID: Keisha Mena is a 32 y.o. female.    Chief Complaint: Rectal Bleeding and Rectal Pain    HPI  Keisha Mena is a 32 year old female with a  1+ month hx of rectal pain/pressure, yesterday developed worsening pain as well as bleeding, diarrhea, mucous discharge, fever.   She had previously been evaluated by her PCP on 08/11/23 - treated with clindamycin and proctofoam.  Seen in ED yesterday at [facility], CT showed incomplete distension of the rectum with possible minimal wall thickening suspicious for proctitis, no evidence of abscess seen.  (Images personally reviewed.)  She was discharged from ED with Cipro/Flagyl, told to follow-up with CRS.    Today she reports continued loose stools with blood/mucous.  +Rectal pain with BM's. No abdominal pain.  No further fevers.    Last colonoscopy - none  Negative family hx of CRC or IBD.      Review of patient's allergies indicates:  No Known Allergies    Past Medical History:   Diagnosis Date    Anxiety     Female bladder prolapse     Hypertension        Past Surgical History:   Procedure Laterality Date    DIAGNOSTIC LAPAROSCOPY N/A 2/7/2021    Procedure: LAPAROSCOPY, DIAGNOSTIC;  Surgeon: Tea Schneider MD;  Location: [facility] OR;  Service: OB/GYN;  Laterality: N/A;    LAPAROSCOPIC SALPINGECTOMY Left 2/7/2021    Procedure: SALPINGECTOMY, LAPAROSCOPIC;  Surgeon: Tea Schneider MD;  Location: [facility] OR;  Service: OB/GYN;  Laterality: Left;       Current Outpatient Medications   Medication Sig Dispense Refill    ciprofloxacin HCl (CIPRO) 500 MG tablet Take 1 tablet (500 mg total) by mouth every 12 (twelve) hours. for 7 days 14 tablet 0    labetaloL (NORMODYNE) 100 MG tablet TAKE 1 TABLET BY MOUTH TWICE A  tablet 3    metroNIDAZOLE (FLAGYL) 500 MG tablet Take 1 tablet (500 mg total) by mouth 3 (three) times daily. for 7 days 21 tablet 0    PNV Comb12/Iron Cb/FA1/DSS/DHA (PRENATAL 12-IRON-FA1-DSS-DHA ORAL) Take by mouth.      polyethylene  glycol (GOLYTELY) 236-22.74-6.74 -5.86 gram suspension Take 4,000 mLs (4 L total) by mouth once. Take as instructed by Endoscopy nurse  for 1 dose 4000 mL 0    pramoxine-hydrocortisone (PROCTOCREAM-HC) 1-1 % rectal cream Place rectally 3 (three) times daily. 30 g 1     No current facility-administered medications for this visit.       Family History   Problem Relation Age of Onset    Hypertension Mother     Alcohol abuse Father         history of s/p liver transplant    Breast cancer Neg Hx     Colon cancer Neg Hx     Ovarian cancer Neg Hx        Social History     Socioeconomic History    Marital status:    Tobacco Use    Smoking status: Never    Smokeless tobacco: Never   Substance and Sexual Activity    Alcohol use: No    Drug use: No    Sexual activity: Yes     Partners: Male     Birth control/protection: None     Comment:      Social Determinants of Health     Financial Resource Strain: Low Risk  (5/20/2020)    Overall Financial Resource Strain (CARDIA)     Difficulty of Paying Living Expenses: Not hard at all   Food Insecurity: No Food Insecurity (5/20/2020)    Hunger Vital Sign     Worried About Running Out of Food in the Last Year: Never true     Ran Out of Food in the Last Year: Never true   Transportation Needs: No Transportation Needs (5/20/2020)    PRAPARE - Transportation     Lack of Transportation (Medical): No     Lack of Transportation (Non-Medical): No   Physical Activity: Insufficiently Active (5/20/2020)    Exercise Vital Sign     Days of Exercise per Week: 1 day     Minutes of Exercise per Session: 10 min   Stress: Stress Concern Present (5/20/2020)    [employer] - Occupational Stress Questionnaire     Feeling of Stress : To some extent   Social Connections: Unknown (5/20/2020)    Social Connection and Isolation Panel [NHANES]     Frequency of Communication with Friends and Family: More than three times a week     Frequency of Social Gatherings  with Friends and Family: Once a week     Active Member of Clubs or Organizations: No     Attends Club or Organization Meetings: Never     Marital Status:        Review of Systems   Constitutional:  Positive for fever. Negative for chills.   HENT:  Negative for congestion and sore throat.    Eyes:  Negative for visual disturbance.   Respiratory:  Negative for cough and shortness of breath.    Cardiovascular:  Negative for chest pain and palpitations.   Gastrointestinal:  Positive for blood in stool, diarrhea and rectal pain. Negative for abdominal distention, abdominal pain, anal bleeding, constipation, nausea and vomiting.   Endocrine: Negative for cold intolerance and heat intolerance.   Genitourinary:  Negative for dysuria and frequency.   Musculoskeletal:  Negative for arthralgias, back pain and neck pain.   Skin:  Negative for rash.   Allergic/Immunologic: Negative for immunocompromised state.   Neurological:  Negative for dizziness, light-headedness and headaches.   Hematological:  Does not bruise/bleed easily.   Psychiatric/Behavioral:  Negative for confusion. The patient is not nervous/anxious.        Objective:      Physical Exam  Constitutional:       Appearance: She is well-developed.   HENT:      Head: Normocephalic.   Pulmonary:      Effort: Pulmonary effort is normal. No respiratory distress.   Abdominal:      General: Bowel sounds are normal. There is no distension.      Palpations: Abdomen is soft. There is no mass.      Tenderness: There is no abdominal tenderness. There is no guarding or rebound.   Genitourinary:     Comments: Perineum - normal perianal skin, no mass, no fissure, no external hemorrhoids, no perianal erythema/induration/fluctuance  TERRY - good tone, no mass, +circumferential tenderness/bogginess of rectal wall but no obvious fluctuance or point tenderness  Musculoskeletal:         General: Normal range of motion.   Skin:     General: Skin is warm and dry.   Neurological:       Mental Status: She is alert and oriented to person, place, and time.         Lab Results   Component Value Date    WBC 14.18 (H) 08/27/2023    HGB 13.7 08/27/2023    HCT 39.3 08/27/2023    MCV 91 08/27/2023     08/27/2023     BMP  Lab Results   Component Value Date     08/27/2023    K 4.0 08/27/2023     08/27/2023    CO2 19 (L) 08/27/2023    BUN 14 08/27/2023    CREATININE 0.9 08/27/2023    CALCIUM 9.6 08/27/2023    ANIONGAP 13 08/27/2023    ESTGFRAFRICA >60 02/10/2021    EGFRNONAA >60 02/10/2021     CMP  Sodium   Date Value Ref Range Status   08/27/2023 139 136 - 145 mmol/L Final     Potassium   Date Value Ref Range Status   08/27/2023 4.0 3.5 - 5.1 mmol/L Final     Chloride   Date Value Ref Range Status   08/27/2023 107 95 - 110 mmol/L Final     CO2   Date Value Ref Range Status   08/27/2023 19 (L) 23 - 29 mmol/L Final     Glucose   Date Value Ref Range Status   08/27/2023 84 70 - 110 mg/dL Final     BUN   Date Value Ref Range Status   08/27/2023 14 6 - 20 mg/dL Final     Creatinine   Date Value Ref Range Status   08/27/2023 0.9 0.5 - 1.4 mg/dL Final     Calcium   Date Value Ref Range Status   08/27/2023 9.6 8.7 - 10.5 mg/dL Final     Total Protein   Date Value Ref Range Status   08/27/2023 7.8 6.0 - 8.4 g/dL Final     Albumin   Date Value Ref Range Status   08/27/2023 4.4 3.5 - 5.2 g/dL Final     Total Bilirubin   Date Value Ref Range Status   08/27/2023 0.5 0.1 - 1.0 mg/dL Final     Comment:     For infants and newborns, interpretation of results should be based  on gestational age, weight and in agreement with clinical  observations.    Premature Infant recommended reference ranges:  Up to 24 hours.............<8.0 mg/dL  Up to 48 hours............<12.0 mg/dL  3-5 days..................<15.0 mg/dL  6-29 days.................<15.0 mg/dL       Alkaline Phosphatase   Date Value Ref Range Status   08/27/2023 56 55 - 135 U/L Final     AST   Date Value Ref Range Status   08/27/2023 22 10 - 40 U/L  "Final     ALT   Date Value Ref Range Status   08/27/2023 15 10 - 44 U/L Final     Anion Gap   Date Value Ref Range Status   08/27/2023 13 8 - 16 mmol/L Final     eGFR if    Date Value Ref Range Status   02/10/2021 >60 >60 mL/min/1.73 m^2 Final     eGFR if non    Date Value Ref Range Status   02/10/2021 >60 >60 mL/min/1.73 m^2 Final     Comment:     Calculation used to obtain the estimated glomerular filtration  rate (eGFR) is the CKD-EPI equation.        No results found for: "CEA"        Assessment:       1. Proctitis    -1 month history of rectal pain/proctalgia - no improvement with Clinda/Proctfoam  -onset yesterday of fever, diarrhea mixed with blood/mucous, CT shows circumferential rectal wall thickening    Plan:   -Will schedule for colonoscopy ASAP  -Continue cipro/flagyl pending colonoscopy  -Patient advised to call if symptoms worsen while awaiting colonoscopy    Neel Wang MD, FACS, FASCRS  , Department of Colon & Rectal Surgery     This note was created using voice recognition software, and may contain some unrecognized transcriptional errors.      "

## 2023-08-29 ENCOUNTER — ANESTHESIA EVENT (OUTPATIENT)
Dept: ENDOSCOPY | Facility: HOSPITAL | Age: 33
End: 2023-08-29
Payer: COMMERCIAL

## 2023-08-29 ENCOUNTER — ANESTHESIA (OUTPATIENT)
Dept: ENDOSCOPY | Facility: HOSPITAL | Age: 33
End: 2023-08-29
Payer: COMMERCIAL

## 2023-08-29 ENCOUNTER — HOSPITAL ENCOUNTER (OUTPATIENT)
Facility: HOSPITAL | Age: 33
Discharge: HOME | End: 2023-08-29
Attending: STUDENT IN AN ORGANIZED HEALTH CARE EDUCATION/TRAINING PROGRAM | Admitting: STUDENT IN AN ORGANIZED HEALTH CARE EDUCATION/TRAINING PROGRAM
Payer: COMMERCIAL

## 2023-08-29 VITALS
HEART RATE: 69 BPM | DIASTOLIC BLOOD PRESSURE: 79 MMHG | TEMPERATURE: 97 F | BODY MASS INDEX: 33.23 KG/M2 | WEIGHT: 176 LBS | HEIGHT: 61 IN | OXYGEN SATURATION: 100 % | RESPIRATION RATE: 20 BRPM | SYSTOLIC BLOOD PRESSURE: 135 MMHG

## 2023-08-29 DIAGNOSIS — K62.5 RECTAL BLEEDING: Primary | ICD-10-CM

## 2023-08-29 DIAGNOSIS — Z12.11 ENCOUNTER FOR SCREENING COLONOSCOPY: ICD-10-CM

## 2023-08-29 PROCEDURE — 37000009 HC ANESTHESIA EA ADD 15 MINS: Performed by: STUDENT IN AN ORGANIZED HEALTH CARE EDUCATION/TRAINING PROGRAM

## 2023-08-29 PROCEDURE — E9220 PRA ENDO ANESTHESIA: HCPCS | Mod: ,,, | Performed by: NURSE ANESTHETIST, CERTIFIED REGISTERED

## 2023-08-29 PROCEDURE — 45380 COLONOSCOPY AND BIOPSY: CPT | Mod: ,,, | Performed by: STUDENT IN AN ORGANIZED HEALTH CARE EDUCATION/TRAINING PROGRAM

## 2023-08-29 PROCEDURE — 88342 IMHCHEM/IMCYTCHM 1ST ANTB: CPT | Mod: 26,,, | Performed by: PATHOLOGY

## 2023-08-29 PROCEDURE — 25000003 PHARM REV CODE 250: Performed by: NURSE ANESTHETIST, CERTIFIED REGISTERED

## 2023-08-29 PROCEDURE — 27201012 HC FORCEPS, HOT/COLD, DISP: Performed by: STUDENT IN AN ORGANIZED HEALTH CARE EDUCATION/TRAINING PROGRAM

## 2023-08-29 PROCEDURE — 45380 PR COLONOSCOPY,BIOPSY: ICD-10-PCS | Mod: ,,, | Performed by: STUDENT IN AN ORGANIZED HEALTH CARE EDUCATION/TRAINING PROGRAM

## 2023-08-29 PROCEDURE — 88342 CHG IMMUNOCYTOCHEMISTRY: ICD-10-PCS | Mod: 26,,, | Performed by: PATHOLOGY

## 2023-08-29 PROCEDURE — E9220 PRA ENDO ANESTHESIA: ICD-10-PCS | Mod: ,,, | Performed by: NURSE ANESTHETIST, CERTIFIED REGISTERED

## 2023-08-29 PROCEDURE — 88305 TISSUE EXAM BY PATHOLOGIST: CPT | Performed by: PATHOLOGY

## 2023-08-29 PROCEDURE — 37000008 HC ANESTHESIA 1ST 15 MINUTES: Performed by: STUDENT IN AN ORGANIZED HEALTH CARE EDUCATION/TRAINING PROGRAM

## 2023-08-29 PROCEDURE — 88305 TISSUE EXAM BY PATHOLOGIST: CPT | Mod: 26,,, | Performed by: PATHOLOGY

## 2023-08-29 PROCEDURE — 88305 TISSUE EXAM BY PATHOLOGIST: ICD-10-PCS | Mod: 26,,, | Performed by: PATHOLOGY

## 2023-08-29 PROCEDURE — 88342 IMHCHEM/IMCYTCHM 1ST ANTB: CPT | Performed by: PATHOLOGY

## 2023-08-29 PROCEDURE — 63600175 PHARM REV CODE 636 W HCPCS: Performed by: NURSE ANESTHETIST, CERTIFIED REGISTERED

## 2023-08-29 PROCEDURE — 45380 COLONOSCOPY AND BIOPSY: CPT | Performed by: STUDENT IN AN ORGANIZED HEALTH CARE EDUCATION/TRAINING PROGRAM

## 2023-08-29 PROCEDURE — 25000003 PHARM REV CODE 250: Performed by: STUDENT IN AN ORGANIZED HEALTH CARE EDUCATION/TRAINING PROGRAM

## 2023-08-29 RX ORDER — LIDOCAINE HYDROCHLORIDE 20 MG/ML
INJECTION INTRAVENOUS
Status: DISCONTINUED | OUTPATIENT
Start: 2023-08-29 | End: 2023-08-29

## 2023-08-29 RX ORDER — PROPOFOL 10 MG/ML
VIAL (ML) INTRAVENOUS
Status: DISCONTINUED | OUTPATIENT
Start: 2023-08-29 | End: 2023-08-29

## 2023-08-29 RX ORDER — PROPOFOL 10 MG/ML
VIAL (ML) INTRAVENOUS CONTINUOUS PRN
Status: DISCONTINUED | OUTPATIENT
Start: 2023-08-29 | End: 2023-08-29

## 2023-08-29 RX ORDER — SODIUM CHLORIDE 9 MG/ML
INJECTION, SOLUTION INTRAVENOUS CONTINUOUS
Status: DISCONTINUED | OUTPATIENT
Start: 2023-08-29 | End: 2023-08-29 | Stop reason: HOSPADM

## 2023-08-29 RX ADMIN — LIDOCAINE HYDROCHLORIDE 50 MG: 20 INJECTION INTRAVENOUS at 09:08

## 2023-08-29 RX ADMIN — PROPOFOL 150 MCG/KG/MIN: 10 INJECTION, EMULSION INTRAVENOUS at 09:08

## 2023-08-29 RX ADMIN — PROPOFOL 80 MG: 10 INJECTION, EMULSION INTRAVENOUS at 09:08

## 2023-08-29 RX ADMIN — SODIUM CHLORIDE: 9 INJECTION, SOLUTION INTRAVENOUS at 09:08

## 2023-08-29 NOTE — TRANSFER OF CARE
"Anesthesia Transfer of Care Note    Patient: Keisha Mena    Procedure(s) Performed: Procedure(s) (LRB):  COLONOSCOPY (N/A)    Patient location: GI    Anesthesia Type: general    Transport from OR: Transported from OR on room air with adequate spontaneous ventilation    Post pain: adequate analgesia    Post assessment: no apparent anesthetic complications and tolerated procedure well    Post vital signs: stable    Level of consciousness: awake, alert and oriented    Nausea/Vomiting: no nausea/vomiting    Complications: none    Transfer of care protocol was followed      Last vitals:   Visit Vitals  /64   Pulse 84   Temp 36.6 °C (97.9 °F) (Temporal)   Resp 14   Ht 5' 1" (1.549 m)   Wt 79.8 kg (176 lb)   LMP 08/09/2023 (Exact Date)   SpO2 100%   Breastfeeding No   BMI 33.25 kg/m²     "

## 2023-08-29 NOTE — PLAN OF CARE
Discharge criteria met. Reviewed discharge instructions with patient and they verbalized understanding.  Discharged home with responsible adult.

## 2023-08-29 NOTE — PLAN OF CARE
Explained procedure, pt watched colon video at home. Anxious about findings. Provided calming environment.

## 2023-08-29 NOTE — H&P
[facility]  Colon and Rectal Surgery    [address]  [place], [place]  Tel: [phone]  Fax: [phone]  [email or web address]/   MD Rory Denton MD Brian Kann, MD W. Forrest Johnston, MD Matthew Giglia, MD Jennifer Paruch, MD William Kethman, MD Danielle Kay, MD     Patient name: Keisha Mena   YOB: 1990   MRN: [id number]  Date of procedure: 08/29/2023    Procedure: Colonoscopy  Indications: Recent blood and mucus in her stool - loose, seen recently by Ms. Bell and Dr. Wang, no family history of CRC or IBD    No history of colonoscopy    The patient was informed of the availability of a certified  without charge. A certified  was not necessary for this visit.    Sedation plan: MAC  ASA: ASA 2 - Patient with mild systemic disease with no functional limitations    Review of Systems  See above    Past Medical History:   Diagnosis Date    Anxiety     Ectopic pregnancy 02/06/2021    Female bladder prolapse     Hypertension      Past Surgical History:   Procedure Laterality Date    DIAGNOSTIC LAPAROSCOPY N/A 2/7/2021    Procedure: LAPAROSCOPY, DIAGNOSTIC;  Surgeon: Tea Schneider MD;  Location: [facility] OR;  Service: OB/GYN;  Laterality: N/A;    LAPAROSCOPIC SALPINGECTOMY Left 2/7/2021    Procedure: SALPINGECTOMY, LAPAROSCOPIC;  Surgeon: Tea Schneider MD;  Location: [facility] OR;  Service: OB/GYN;  Laterality: Left;     Family History   Problem Relation Age of Onset    Hypertension Mother     Alcohol abuse Father         history of s/p liver transplant    Breast cancer Neg Hx     Colon cancer Neg Hx     Ovarian cancer Neg Hx      Social History     Tobacco Use    Smoking status: Never    Smokeless tobacco: Never   Substance Use Topics    Alcohol use: No    Drug use: No     Review of patient's allergies indicates:  No Known Allergies    Prior to Admission medications    Medication Sig Start Date End Date  "Taking? Authorizing Provider   ciprofloxacin HCl (CIPRO) 500 MG tablet Take 1 tablet (500 mg total) by mouth every 12 (twelve) hours. for 7 days 8/27/23 9/3/23 Yes Joya Barros NP   labetaloL (NORMODYNE) 100 MG tablet TAKE 1 TABLET BY MOUTH TWICE A DAY 4/26/23  Yes Lisa Fung MD   metroNIDAZOLE (FLAGYL) 500 MG tablet Take 1 tablet (500 mg total) by mouth 3 (three) times daily. for 7 days 8/27/23 9/3/23 Yes Joya Barros NP   PNV Comb12/Iron Cb/FA1/DSS/DHA (PRENATAL 12-IRON-FA1-DSS-DHA ORAL) Take by mouth.   Yes Provider, Historical   polyethylene glycol (GOLYTELY) 236-22.74-6.74 -5.86 gram suspension Take 4,000 mLs (4 L total) by mouth once. Take as instructed by Endoscopy nurse  for 1 dose 8/28/23 8/28/23  González Fernandez MD   pramoxine-hydrocortisone (PROCTOCREAM-HC) 1-1 % rectal cream Place rectally 3 (three) times daily. 8/11/23   Lisa Fung MD       Physical Examination  /78 (BP Location: Left arm, Patient Position: Lying)   Pulse 78   Temp 97.9 °F (36.6 °C) (Temporal)   Resp 18   Ht 5' 1" (1.549 m)   Wt 79.8 kg (176 lb)   LMP 08/09/2023 (Exact Date) Comment: HCG negative 8-29-23  SpO2 99%   Breastfeeding No   BMI 33.25 kg/m²      Constitutional: well developed, no cough, no dyspnea, alert, and no acute distress    Head: Normocephalic, no lesions, without obvious abnormality  Eye: Normal external eye, conjunctiva, and lids, PERRL  Cardiovascular: regular rate and regular rhythm  Respiratory: normal air entry  Gastrointestinal: soft, non-tender, without masses or organomegaly  Neurologic: alert, oriented, normal speech, no focal findings or movement disorder noted  Psychiatric: appropriate, normal mood    Plan of Care    It was a pleasure meeting Ms. Mena today - we will plan to perform a colonoscopy with monitored anesthesia care. The details of the procedure, the possible need for biopsy or polypectomy and the potential risks including bleeding, perforation, missed " polyps were discussed in detail and they consented to undergo the procedure.      González Fernandez MD, FACS, [facility]  Department of Colon & Rectal Surgery  [facility]

## 2023-08-29 NOTE — PROVATION PATIENT INSTRUCTIONS
Discharge Summary/Instructions after an Endoscopic Procedure  Patient Name: Keisha Mena  Patient MRN: [id number]  Patient YOB: 1990  Tuesday, August 29, 2023  González Fernandez MD  Dear patient,  As a result of recent federal legislation (The Federal Cures Act), you may   receive lab or pathology results from your procedure in your [facility]   account before your physician is able to contact you. Your physician or   their representative will relay the results to you with their   recommendations at their soonest availability.  Thank you,  RESTRICTIONS:  During your procedure today, you received medications for sedation.  These   medications may affect your judgment, balance and coordination.  Therefore,   for 24 hours, you have the following restrictions:   - DO NOT drive a car, operate machinery, make legal/financial decisions,   sign important papers or drink alcohol.    ACTIVITY:  Today: no heavy lifting, straining or running due to procedural   sedation/anesthesia.  The following day: return to full activity including work.  DIET:  Eat and drink normally unless instructed otherwise.     TREATMENT FOR COMMON SIDE EFFECTS:  - Mild abdominal pain, nausea, belching, bloating or excessive gas:  rest,   eat lightly and use a heating pad.  - Sore Throat: treat with throat lozenges and/or gargle with warm salt   water.  - Because air was used during the procedure, expelling large amounts of air   from your rectum or belching is normal.  - If a bowel prep was taken, you may not have a bowel movement for 1-3 days.    This is normal.  SYMPTOMS TO WATCH FOR AND REPORT TO YOUR PHYSICIAN:  1. Abdominal pain or bloating, other than gas cramps.  2. Chest pain.  3. Back pain.  4. Signs of infection such as: chills or fever occurring within 24 hours   after the procedure.  5. Rectal bleeding, which would show as bright red, maroon, or black stools.   (A tablespoon of blood from the rectum is not serious, especially  if   hemorrhoids are present.)  6. Vomiting.  7. Weakness or dizziness.  GO DIRECTLY TO THE NEAREST EMERGENCY ROOM IF YOU HAVE ANY OF THE FOLLOWING:      Difficulty breathing              Chills and/or fever over 101 F   Persistent vomiting and/or vomiting blood   Severe abdominal pain   Severe chest pain   Black, tarry stools   Bleeding- more than one tablespoon   Any other symptom or condition that you feel may need urgent attention  Your doctor recommends these additional instructions:  If any biopsies were taken, your doctors clinic will contact you in 1 to 2   weeks with any results.  - Discharge patient to home.   - Resume previous diet.   - Continue present medications.   - Await pathology results.   - Repeat colonoscopy for surveillance based on pathology results.   - Refer to a gastroenterologist at the next available appointment.   - Return to referring physician as previously scheduled.  For questions, problems or results please call your physician - González Fernandez MD at Work:  [phone].  [facility], EMERGENCY ROOM PHONE NUMBER: [phone]  IF A COMPLICATION OR EMERGENCY SITUATION ARISES AND YOU ARE UNABLE TO REACH   YOUR PHYSICIAN - GO DIRECTLY TO THE EMERGENCY ROOM.  MD González Willams MD  8/29/2023 10:11:38 AM  This report has been verified and signed electronically.  Dear patient,  As a result of recent federal legislation (The Federal Cures Act), you may   receive lab or pathology results from your procedure in your [facility]   account before your physician is able to contact you. Your physician or   their representative will relay the results to you with their   recommendations at their soonest availability.  Thank you,  PROVATION

## 2023-08-29 NOTE — ANESTHESIA PREPROCEDURE EVALUATION
08/29/2023  Keisha Mena is a 32 y.o., female.    Active Problem List with Overview Notes    Diagnosis Date Noted    Abnormal posture 11/14/2022    Cervical radiculopathy 10/05/2022    Class 1 obesity due to excess calories with serious comorbidity and body mass index (BMI) of 32.0 to 32.9 in adult 09/28/2022    Ruptured left tubal ectopic pregnancy causing hemoperitoneum 02/07/2021    Left tubal pregnancy 02/05/2021    Anxiety 01/20/2020    Gestational hypertension 11/14/2019     Past Surgical History:   Procedure Laterality Date    DIAGNOSTIC LAPAROSCOPY N/A 2/7/2021    Procedure: LAPAROSCOPY, DIAGNOSTIC;  Surgeon: Tea Schneider MD;  Location: [facility] OR;  Service: OB/GYN;  Laterality: N/A;    LAPAROSCOPIC SALPINGECTOMY Left 2/7/2021    Procedure: SALPINGECTOMY, LAPAROSCOPIC;  Surgeon: Tea Schneider MD;  Location: [facility] OR;  Service: OB/GYN;  Laterality: Left;     Results for orders placed or performed during the hospital encounter of 08/27/23   EKG 12-lead    Collection Time: 08/27/23  5:23 PM    Narrative    Test Reason : R07.9    Vent. Rate : 089 BPM     Atrial Rate : 089 BPM     P-R Int : 156 ms          QRS Dur : 074 ms      QT Int : 354 ms       P-R-T Axes : 068 080 060 degrees     QTc Int : 430 ms    Normal sinus rhythm  Normal ECG  When compared with ECG of 10-FEB-2021 12:53,  ST no longer depressed in Inferior leads  ST no longer depressed in Anterior-lateral leads  T wave inversion no longer evident in Inferior leads  T wave inversion no longer evident in Anterior-lateral leads  Confirmed by Eric LYNCH, Tyson LANDERS ([id number]) on 8/29/2023 8:46:40 AM    Referred By: [email or web address]   SELF           Confirmed By:Tyson eRyes MD       Pre-op Assessment    I have reviewed the Patient Summary Reports.    I have reviewed the NPO Status.   I have reviewed the Medications.     Review of  Systems  Anesthesia Hx:  No problems with previous Anesthesia    Social:  Non-Smoker, No Alcohol Use    Hematology/Oncology:  Hematology Normal   Oncology Normal     EENT/Dental:EENT/Dental Normal   Cardiovascular:   Hypertension ECG has been reviewed.    Pulmonary:  Pulmonary Normal    Renal/:  Renal/ Normal     Hepatic/GI:  Hepatic/GI Normal    Musculoskeletal:  Musculoskeletal Normal    Neurological:   Neuromuscular Disease,    Endocrine:  Endocrine Normal  Obesity / BMI > 30  Dermatological:  Skin Normal    Psych:   Psychiatric History anxiety          Physical Exam  General: Well nourished, Cooperative, Alert and Oriented    Airway:  Mallampati: II   Mouth Opening: Normal  TM Distance: Normal  Tongue: Normal  Neck ROM: Normal ROM    Dental:  Intact    Chest/Lungs:  Normal Respiratory Rate        Anesthesia Plan  Type of Anesthesia, risks & benefits discussed:    Anesthesia Type: Gen Natural Airway  Intra-op Monitoring Plan: Standard ASA Monitors  Post Op Pain Control Plan: multimodal analgesia  Induction:  IV  Informed Consent: Informed consent signed with the Patient and all parties understand the risks and agree with anesthesia plan.  All questions answered.   ASA Score: 2  Day of Surgery Review of History & Physical: H&P Update referred to the surgeon/provider.    Ready For Surgery From Anesthesia Perspective.     .

## 2023-09-01 ENCOUNTER — OFFICE VISIT (OUTPATIENT)
Dept: INTERNAL MEDICINE | Facility: CLINIC | Age: 33
End: 2023-09-01
Payer: COMMERCIAL

## 2023-09-01 VITALS
RESPIRATION RATE: 18 BRPM | DIASTOLIC BLOOD PRESSURE: 82 MMHG | SYSTOLIC BLOOD PRESSURE: 134 MMHG | BODY MASS INDEX: 32.76 KG/M2 | HEART RATE: 100 BPM | HEIGHT: 61 IN | OXYGEN SATURATION: 99 % | WEIGHT: 173.5 LBS

## 2023-09-01 DIAGNOSIS — K52.9 COLITIS: Primary | ICD-10-CM

## 2023-09-01 PROCEDURE — 3079F PR MOST RECENT DIASTOLIC BLOOD PRESSURE 80-89 MM HG: ICD-10-PCS | Mod: CPTII,S$GLB,, | Performed by: FAMILY MEDICINE

## 2023-09-01 PROCEDURE — 1159F MED LIST DOCD IN RCRD: CPT | Mod: CPTII,S$GLB,, | Performed by: FAMILY MEDICINE

## 2023-09-01 PROCEDURE — 99213 PR OFFICE/OUTPT VISIT, EST, LEVL III, 20-29 MIN: ICD-10-PCS | Mod: S$GLB,,, | Performed by: FAMILY MEDICINE

## 2023-09-01 PROCEDURE — 1160F PR REVIEW ALL MEDS BY PRESCRIBER/CLIN PHARMACIST DOCUMENTED: ICD-10-PCS | Mod: CPTII,S$GLB,, | Performed by: FAMILY MEDICINE

## 2023-09-01 PROCEDURE — 1160F RVW MEDS BY RX/DR IN RCRD: CPT | Mod: CPTII,S$GLB,, | Performed by: FAMILY MEDICINE

## 2023-09-01 PROCEDURE — 3075F PR MOST RECENT SYSTOLIC BLOOD PRESS GE 130-139MM HG: ICD-10-PCS | Mod: CPTII,S$GLB,, | Performed by: FAMILY MEDICINE

## 2023-09-01 PROCEDURE — 3008F BODY MASS INDEX DOCD: CPT | Mod: CPTII,S$GLB,, | Performed by: FAMILY MEDICINE

## 2023-09-01 PROCEDURE — 99999 PR PBB SHADOW E&M-EST. PATIENT-LVL III: CPT | Mod: PBBFAC,,, | Performed by: FAMILY MEDICINE

## 2023-09-01 PROCEDURE — 99999 PR PBB SHADOW E&M-EST. PATIENT-LVL III: ICD-10-PCS | Mod: PBBFAC,,, | Performed by: FAMILY MEDICINE

## 2023-09-01 PROCEDURE — 3075F SYST BP GE 130 - 139MM HG: CPT | Mod: CPTII,S$GLB,, | Performed by: FAMILY MEDICINE

## 2023-09-01 PROCEDURE — 99213 OFFICE O/P EST LOW 20 MIN: CPT | Mod: S$GLB,,, | Performed by: FAMILY MEDICINE

## 2023-09-01 PROCEDURE — 3008F PR BODY MASS INDEX (BMI) DOCUMENTED: ICD-10-PCS | Mod: CPTII,S$GLB,, | Performed by: FAMILY MEDICINE

## 2023-09-01 PROCEDURE — 3079F DIAST BP 80-89 MM HG: CPT | Mod: CPTII,S$GLB,, | Performed by: FAMILY MEDICINE

## 2023-09-01 PROCEDURE — 1159F PR MEDICATION LIST DOCUMENTED IN MEDICAL RECORD: ICD-10-PCS | Mod: CPTII,S$GLB,, | Performed by: FAMILY MEDICINE

## 2023-09-01 RX ORDER — LOPERAMIDE HCL 2 MG
2 TABLET ORAL 4 TIMES DAILY PRN
Qty: 15 TABLET | Refills: 0 | Status: SHIPPED | OUTPATIENT
Start: 2023-09-01 | End: 2023-09-15

## 2023-09-01 RX ORDER — SULFASALAZINE 500 MG/1
500 TABLET, DELAYED RELEASE ORAL 2 TIMES DAILY
Qty: 30 TABLET | Refills: 0 | Status: SHIPPED | OUTPATIENT
Start: 2023-09-01 | End: 2023-09-15

## 2023-09-01 RX ORDER — HYDROXYZINE HYDROCHLORIDE 25 MG/1
25 TABLET, FILM COATED ORAL
COMMUNITY

## 2023-09-01 NOTE — PROGRESS NOTES
Subjective:       Patient ID: Keisha Mena is a 32 y.o. female.    Chief Complaint: Follow-up (Pt here for ER f/u. )    Patient here for follow-up from the emergency room.  She is seen for rectal bleeding 1 week ago.  She was diagnosed with proctitis and started on Cipro and Flagyl.  She followed up with Colorectal surgery.  Colonoscopy revealed ulcers in the distal colon.  Biopsies are still pending.  The last 2 days she was feeling poor.  She is been having over 10 bowel movements per day.  She stopped taking the antibiotics and is starting to feel better.  She is scheduled to see GI next week.      Review of Systems   Constitutional:  Negative for activity change, chills, fatigue, fever and unexpected weight change.   HENT:  Negative for sore throat and trouble swallowing.    Respiratory:  Negative for cough, chest tightness and shortness of breath.    Cardiovascular:  Negative for chest pain and leg swelling.   Gastrointestinal:  Positive for anal bleeding, blood in stool and diarrhea. Negative for abdominal pain and nausea.   Endocrine: Negative for cold intolerance and heat intolerance.   Genitourinary:  Negative for difficulty urinating.   Musculoskeletal:  Negative for back pain and joint swelling.   Skin:  Negative for rash.   Neurological:  Negative for numbness.   Hematological:  Negative for adenopathy.   Psychiatric/Behavioral:  Negative for decreased concentration.        Objective:      Vitals:    09/01/23 1418   BP: 134/82   Pulse: 100   Resp: 18     Physical Exam  Constitutional:       General: She is not in acute distress.     Appearance: Normal appearance. She is well-developed. She is not diaphoretic.   HENT:      Head: Normocephalic and atraumatic.      Right Ear: Tympanic membrane, ear canal and external ear normal.      Left Ear: Tympanic membrane, ear canal and external ear normal.      Nose: Nose normal.      Mouth/Throat:      Pharynx: No oropharyngeal exudate.   Eyes:      General: No  scleral icterus.        Right eye: No discharge.         Left eye: No discharge.      Conjunctiva/sclera: Conjunctivae normal.      Pupils: Pupils are equal, round, and reactive to light.   Neck:      Thyroid: No thyromegaly.      Vascular: No JVD.      Trachea: No tracheal deviation.   Cardiovascular:      Rate and Rhythm: Normal rate and regular rhythm.      Heart sounds: Normal heart sounds. No murmur heard.     No friction rub. No gallop.   Pulmonary:      Effort: Pulmonary effort is normal. No respiratory distress.      Breath sounds: Normal breath sounds. No wheezing or rales.   Chest:      Chest wall: No tenderness.   Abdominal:      General: Bowel sounds are normal. There is no distension.      Palpations: Abdomen is soft. There is no mass.      Tenderness: There is no abdominal tenderness. There is no guarding or rebound.   Musculoskeletal:         General: No tenderness. Normal range of motion.      Cervical back: Normal range of motion and neck supple.      Right lower leg: No edema.      Left lower leg: No edema.   Lymphadenopathy:      Cervical: No cervical adenopathy.   Skin:     General: Skin is warm and dry.   Neurological:      General: No focal deficit present.      Mental Status: She is alert and oriented to person, place, and time.      Cranial Nerves: No cranial nerve deficit.      Motor: No abnormal muscle tone.      Coordination: Coordination normal.      Deep Tendon Reflexes: Reflexes are normal and symmetric. Reflexes normal.   Psychiatric:         Mood and Affect: Mood normal.         Behavior: Behavior normal.         Thought Content: Thought content normal.         Judgment: Judgment normal.         Assessment:       1. Colitis        Plan:   1. Colitis  Comments:  She probably has ulcerative colitis.  Waiting on biopsy results.  I will start her on low-dose sulfasalazine.  I will give her low-dose Lomotil.    Orders:  -     sulfaSALAzine (AZULFIDINE) 500 MG EC tablet; Take 1 tablet (500  mg total) by mouth 2 (two) times a day.  Dispense: 30 tablet; Refill: 0  -     loperamide (IMODIUM A-D) 2 mg Tab; Take 1 tablet (2 mg total) by mouth 4 (four) times daily as needed.  Dispense: 15 tablet; Refill: 0       Stop flagyl and cipro.  Drink plenty of fluids.  Keep appointment with Gastroenterology next week

## 2023-09-02 LAB
BACTERIA BLD CULT: NORMAL
BACTERIA BLD CULT: NORMAL

## 2023-09-03 ENCOUNTER — HOSPITAL ENCOUNTER (EMERGENCY)
Facility: HOSPITAL | Age: 33
Discharge: HOME | End: 2023-09-03
Attending: SURGERY
Payer: COMMERCIAL

## 2023-09-03 VITALS
TEMPERATURE: 98 F | DIASTOLIC BLOOD PRESSURE: 98 MMHG | HEART RATE: 88 BPM | SYSTOLIC BLOOD PRESSURE: 168 MMHG | HEIGHT: 61 IN | WEIGHT: 171.94 LBS | BODY MASS INDEX: 32.46 KG/M2 | OXYGEN SATURATION: 100 % | RESPIRATION RATE: 20 BRPM

## 2023-09-03 DIAGNOSIS — F41.9 ANXIETY: Primary | ICD-10-CM

## 2023-09-03 DIAGNOSIS — R00.2 PALPITATIONS: ICD-10-CM

## 2023-09-03 LAB
ALBUMIN SERPL BCP-MCNC: 4.3 G/DL (ref 3.5–5.2)
ALP SERPL-CCNC: 55 U/L (ref 55–135)
ALT SERPL W/O P-5'-P-CCNC: 11 U/L (ref 10–44)
AMPHET+METHAMPHET UR QL: NEGATIVE
ANION GAP SERPL CALC-SCNC: 9 MMOL/L (ref 8–16)
AST SERPL-CCNC: 17 U/L (ref 10–40)
B-HCG UR QL: NEGATIVE
BARBITURATES UR QL SCN>200 NG/ML: NEGATIVE
BASOPHILS # BLD AUTO: 0.03 K/UL (ref 0–0.2)
BASOPHILS NFR BLD: 0.4 % (ref 0–1.9)
BENZODIAZ UR QL SCN>200 NG/ML: NEGATIVE
BILIRUB SERPL-MCNC: 0.3 MG/DL (ref 0.1–1)
BILIRUB UR QL STRIP: NEGATIVE
BUN SERPL-MCNC: 18 MG/DL (ref 6–20)
BZE UR QL SCN: NEGATIVE
CALCIUM SERPL-MCNC: 9.4 MG/DL (ref 8.7–10.5)
CANNABINOIDS UR QL SCN: NEGATIVE
CHLORIDE SERPL-SCNC: 106 MMOL/L (ref 95–110)
CLARITY UR: CLEAR
CO2 SERPL-SCNC: 21 MMOL/L (ref 23–29)
COLOR UR: YELLOW
CREAT SERPL-MCNC: 1.1 MG/DL (ref 0.5–1.4)
CREAT UR-MCNC: 21.1 MG/DL (ref 15–325)
DIFFERENTIAL METHOD: ABNORMAL
EOSINOPHIL # BLD AUTO: 0.1 K/UL (ref 0–0.5)
EOSINOPHIL NFR BLD: 1.9 % (ref 0–8)
ERYTHROCYTE [DISTWIDTH] IN BLOOD BY AUTOMATED COUNT: 12.5 % (ref 11.5–14.5)
EST. GFR  (NO RACE VARIABLE): >60 ML/MIN/1.73 M^2
GLUCOSE SERPL-MCNC: 98 MG/DL (ref 70–110)
GLUCOSE UR QL STRIP: NEGATIVE
HCT VFR BLD AUTO: 36.6 % (ref 37–48.5)
HGB BLD-MCNC: 12.8 G/DL (ref 12–16)
HGB UR QL STRIP: NEGATIVE
IMM GRANULOCYTES # BLD AUTO: 0.01 K/UL (ref 0–0.04)
IMM GRANULOCYTES NFR BLD AUTO: 0.1 % (ref 0–0.5)
KETONES UR QL STRIP: NEGATIVE
LEUKOCYTE ESTERASE UR QL STRIP: NEGATIVE
LYMPHOCYTES # BLD AUTO: 2.5 K/UL (ref 1–4.8)
LYMPHOCYTES NFR BLD: 37.6 % (ref 18–48)
MCH RBC QN AUTO: 31.8 PG (ref 27–31)
MCHC RBC AUTO-ENTMCNC: 35 G/DL (ref 32–36)
MCV RBC AUTO: 91 FL (ref 82–98)
METHADONE UR QL SCN>300 NG/ML: NEGATIVE
MONOCYTES # BLD AUTO: 0.7 K/UL (ref 0.3–1)
MONOCYTES NFR BLD: 9.7 % (ref 4–15)
NEUTROPHILS # BLD AUTO: 3.4 K/UL (ref 1.8–7.7)
NEUTROPHILS NFR BLD: 50.3 % (ref 38–73)
NITRITE UR QL STRIP: NEGATIVE
NRBC BLD-RTO: 0 /100 WBC
OPIATES UR QL SCN: NEGATIVE
PCP UR QL SCN>25 NG/ML: NEGATIVE
PH UR STRIP: 6 [PH] (ref 5–8)
PLATELET # BLD AUTO: 211 K/UL (ref 150–450)
PMV BLD AUTO: 10.3 FL (ref 9.2–12.9)
POTASSIUM SERPL-SCNC: 3.8 MMOL/L (ref 3.5–5.1)
PROT SERPL-MCNC: 7.6 G/DL (ref 6–8.4)
PROT UR QL STRIP: NEGATIVE
RBC # BLD AUTO: 4.03 M/UL (ref 4–5.4)
SODIUM SERPL-SCNC: 136 MMOL/L (ref 136–145)
SP GR UR STRIP: <=1.005 (ref 1–1.03)
TOXICOLOGY INFORMATION: NORMAL
TROPONIN I SERPL DL<=0.01 NG/ML-MCNC: 0.01 NG/ML (ref 0–0.03)
URN SPEC COLLECT METH UR: ABNORMAL
UROBILINOGEN UR STRIP-ACNC: NEGATIVE EU/DL
WBC # BLD AUTO: 6.71 K/UL (ref 3.9–12.7)

## 2023-09-03 PROCEDURE — 99900031 HC PATIENT EDUCATION (STAT)

## 2023-09-03 PROCEDURE — 84484 ASSAY OF TROPONIN QUANT: CPT | Performed by: SURGERY

## 2023-09-03 PROCEDURE — 36415 COLL VENOUS BLD VENIPUNCTURE: CPT | Performed by: SURGERY

## 2023-09-03 PROCEDURE — 80053 COMPREHEN METABOLIC PANEL: CPT | Performed by: SURGERY

## 2023-09-03 PROCEDURE — 99285 EMERGENCY DEPT VISIT HI MDM: CPT | Mod: 25

## 2023-09-03 PROCEDURE — 80307 DRUG TEST PRSMV CHEM ANLYZR: CPT | Performed by: SURGERY

## 2023-09-03 PROCEDURE — 99900035 HC TECH TIME PER 15 MIN (STAT)

## 2023-09-03 PROCEDURE — 81003 URINALYSIS AUTO W/O SCOPE: CPT | Performed by: SURGERY

## 2023-09-03 PROCEDURE — 25000003 PHARM REV CODE 250: Performed by: SURGERY

## 2023-09-03 PROCEDURE — 93010 EKG 12-LEAD: ICD-10-PCS | Mod: ,,, | Performed by: INTERNAL MEDICINE

## 2023-09-03 PROCEDURE — 85025 COMPLETE CBC W/AUTO DIFF WBC: CPT | Performed by: SURGERY

## 2023-09-03 PROCEDURE — 93005 ELECTROCARDIOGRAM TRACING: CPT

## 2023-09-03 PROCEDURE — 81025 URINE PREGNANCY TEST: CPT | Performed by: SURGERY

## 2023-09-03 PROCEDURE — 93010 ELECTROCARDIOGRAM REPORT: CPT | Mod: ,,, | Performed by: INTERNAL MEDICINE

## 2023-09-03 RX ORDER — ASPIRIN 325 MG
325 TABLET ORAL
Status: COMPLETED | OUTPATIENT
Start: 2023-09-03 | End: 2023-09-03

## 2023-09-03 RX ADMIN — ASPIRIN 325 MG ORAL TABLET 325 MG: 325 PILL ORAL at 09:09

## 2023-09-04 NOTE — DISCHARGE INSTRUCTIONS
Patient will follow-up with [facility] locally on the next business day which is Tuesday after the labor day holiday, return with any concerns       Ney Prince M.D. 10:55 PM 9/3/2023

## 2023-09-04 NOTE — ED PROVIDER NOTES
"Encounter Date: 9/3/2023       History     Chief Complaint   Patient presents with    Chest Pain     Pt to ED with c/o palpitations while at airport, states felt a "jolt" in center of chest when everything became "black" and generalized weakness. Patient now states persistent chest pain  since. Also reports difficulty swallow with dry mouth.      Keisha Mena is a 32 y.o. female that presents after near-syncope tonight  Patient was at the airport, went to the bathroom & got dizzy with no LOC  Felt weak, got better, had on again off again palpitations since incident  Also had dry mouth & was sweating with a long history of significant anxiety  Patient is currently dealing with a possible diagnosis of ulcerative colitis  Has been under lot of stress, no cardiac history, normal sinus rhythm now        Review of patient's allergies indicates:  No Known Allergies  Past Medical History:   Diagnosis Date    Anxiety     Ectopic pregnancy 02/06/2021    Female bladder prolapse     Hypertension      Past Surgical History:   Procedure Laterality Date    COLONOSCOPY N/A 8/29/2023    Procedure: COLONOSCOPY;  Surgeon: González Fernandez MD;  Location: [facility] ([address] [place] [address]);  Service: Colon and Rectal;  Laterality: N/A;  Referred by: Dr. Wang  Prep: PEG  Route instructions sent: portal  Other concerns: FYI Pt seen by Dr. Wang today with notes that state: Plan  -colonoscopy within a week  -continue taking cipro/flagyl  - concern for ulcerative proctitis  SW    DIAGNOSTIC LAPAROSCOPY N/A 2/7/2021    Procedure: LAPAROSCOPY, DIAGNOSTIC;  Surgeon: Tea Schneider MD;  Location: [facility] OR;  Service: OB/GYN;  Laterality: N/A;    LAPAROSCOPIC SALPINGECTOMY Left 2/7/2021    Procedure: SALPINGECTOMY, LAPAROSCOPIC;  Surgeon: Tea Schneider MD;  Location: [facility] OR;  Service: OB/GYN;  Laterality: Left;     Family History   Problem Relation Age of Onset    Hypertension Mother     Alcohol abuse Father         history of s/p liver " transplant    Breast cancer Neg Hx     Colon cancer Neg Hx     Ovarian cancer Neg Hx      Social History     Tobacco Use    Smoking status: Never     Passive exposure: Never    Smokeless tobacco: Never   Substance Use Topics    Alcohol use: Yes     Comment: ocassionally    Drug use: Never     Review of Systems   Constitutional: Negative.    HENT: Negative.     Eyes: Negative.    Respiratory:  Positive for chest tightness.    Cardiovascular:  Positive for palpitations.   Gastrointestinal: Negative.    Genitourinary: Negative.    Musculoskeletal: Negative.    Skin: Negative.    Neurological:  Positive for syncope.   Psychiatric/Behavioral:  The patient is nervous/anxious.        Physical Exam     Initial Vitals [09/03/23 2111]   BP Pulse Resp Temp SpO2   (!) 158/107 102 18 98.4 °F (36.9 °C) 100 %      MAP       --         Physical Exam    Nursing note and vitals reviewed.  Constitutional: Vital signs are normal. She appears well-developed and well-nourished. She is cooperative.   HENT:   Head: Normocephalic and atraumatic.   Eyes: Conjunctivae, EOM and lids are normal. Pupils are equal, round, and reactive to light.   Neck: Trachea normal and phonation normal. Neck supple. No JVD present.   Normal range of motion.   Full passive range of motion without pain.     Cardiovascular:  Normal rate, regular rhythm, S1 normal, S2 normal, normal heart sounds, intact distal pulses and normal pulses.           Pulmonary/Chest: Effort normal and breath sounds normal.   Abdominal: Abdomen is soft and flat. Bowel sounds are normal.   Musculoskeletal:         General: Normal range of motion.      Cervical back: Full passive range of motion without pain, normal range of motion and neck supple.     Neurological: She is alert and oriented to person, place, and time. She has normal strength.   Skin: Skin is warm, dry and intact. Capillary refill takes less than 2 seconds.         ED Course   Procedures  Labs Reviewed   COMPREHENSIVE  METABOLIC PANEL - Abnormal; Notable for the following components:       Result Value    CO2 21 (*)     All other components within normal limits   CBC W/ AUTO DIFFERENTIAL - Abnormal; Notable for the following components:    Hematocrit 36.6 (*)     MCH 31.8 (*)     All other components within normal limits   URINALYSIS, REFLEX TO URINE CULTURE - Abnormal; Notable for the following components:    Specific Gravity, UA <=1.005 (*)     All other components within normal limits    Narrative:     Specimen Source->Urine   TROPONIN I   DRUG SCREEN PANEL, URINE EMERGENCY    Narrative:     Specimen Source->Urine   PREGNANCY TEST, URINE RAPID    Narrative:     Specimen Source->Urine     EKG Readings: (Independently Interpreted)   No STEMI  Normal sinus rhythm  No ectopy  Normal conduction  Normal ST segments  Normal T-wave  Normal axis  Heart rate in the 80s       Imaging Results              X-Ray Chest 1 View (In process)  Result time 09/03/23 22:57:13                     Medications   aspirin tablet 325 mg (325 mg Oral Given 9/3/23 2119)     Medical Decision Making  Patient had a near syncopal episode at the airport after using bathroom  Patient has history of anxiety & stress issues, recent ulcerative colitis DX  Differential includes syncope, near-syncope, arrhythmia, vasovagal episode    Problems Addressed:  Palpitations: complicated acute illness or injury    Amount and/or Complexity of Data Reviewed  External Data Reviewed: notes.  Labs: ordered. Decision-making details documented in ED Course.  Radiology: ordered and independent interpretation performed.  ECG/medicine tests: ordered and independent interpretation performed.    ED Management & Risk of Complications, Morbidity, Mortality:  Normal EKG with normal lab work & chest x-ray in the emergency room today  Feeling 100% better with no obvious signs of arrhythmia or complication  Ambulatory referral to Cardiology for further evaluation for near-syncope  Anxiety  versus near-syncope versus other, needs outpatient workup on DC  Carefully counseled return, voiced understanding with no symptoms on DC                           Clinical Impression:   Final diagnoses:  [R00.2] Palpitations        ED Disposition Condition    Discharge Stable          ED Prescriptions    None       Follow-up Information       Follow up With Specialties Details Why Contact Info    Tyson Reyes MD Cardiology, Interventional Cardiology Go in 2 days  [address] [facility] [address]  [phone]               Ney Prince MD  09/03/23 3543

## 2023-09-05 ENCOUNTER — TELEPHONE (OUTPATIENT)
Dept: GASTROENTEROLOGY | Facility: CLINIC | Age: 33
End: 2023-09-05
Payer: COMMERCIAL

## 2023-09-05 ENCOUNTER — OFFICE VISIT (OUTPATIENT)
Dept: OBSTETRICS AND GYNECOLOGY | Facility: CLINIC | Age: 33
End: 2023-09-05
Payer: COMMERCIAL

## 2023-09-05 ENCOUNTER — PATIENT MESSAGE (OUTPATIENT)
Dept: INTERNAL MEDICINE | Facility: CLINIC | Age: 33
End: 2023-09-05
Payer: COMMERCIAL

## 2023-09-05 VITALS
WEIGHT: 173 LBS | BODY MASS INDEX: 32.66 KG/M2 | HEART RATE: 81 BPM | DIASTOLIC BLOOD PRESSURE: 76 MMHG | SYSTOLIC BLOOD PRESSURE: 114 MMHG | HEIGHT: 61 IN

## 2023-09-05 DIAGNOSIS — Z31.41 FERTILITY TESTING: ICD-10-CM

## 2023-09-05 DIAGNOSIS — Z01.812 PRE-PROCEDURE LAB EXAM: ICD-10-CM

## 2023-09-05 DIAGNOSIS — K51.919 ULCERATIVE COLITIS WITH COMPLICATION, UNSPECIFIED LOCATION: Primary | ICD-10-CM

## 2023-09-05 DIAGNOSIS — Z12.4 CERVICAL CANCER SCREENING: ICD-10-CM

## 2023-09-05 DIAGNOSIS — Z87.59 HISTORY OF ECTOPIC PREGNANCY: ICD-10-CM

## 2023-09-05 DIAGNOSIS — Z01.419 WELL WOMAN EXAM WITH ROUTINE GYNECOLOGICAL EXAM: Primary | ICD-10-CM

## 2023-09-05 LAB
FINAL PATHOLOGIC DIAGNOSIS: NORMAL
GROSS: NORMAL
Lab: NORMAL

## 2023-09-05 PROCEDURE — 99395 PR PREVENTIVE VISIT,EST,18-39: ICD-10-PCS | Mod: S$GLB,,, | Performed by: OBSTETRICS & GYNECOLOGY

## 2023-09-05 PROCEDURE — 3008F PR BODY MASS INDEX (BMI) DOCUMENTED: ICD-10-PCS | Mod: CPTII,S$GLB,, | Performed by: OBSTETRICS & GYNECOLOGY

## 2023-09-05 PROCEDURE — 1159F PR MEDICATION LIST DOCUMENTED IN MEDICAL RECORD: ICD-10-PCS | Mod: CPTII,S$GLB,, | Performed by: OBSTETRICS & GYNECOLOGY

## 2023-09-05 PROCEDURE — 3078F PR MOST RECENT DIASTOLIC BLOOD PRESSURE < 80 MM HG: ICD-10-PCS | Mod: CPTII,S$GLB,, | Performed by: OBSTETRICS & GYNECOLOGY

## 2023-09-05 PROCEDURE — 88175 CYTOPATH C/V AUTO FLUID REDO: CPT | Performed by: OBSTETRICS & GYNECOLOGY

## 2023-09-05 PROCEDURE — 99999 PR PBB SHADOW E&M-EST. PATIENT-LVL III: CPT | Mod: PBBFAC,,, | Performed by: OBSTETRICS & GYNECOLOGY

## 2023-09-05 PROCEDURE — 1160F RVW MEDS BY RX/DR IN RCRD: CPT | Mod: CPTII,S$GLB,, | Performed by: OBSTETRICS & GYNECOLOGY

## 2023-09-05 PROCEDURE — 87624 HPV HI-RISK TYP POOLED RSLT: CPT | Performed by: OBSTETRICS & GYNECOLOGY

## 2023-09-05 PROCEDURE — 1160F PR REVIEW ALL MEDS BY PRESCRIBER/CLIN PHARMACIST DOCUMENTED: ICD-10-PCS | Mod: CPTII,S$GLB,, | Performed by: OBSTETRICS & GYNECOLOGY

## 2023-09-05 PROCEDURE — 99395 PREV VISIT EST AGE 18-39: CPT | Mod: S$GLB,,, | Performed by: OBSTETRICS & GYNECOLOGY

## 2023-09-05 PROCEDURE — 3008F BODY MASS INDEX DOCD: CPT | Mod: CPTII,S$GLB,, | Performed by: OBSTETRICS & GYNECOLOGY

## 2023-09-05 PROCEDURE — 3074F PR MOST RECENT SYSTOLIC BLOOD PRESSURE < 130 MM HG: ICD-10-PCS | Mod: CPTII,S$GLB,, | Performed by: OBSTETRICS & GYNECOLOGY

## 2023-09-05 PROCEDURE — 1159F MED LIST DOCD IN RCRD: CPT | Mod: CPTII,S$GLB,, | Performed by: OBSTETRICS & GYNECOLOGY

## 2023-09-05 PROCEDURE — 99999 PR PBB SHADOW E&M-EST. PATIENT-LVL III: ICD-10-PCS | Mod: PBBFAC,,, | Performed by: OBSTETRICS & GYNECOLOGY

## 2023-09-05 PROCEDURE — 3074F SYST BP LT 130 MM HG: CPT | Mod: CPTII,S$GLB,, | Performed by: OBSTETRICS & GYNECOLOGY

## 2023-09-05 PROCEDURE — 3078F DIAST BP <80 MM HG: CPT | Mod: CPTII,S$GLB,, | Performed by: OBSTETRICS & GYNECOLOGY

## 2023-09-05 NOTE — PROGRESS NOTES
Subjective:    Patient ID: Keisha Mena is a 32 y.o. y.o. female.     Chief Complaint: Annual Well Woman Exam     History of Present Illness:  Keisha presents today for Annual Well Woman exam. She describes her menses as regular every month without intermenstrual spotting.She admits to pelvic pain with intercourse.   She denies breast tenderness, masses, nipple discharge. She denies GYN complaints. She denies difficulty with urination. She has been having irregular bowel movements and had a colonoscopy showing colitis.  She denies bloating, early satiety, or weight changes. She is sexually active. Contraception is by no method. She has been TTC since December. She does have a h/o ectopic pregnancy with salpingectomy in .      Menstrual History:   Patient's last menstrual period was 2023 (exact date)..     OB History    : 2  Para: 1  Term: 1  : 0  : 1  Livin  Spontaneous : 0  Induced : 0  Ectopic: 1  Multiple: 0  Live Births: 1            The following portions of the patient's history were reviewed and updated as appropriate: allergies, current medications, past family history, past medical history, past social history, past surgical history, and problem list.    ROS:   CONSTITUTIONAL: Negative for fever, chills, diaphoresis, weakness, fatigue, weight loss, weight gain  ENT: negative for sore throat, nasal congestion, nasal discharge, epistaxis, tinnitus, hearing loss  EYES: negative for blurry vision, decreased vision, loss of vision, eye pain, diplopia, photophobia, discharge  SKIN: Negative for rash, itching, hives  RESPIRATORY: negative for cough, hemoptysis, shortness of breath, pleuritic chest pain, wheezing  CARDIOVASCULAR: palpitations, Denies: chest pain, dyspnea on exertion, orthopnea, paroxysmal nocturnal dyspnea, edema  BREAST: negative for breast  tenderness, breast mass, nipple discharge, or skin changes  GASTROINTESTINAL: abdominal pain, diarrhea,  constipation, blood in stool  GENITOURINARY: pelvic pain, negative for abnormal vaginal bleeding, amenorrhea, decreased libido, dysuria, genital sores, hematuria, incontinence, menorrhagia, urinary frequency, vaginal discharge  HEMATOLOGIC/LYMPHATIC: negative for swollen lymph nodes, bleeding, bruising  MUSCULOSKELETAL: negative for back pain, joint pain, joint stiffness, joint swelling, muscle pain, muscle weakness  NEUROLOGICAL: negative for dizzy/vertigo, headache, focal weakness, numbness/tingling, speech problems, loss of consciousness, confusion, memory loss  BEHAVORIAL/PSYCH: negative for anxiety, depression, psychosis  ENDOCRINE: negative for polydipsia/polyuria, palpitations, skin changes, temperature intolerance, unexpected weight changes  ALLERGIC/IMMUNOLOGIC: negative for urticaria, hay fever, angioedema      Objective:    Vital Signs:  Vitals:    09/05/23 1552   BP: 114/76   Pulse: 81       Physical Exam:  General:  alert, cooperative, no distress   Skin:  Skin color, texture, turgor normal. No rashes or lesions   HEENT:  extra ocular movement intact, sclera clear, anicteric   Neck: supple, trachea midline, no adenopathy or thyromegally   Respiratory:  Normal effort   Breasts:  no discharge, erythema, tenderness, or palpable masses; no axillary lymphadenopathy   Abdomen:  soft, nontender, no palpable masses   Pelvis: External genitalia: normal general appearance  Urinary system: urethral meatus normal, bladder nontender  Vaginal: normal mucosa without prolapse or lesions  Cervix: normal appearance  Uterus: retroverted, normal size, shape, position  Adnexa: normal size, nontender bilaterally   Extremities: Normal ROM; no edema, no cyanosis   Neurologial: Normal strength and tone. No focal numbness or weakness.   Psychiatric: normal mood, speech, dress, and thought processes         Assessment:       Healthy female exam.     1. Well woman exam with routine gynecological exam    2. Cervical cancer  screening    3. Fertility testing    4. History of ectopic pregnancy          Plan:      Well woman exam with routine gynecological exam    Cervical cancer screening  -     Liquid-Based Pap Smear, Screening  -     HPV High Risk Genotypes, PCR    Fertility testing    History of ectopic pregnancy      Discussed plan for HSG to evaluate right tube.     COUNSELING:  Keisha was counseled on A.C.O.G. Pap guidelines and recommendations for yearly pelvic exams in addition to recommendations for monthly self breast exams; to see her PCP for other health maintenance.

## 2023-09-05 NOTE — TELEPHONE ENCOUNTER
Please advise on request for GI referral. She was initially referred to Dr. Pagan, but was cancelled because Dr. Pagan does not treat the conditions for which she was referred. It was recommended that she see Dr. Gerardo, Dr. Beavers or Dr. West. Please advise. Thanks.

## 2023-09-05 NOTE — TELEPHONE ENCOUNTER
Called pt and left a VM. Informed pt Dr Pagan does not see patient's with UC or chron's or IBD. Informed pt she can see Steven Beavers or Brett in [place]. Also left pt patient portal message.

## 2023-09-06 ENCOUNTER — OFFICE VISIT (OUTPATIENT)
Dept: CARDIOLOGY | Facility: CLINIC | Age: 33
End: 2023-09-06
Payer: COMMERCIAL

## 2023-09-06 VITALS
DIASTOLIC BLOOD PRESSURE: 78 MMHG | WEIGHT: 173.75 LBS | HEART RATE: 79 BPM | BODY MASS INDEX: 32.8 KG/M2 | RESPIRATION RATE: 18 BRPM | OXYGEN SATURATION: 100 % | HEIGHT: 61 IN | SYSTOLIC BLOOD PRESSURE: 118 MMHG

## 2023-09-06 DIAGNOSIS — G47.30 SLEEP APNEA, UNSPECIFIED TYPE: ICD-10-CM

## 2023-09-06 DIAGNOSIS — E66.09 CLASS 1 OBESITY DUE TO EXCESS CALORIES WITH SERIOUS COMORBIDITY AND BODY MASS INDEX (BMI) OF 32.0 TO 32.9 IN ADULT: ICD-10-CM

## 2023-09-06 DIAGNOSIS — K51.90 ULCERATIVE COLITIS WITHOUT COMPLICATIONS, UNSPECIFIED LOCATION: ICD-10-CM

## 2023-09-06 DIAGNOSIS — I10 PRIMARY HYPERTENSION: ICD-10-CM

## 2023-09-06 DIAGNOSIS — R00.2 PALPITATIONS: ICD-10-CM

## 2023-09-06 DIAGNOSIS — R07.89 CHEST TIGHTNESS: ICD-10-CM

## 2023-09-06 DIAGNOSIS — R55 NEAR SYNCOPE: ICD-10-CM

## 2023-09-06 PROCEDURE — 3078F PR MOST RECENT DIASTOLIC BLOOD PRESSURE < 80 MM HG: ICD-10-PCS | Mod: CPTII,S$GLB,, | Performed by: INTERNAL MEDICINE

## 2023-09-06 PROCEDURE — 99204 PR OFFICE/OUTPT VISIT, NEW, LEVL IV, 45-59 MIN: ICD-10-PCS | Mod: S$GLB,,, | Performed by: INTERNAL MEDICINE

## 2023-09-06 PROCEDURE — 1160F PR REVIEW ALL MEDS BY PRESCRIBER/CLIN PHARMACIST DOCUMENTED: ICD-10-PCS | Mod: CPTII,S$GLB,, | Performed by: INTERNAL MEDICINE

## 2023-09-06 PROCEDURE — 3074F PR MOST RECENT SYSTOLIC BLOOD PRESSURE < 130 MM HG: ICD-10-PCS | Mod: CPTII,S$GLB,, | Performed by: INTERNAL MEDICINE

## 2023-09-06 PROCEDURE — 1159F MED LIST DOCD IN RCRD: CPT | Mod: CPTII,S$GLB,, | Performed by: INTERNAL MEDICINE

## 2023-09-06 PROCEDURE — 3078F DIAST BP <80 MM HG: CPT | Mod: CPTII,S$GLB,, | Performed by: INTERNAL MEDICINE

## 2023-09-06 PROCEDURE — 99999 PR PBB SHADOW E&M-EST. PATIENT-LVL V: CPT | Mod: PBBFAC,,, | Performed by: INTERNAL MEDICINE

## 2023-09-06 PROCEDURE — 99999 PR PBB SHADOW E&M-EST. PATIENT-LVL V: ICD-10-PCS | Mod: PBBFAC,,, | Performed by: INTERNAL MEDICINE

## 2023-09-06 PROCEDURE — 3008F BODY MASS INDEX DOCD: CPT | Mod: CPTII,S$GLB,, | Performed by: INTERNAL MEDICINE

## 2023-09-06 PROCEDURE — 1160F RVW MEDS BY RX/DR IN RCRD: CPT | Mod: CPTII,S$GLB,, | Performed by: INTERNAL MEDICINE

## 2023-09-06 PROCEDURE — 99204 OFFICE O/P NEW MOD 45 MIN: CPT | Mod: S$GLB,,, | Performed by: INTERNAL MEDICINE

## 2023-09-06 PROCEDURE — 1159F PR MEDICATION LIST DOCUMENTED IN MEDICAL RECORD: ICD-10-PCS | Mod: CPTII,S$GLB,, | Performed by: INTERNAL MEDICINE

## 2023-09-06 PROCEDURE — 3074F SYST BP LT 130 MM HG: CPT | Mod: CPTII,S$GLB,, | Performed by: INTERNAL MEDICINE

## 2023-09-06 PROCEDURE — 3008F PR BODY MASS INDEX (BMI) DOCUMENTED: ICD-10-PCS | Mod: CPTII,S$GLB,, | Performed by: INTERNAL MEDICINE

## 2023-09-06 NOTE — PROGRESS NOTES
"                                                                            [facility] Cardiology     Subjective:    Patient ID:  Keisha Mnea is a 32 y.o. female who presents for evaluation of Palpitations, near syncope, Hypertension, and Chest Pain    Review of patient's allergies indicates:  No Known Allergies   She was seen in the emergency department September 3rd for an episode of near-syncope.  Her vitals in the ED was blood pressure 160/110 mm Hg, sinus tachycardia, heart rate 102 BPM.  [place] the airport to  a [place] member.  She had near syncope in the bathroom with urination.  After she got out of the bathroom she could not function normally and had to go sit down.  To depart the airport and went to the emergency room from there.    She was on 2 antibiotics for acute gastroenteritis when she went to the airport.  She reported rectal bleeding and diarrhea.  She states that she developed chest pain followed by near syncope.  She had palpitations.  She knows when her blood pressure is elevated and it felt like it was high.  Her evaluation in the ED was negative and she was released.  She takes labetalol 100 mg b.i.d. for hypertension.    She started labetalol when she was pregnant.  Normally her blood pressures are well controlled.  She has chronic palpitations intermittently.  Her description of palpitations are isolated skips.  She is on sleep apnea treatment with CPAP.  She had had nocturnal gasping which responded to treatment.  She has underlying ulcerative colitis.  She is scheduled see her gastroenterologist in the next couple of weeks.  She was on sulfasalazine short term for her diarrhea.    The patient's electrocardiogram September 3, 2023 in the ED was normal.        ROS     Objective:       Vitals:    09/06/23 1359   BP: 118/78   Pulse: 79   Resp: 18   SpO2: 100%   Weight: 78.8 kg (173 lb 11.6 oz)   Height: 5' 1" (1.549 m)    Physical Exam      Assessment:       1. Palpitations    2. Class 1 " obesity due to excess calories with serious comorbidity and body mass index (BMI) of 32.0 to 32.9 in adult    3. Ulcerative colitis without complications, unspecified location    4. Primary hypertension    5. Chest tightness    6. Near syncope    7. Sleep apnea, unspecified type      Results for orders placed or performed in visit on 09/05/23   Pap Smear, Thin Prep   Result Value Ref Range    Cytology ThinPrep Pap Source Cervix     Cytology Thinprep PAP Clinical History Routine     Cytology ThinPrep Pap LMP 08/09/2023     Cytology ThinPrep Previous PAP Unknown     Cytology ThinPrep Previous Biopsy No          Current Outpatient Medications:     hydrOXYzine HCL (ATARAX) 25 MG tablet, Take 25 mg by mouth as needed for Anxiety., Disp: , Rfl:     labetaloL (NORMODYNE) 100 MG tablet, TAKE 1 TABLET BY MOUTH TWICE A DAY, Disp: 180 tablet, Rfl: 3    Lactobacillus rhamnosus GG (CULTURELLE) 10 billion cell capsule, Take 1 capsule by mouth once daily., Disp: , Rfl:     PNV Comb12/Iron Cb/FA1/DSS/DHA (PRENATAL 12-IRON-FA1-DSS-DHA ORAL), Take by mouth., Disp: , Rfl:     loperamide (IMODIUM A-D) 2 mg Tab, Take 1 tablet (2 mg total) by mouth 4 (four) times daily as needed. (Patient not taking: Reported on 9/5/2023), Disp: 15 tablet, Rfl: 0    pramoxine-hydrocortisone (PROCTOCREAM-HC) 1-1 % rectal cream, Place rectally 3 (three) times daily. (Patient not taking: Reported on 9/6/2023), Disp: 30 g, Rfl: 1    sulfaSALAzine (AZULFIDINE) 500 MG EC tablet, Take 1 tablet (500 mg total) by mouth 2 (two) times a day. (Patient not taking: Reported on 9/5/2023), Disp: 30 tablet, Rfl: 0     Lab Results   Component Value Date    WBC 6.71 09/03/2023    RBC 4.03 09/03/2023    HGB 12.8 09/03/2023    HCT 36.6 (L) 09/03/2023    MCV 91 09/03/2023    MCH 31.8 (H) 09/03/2023    MCHC 35.0 09/03/2023    RDW 12.5 09/03/2023     09/03/2023    MPV 10.3 09/03/2023    GRAN 3.4 09/03/2023    GRAN 50.3 09/03/2023    LYMPH 2.5 09/03/2023    LYMPH 37.6  09/03/2023    MONO 0.7 09/03/2023    MONO 9.7 09/03/2023    EOS 0.1 09/03/2023    BASO 0.03 09/03/2023    EOSINOPHIL 1.9 09/03/2023    BASOPHIL 0.4 09/03/2023    MG 1.9 03/23/2023        CMP  Lab Results   Component Value Date     09/03/2023    K 3.8 09/03/2023     09/03/2023    CO2 21 (L) 09/03/2023    GLU 98 09/03/2023    BUN 18 09/03/2023    CREATININE 1.1 09/03/2023    CALCIUM 9.4 09/03/2023    PROT 7.6 09/03/2023    ALBUMIN 4.3 09/03/2023    BILITOT 0.3 09/03/2023    ALKPHOS 55 09/03/2023    AST 17 09/03/2023    ALT 11 09/03/2023    ANIONGAP 9 09/03/2023    ESTGFRAFRICA >60 02/10/2021    EGFRNONAA >60 02/10/2021        Lab Results   Component Value Date    LABBLOO No growth after 5 days. 08/27/2023            Results for orders placed or performed during the hospital encounter of 09/03/23   EKG 12-lead    Collection Time: 09/03/23  9:18 PM    Narrative    Test Reason : R00.2    Vent. Rate : 080 BPM     Atrial Rate : 080 BPM     P-R Int : 170 ms          QRS Dur : 080 ms      QT Int : 352 ms       P-R-T Axes : 071 076 064 degrees     QTc Int : 405 ms    Normal sinus rhythm  Normal ECG  When compared with ECG of 27-AUG-2023 17:23,  No significant change was found  Confirmed by Tyson Reyes MD ([id number]) on 9/5/2023 8:31:10 AM    Referred By: [email or web address]   SELF           Confirmed By:Tyson Reyes MD        X-Ray Chest 1 View 09/03/2023 [id number] Final   CT Abdomen Pelvis With Contrast 08/27/2023 [id number] Final   CT Abdomen Pelvis With Contrast 02/10/2021 [id number] Final            Plan:       Problem List Items Addressed This Visit          Cardiac/Vascular    Primary hypertension     Labetalol will be continued.  It was started during her pregnancy.  She had recent exacerbation of blood pressure readings.  We discussed labetalol and requirements for 3-4 times per day for optimal blood pressure control.  She prefers to stay on labetalol 100 mg b.i.d. at this time.         Palpitations     Labetalol to  continue.  Holter monitor discussed.  I did not order one, her palpitations sound benign.  We discussed increased beta-blockers as best management option.  Echocardiogram ordered.         Relevant Orders    Echo    Chest tightness     Isolated event on day of near-syncope.  No recurrence.  It may have been related to tachycardia.  Reassurance provided.            Endocrine    Class 1 obesity due to excess calories with serious comorbidity and body mass index (BMI) of 32.0 to 32.9 in adult     Weight loss encouraged.            GI    Ulcerative colitis     She had recent rectal bleeding.  She follows with gastroenterology.            Other    Near syncope     Likely a vasovagal event.  Reassurance provided.  She has had no recurrence of similar symptoms and her gastroenteritis symptomatology has improved.         Sleep apnea     She is compliant with therapy.                 Echocardiogram ordered given her chronic palpitations.  I told her I thought she had a vasovagal event.  Reassurance provided regarding the chest tightness complaints.  I do not advise a Holter since she only has palpitations intermittently.  I did tell her that she could increase her labetalol dosing for palpitations if she is having a difficult day.  She is on very low-dose labetalol.    No follow-up arranged at this time pending echo results.  Provided her study shows normal ejection fraction, follow-up with me could be as needed.    Thank you for allowing me to participate in your patient's care.             Tyson Reyes MD  09/07/2023   2:27 PM

## 2023-09-07 ENCOUNTER — TELEPHONE (OUTPATIENT)
Dept: GASTROENTEROLOGY | Facility: CLINIC | Age: 33
End: 2023-09-07
Payer: COMMERCIAL

## 2023-09-07 ENCOUNTER — TELEPHONE (OUTPATIENT)
Dept: OBSTETRICS AND GYNECOLOGY | Facility: CLINIC | Age: 33
End: 2023-09-07
Payer: COMMERCIAL

## 2023-09-07 PROBLEM — R00.2 PALPITATIONS: Status: ACTIVE | Noted: 2023-09-07

## 2023-09-07 PROBLEM — R07.89 CHEST TIGHTNESS: Status: ACTIVE | Noted: 2023-09-07

## 2023-09-07 PROBLEM — K51.90 ULCERATIVE COLITIS: Status: ACTIVE | Noted: 2023-09-07

## 2023-09-07 PROBLEM — G47.30 SLEEP APNEA: Status: ACTIVE | Noted: 2023-09-07

## 2023-09-07 PROBLEM — R55 NEAR SYNCOPE: Status: ACTIVE | Noted: 2023-09-07

## 2023-09-07 PROBLEM — I10 PRIMARY HYPERTENSION: Status: ACTIVE | Noted: 2023-09-07

## 2023-09-07 NOTE — TELEPHONE ENCOUNTER
Called & spoke to pt  - NP IBD appt scheduled  - 9/18 GI appt cancelled per pt request  - All questions answered

## 2023-09-07 NOTE — ASSESSMENT & PLAN NOTE
Isolated event on day of near-syncope.  No recurrence.  It may have been related to tachycardia.  Reassurance provided.

## 2023-09-07 NOTE — ASSESSMENT & PLAN NOTE
Likely a vasovagal event.  Reassurance provided.  She has had no recurrence of similar symptoms and her gastroenteritis symptomatology has improved.

## 2023-09-07 NOTE — TELEPHONE ENCOUNTER
Menses began on 9/7/2023.  Patient scheduled for HSG with stat urine pregnancy test on 9/13/2023.  Instructed to arrive at [facility] main entry at 8a.m. registering at the information desk and to be NPO two hours prior to testing.  May take two Ibuprofen or Tylenol one hour prior to procedure if patient is not allergic to these medications.  Patient verbalized understanding with no further questions asked.

## 2023-09-07 NOTE — ASSESSMENT & PLAN NOTE
Labetalol will be continued.  It was started during her pregnancy.  She had recent exacerbation of blood pressure readings.  We discussed labetalol and requirements for 3-4 times per day for optimal blood pressure control.  She prefers to stay on labetalol 100 mg b.i.d. at this time.

## 2023-09-07 NOTE — ASSESSMENT & PLAN NOTE
Labetalol to continue.  Holter monitor discussed.  I did not order one, her palpitations sound benign.  We discussed increased beta-blockers as best management option.  Echocardiogram ordered.

## 2023-09-11 ENCOUNTER — HOSPITAL ENCOUNTER (OUTPATIENT)
Dept: PULMONOLOGY | Facility: HOSPITAL | Age: 33
Discharge: HOME | End: 2023-09-11
Attending: INTERNAL MEDICINE
Payer: COMMERCIAL

## 2023-09-11 ENCOUNTER — PATIENT MESSAGE (OUTPATIENT)
Dept: CARDIOLOGY | Facility: CLINIC | Age: 33
End: 2023-09-11
Payer: COMMERCIAL

## 2023-09-11 VITALS — HEIGHT: 61 IN | BODY MASS INDEX: 32.66 KG/M2 | WEIGHT: 173 LBS

## 2023-09-11 DIAGNOSIS — R00.2 PALPITATIONS: ICD-10-CM

## 2023-09-11 LAB
ASCENDING AORTA: 2.41 CM
AV INDEX (PROSTH): 0.76
AV MEAN GRADIENT: 3 MMHG
AV PEAK GRADIENT: 5 MMHG
AV VALVE AREA BY VELOCITY RATIO: 2.3 CM²
AV VALVE AREA: 2.33 CM²
AV VELOCITY RATIO: 0.75
BSA FOR ECHO PROCEDURE: 1.84 M2
CV ECHO LV RWT: 0.44 CM
DOP CALC AO PEAK VEL: 1.11 M/S
DOP CALC AO VTI: 26.2 CM
DOP CALC LVOT AREA: 3.1 CM2
DOP CALC LVOT DIAMETER: 1.98 CM
DOP CALC LVOT PEAK VEL: 0.83 M/S
DOP CALC LVOT STROKE VOLUME: 60.93 CM3
DOP CALCLVOT PEAK VEL VTI: 19.8 CM
E WAVE DECELERATION TIME: 166.08 MSEC
E/A RATIO: 1.36
E/E' RATIO: 4.75 M/S
ECHO LV POSTERIOR WALL: 0.83 CM (ref 0.6–1.1)
FRACTIONAL SHORTENING: 38 % (ref 28–44)
INTERVENTRICULAR SEPTUM: 0.79 CM (ref 0.6–1.1)
IVC DIAMETER: 1.48 CM
IVRT: 114.18 MSEC
LA MAJOR: 4.86 CM
LEFT ATRIUM SIZE: 2.8 CM
LEFT ATRIUM VOLUME INDEX MOD: 10.8 ML/M2
LEFT ATRIUM VOLUME MOD: 19.29 CM3
LEFT INTERNAL DIMENSION IN SYSTOLE: 2.38 CM (ref 2.1–4)
LEFT VENTRICLE DIASTOLIC VOLUME INDEX: 35.03 ML/M2
LEFT VENTRICLE DIASTOLIC VOLUME: 62.35 ML
LEFT VENTRICLE MASS INDEX: 49 G/M2
LEFT VENTRICLE SYSTOLIC VOLUME INDEX: 11.1 ML/M2
LEFT VENTRICLE SYSTOLIC VOLUME: 19.7 ML
LEFT VENTRICULAR INTERNAL DIMENSION IN DIASTOLE: 3.81 CM (ref 3.5–6)
LEFT VENTRICULAR MASS: 87.79 G
LV LATERAL E/E' RATIO: 4.07 M/S
LV SEPTAL E/E' RATIO: 5.7 M/S
LVOT MG: 1.43 MMHG
LVOT MV: 0.56 CM/S
MV PEAK A VEL: 0.42 M/S
MV PEAK E VEL: 0.57 M/S
MV STENOSIS PRESSURE HALF TIME: 35.73 MS
MV VALVE AREA P 1/2 METHOD: 6.16 CM2
PISA TR MAX VEL: 1.29 M/S
PULM VEIN S/D RATIO: 1.28
PV MV: 0.6 M/S
PV PEAK D VEL: 0.29 M/S
PV PEAK GRADIENT: 3 MMHG
PV PEAK S VEL: 0.37 M/S
PV PEAK VELOCITY: 0.92 M/S
RA MAJOR: 4.13 CM
RA PRESSURE ESTIMATED: 3 MMHG
RIGHT VENTRICULAR END-DIASTOLIC DIMENSION: 2.22 CM
RV TB RVSP: 4 MMHG
RV TISSUE DOPPLER FREE WALL SYSTOLIC VELOCITY 1 (APICAL 4 CHAMBER VIEW): 12.24 CM/S
SINUS: 2.06 CM
STJ: 2.27 CM
TDI LATERAL: 0.14 M/S
TDI SEPTAL: 0.1 M/S
TDI: 0.12 M/S
TR MAX PG: 7 MMHG
TRICUSPID ANNULAR PLANE SYSTOLIC EXCURSION: 2.6 CM
TV REST PULMONARY ARTERY PRESSURE: 10 MMHG
Z-SCORE OF LEFT VENTRICULAR DIMENSION IN END DIASTOLE: -2.56
Z-SCORE OF LEFT VENTRICULAR DIMENSION IN END SYSTOLE: -1.94

## 2023-09-11 PROCEDURE — 93306 ECHO (CUPID ONLY): ICD-10-PCS | Mod: 26,,, | Performed by: INTERNAL MEDICINE

## 2023-09-11 PROCEDURE — 93306 TTE W/DOPPLER COMPLETE: CPT | Mod: 26,,, | Performed by: INTERNAL MEDICINE

## 2023-09-11 PROCEDURE — 93306 TTE W/DOPPLER COMPLETE: CPT

## 2023-09-13 ENCOUNTER — HOSPITAL ENCOUNTER (OUTPATIENT)
Dept: RADIOLOGY | Facility: HOSPITAL | Age: 33
Discharge: HOME | End: 2023-09-13
Attending: OBSTETRICS & GYNECOLOGY
Payer: COMMERCIAL

## 2023-09-13 DIAGNOSIS — Z31.41 FERTILITY TESTING: ICD-10-CM

## 2023-09-13 PROCEDURE — 58340 CATHETER FOR HYSTEROGRAPHY: CPT

## 2023-09-13 PROCEDURE — 25500020 PHARM REV CODE 255: Performed by: OBSTETRICS & GYNECOLOGY

## 2023-09-13 PROCEDURE — 58340 CATHETER FOR HYSTEROGRAPHY: CPT | Mod: ,,, | Performed by: OBSTETRICS & GYNECOLOGY

## 2023-09-13 PROCEDURE — 58340 CATHETER FOR HYSTEROGRAPHY: CPT | Mod: ,,, | Performed by: RADIOLOGY

## 2023-09-13 PROCEDURE — 58340 PR CATH/INJECT HYSTEROSALPINGOGRAM: ICD-10-PCS | Mod: ,,, | Performed by: OBSTETRICS & GYNECOLOGY

## 2023-09-13 PROCEDURE — 58340 FL HYSTEROSALPINGOGRAM WITH RADIOLOGIST: ICD-10-PCS | Mod: ,,, | Performed by: RADIOLOGY

## 2023-09-13 PROCEDURE — 74740 FL HYSTEROSALPINGOGRAM WITH RADIOLOGIST: ICD-10-PCS | Mod: 26,,, | Performed by: RADIOLOGY

## 2023-09-13 PROCEDURE — 74740 X-RAY FEMALE GENITAL TRACT: CPT | Mod: 26,,, | Performed by: RADIOLOGY

## 2023-09-13 RX ADMIN — IOHEXOL 30 ML: 300 INJECTION, SOLUTION INTRAVENOUS at 10:09

## 2023-09-13 NOTE — PROGRESS NOTES
HSG Procedure Note    Keisha Mena is a 32 y.o. female  presents for an HSG secondary to infertility, h/o ectopic pregnancy with left salpingectomy.      Patient placed in dorsal lithotomy on radiology table. Sterile speculum was placed in the vagina. Cervix was prepped with Betadine. Catheter was inserted through the external cervical os to the level of the internal os. The catheter balloon was insufflated with 3 cc of room air. Sterile speculum was removed from the vagina.    Approximately 20 cc of radio opaque dye was placed in the uterus. Several radiologic images were captured. Dye was not seen extravasating through the fallopian tubes.

## 2023-09-14 ENCOUNTER — PATIENT MESSAGE (OUTPATIENT)
Dept: OBSTETRICS AND GYNECOLOGY | Facility: CLINIC | Age: 33
End: 2023-09-14
Payer: COMMERCIAL

## 2023-09-14 DIAGNOSIS — N97.1 TUBAL INFERTILITY IN FEMALE: Primary | ICD-10-CM

## 2023-09-15 ENCOUNTER — LAB VISIT (OUTPATIENT)
Dept: LAB | Facility: HOSPITAL | Age: 33
End: 2023-09-15
Attending: INTERNAL MEDICINE
Payer: COMMERCIAL

## 2023-09-15 ENCOUNTER — OFFICE VISIT (OUTPATIENT)
Dept: GASTROENTEROLOGY | Facility: CLINIC | Age: 33
End: 2023-09-15
Payer: COMMERCIAL

## 2023-09-15 VITALS
HEART RATE: 67 BPM | TEMPERATURE: 98 F | WEIGHT: 170.63 LBS | BODY MASS INDEX: 32.24 KG/M2 | DIASTOLIC BLOOD PRESSURE: 88 MMHG | SYSTOLIC BLOOD PRESSURE: 131 MMHG

## 2023-09-15 DIAGNOSIS — K51.311 ULCERATIVE RECTOSIGMOIDITIS WITH RECTAL BLEEDING: ICD-10-CM

## 2023-09-15 DIAGNOSIS — R12 HEARTBURN: ICD-10-CM

## 2023-09-15 DIAGNOSIS — K51.311 ULCERATIVE RECTOSIGMOIDITIS WITH RECTAL BLEEDING: Primary | ICD-10-CM

## 2023-09-15 LAB
25(OH)D3+25(OH)D2 SERPL-MCNC: 38 NG/ML (ref 30–96)
CRP SERPL-MCNC: <0.3 MG/L (ref 0–8.2)
HAV IGG SER QL IA: NORMAL
HBV CORE AB SERPL QL IA: NORMAL
HBV SURFACE AG SERPL QL IA: NORMAL
HCV AB SERPL QL IA: NORMAL
MUMPS IGG INTERPRETATION: POSITIVE
MUMPS IGG SCREEN: 89.3 AU/ML
RUBEOLA IGG ANTIBODY: >300 AU/ML
RUBEOLA INTERPRETATION: POSITIVE
VARICELLA INTERPRETATION: POSITIVE
VARICELLA ZOSTER IGG: 1982 AU/ML

## 2023-09-15 PROCEDURE — 86704 HEP B CORE ANTIBODY TOTAL: CPT | Performed by: INTERNAL MEDICINE

## 2023-09-15 PROCEDURE — 87340 HEPATITIS B SURFACE AG IA: CPT | Performed by: INTERNAL MEDICINE

## 2023-09-15 PROCEDURE — 99204 OFFICE O/P NEW MOD 45 MIN: CPT | Mod: S$GLB,,, | Performed by: INTERNAL MEDICINE

## 2023-09-15 PROCEDURE — 86480 TB TEST CELL IMMUN MEASURE: CPT | Performed by: INTERNAL MEDICINE

## 2023-09-15 PROCEDURE — 3008F BODY MASS INDEX DOCD: CPT | Mod: CPTII,S$GLB,, | Performed by: INTERNAL MEDICINE

## 2023-09-15 PROCEDURE — 82306 VITAMIN D 25 HYDROXY: CPT | Performed by: INTERNAL MEDICINE

## 2023-09-15 PROCEDURE — 86803 HEPATITIS C AB TEST: CPT | Performed by: INTERNAL MEDICINE

## 2023-09-15 PROCEDURE — 86787 VARICELLA-ZOSTER ANTIBODY: CPT | Performed by: INTERNAL MEDICINE

## 2023-09-15 PROCEDURE — 99204 PR OFFICE/OUTPT VISIT, NEW, LEVL IV, 45-59 MIN: ICD-10-PCS | Mod: S$GLB,,, | Performed by: INTERNAL MEDICINE

## 2023-09-15 PROCEDURE — 86765 RUBEOLA ANTIBODY: CPT | Performed by: INTERNAL MEDICINE

## 2023-09-15 PROCEDURE — 1160F PR REVIEW ALL MEDS BY PRESCRIBER/CLIN PHARMACIST DOCUMENTED: ICD-10-PCS | Mod: CPTII,S$GLB,, | Performed by: INTERNAL MEDICINE

## 2023-09-15 PROCEDURE — 86762 RUBELLA ANTIBODY: CPT | Performed by: INTERNAL MEDICINE

## 2023-09-15 PROCEDURE — 3075F PR MOST RECENT SYSTOLIC BLOOD PRESS GE 130-139MM HG: ICD-10-PCS | Mod: CPTII,S$GLB,, | Performed by: INTERNAL MEDICINE

## 2023-09-15 PROCEDURE — 86706 HEP B SURFACE ANTIBODY: CPT | Performed by: INTERNAL MEDICINE

## 2023-09-15 PROCEDURE — 86140 C-REACTIVE PROTEIN: CPT | Performed by: INTERNAL MEDICINE

## 2023-09-15 PROCEDURE — 36415 COLL VENOUS BLD VENIPUNCTURE: CPT | Performed by: INTERNAL MEDICINE

## 2023-09-15 PROCEDURE — 86735 MUMPS ANTIBODY: CPT | Performed by: INTERNAL MEDICINE

## 2023-09-15 PROCEDURE — 1159F MED LIST DOCD IN RCRD: CPT | Mod: CPTII,S$GLB,, | Performed by: INTERNAL MEDICINE

## 2023-09-15 PROCEDURE — 3079F PR MOST RECENT DIASTOLIC BLOOD PRESSURE 80-89 MM HG: ICD-10-PCS | Mod: CPTII,S$GLB,, | Performed by: INTERNAL MEDICINE

## 2023-09-15 PROCEDURE — 3008F PR BODY MASS INDEX (BMI) DOCUMENTED: ICD-10-PCS | Mod: CPTII,S$GLB,, | Performed by: INTERNAL MEDICINE

## 2023-09-15 PROCEDURE — 84433 ASY THIOPURIN S-MTHYLTRNSFRS: CPT | Performed by: INTERNAL MEDICINE

## 2023-09-15 PROCEDURE — 86790 VIRUS ANTIBODY NOS: CPT | Performed by: INTERNAL MEDICINE

## 2023-09-15 PROCEDURE — 1159F PR MEDICATION LIST DOCUMENTED IN MEDICAL RECORD: ICD-10-PCS | Mod: CPTII,S$GLB,, | Performed by: INTERNAL MEDICINE

## 2023-09-15 PROCEDURE — 3079F DIAST BP 80-89 MM HG: CPT | Mod: CPTII,S$GLB,, | Performed by: INTERNAL MEDICINE

## 2023-09-15 PROCEDURE — 1160F RVW MEDS BY RX/DR IN RCRD: CPT | Mod: CPTII,S$GLB,, | Performed by: INTERNAL MEDICINE

## 2023-09-15 PROCEDURE — 3075F SYST BP GE 130 - 139MM HG: CPT | Mod: CPTII,S$GLB,, | Performed by: INTERNAL MEDICINE

## 2023-09-15 RX ORDER — ACETAMINOPHEN 500 MG
500 TABLET ORAL EVERY 6 HOURS PRN
COMMUNITY

## 2023-09-15 RX ORDER — MESALAMINE 1.2 G/1
2.4 TABLET, DELAYED RELEASE ORAL
Qty: 60 TABLET | Refills: 5 | Status: SHIPPED | OUTPATIENT
Start: 2023-09-15 | End: 2024-01-23 | Stop reason: SDUPTHER

## 2023-09-15 RX ORDER — MESALAMINE 1000 MG/1
1000 SUPPOSITORY RECTAL NIGHTLY
Qty: 30 SUPPOSITORY | Refills: 5 | Status: SHIPPED | OUTPATIENT
Start: 2023-09-15 | End: 2023-09-18

## 2023-09-15 NOTE — PATIENT INSTRUCTIONS
Get labs today  Submit stool test  Start Lialda 2 pills daily  Start Canasa 1 suppository every night  Start omeprazole 20 mg daily (30 minutes before supper)  Follow up in 4 months

## 2023-09-15 NOTE — PROGRESS NOTES
IBD PATIENT INTAKE:    COVID symptoms in the last 7 days (runny nose, sore throat, congestion, cough, fever): No  PCP: Lisa Fung  If not PCP-  number given to establish [phone]: N/A    ALLERGIES REVIEWED:  Yes    CHIEF COMPLAINT:    Chief Complaint   Patient presents with    Ulcerative Colitis       VITAL SIGNS:  /88 (BP Location: Right arm, Patient Position: Sitting)   Pulse 67   Temp 97.9 °F (36.6 °C)   Wt 77.4 kg (170 lb 10.2 oz)   LMP 08/09/2023 (Exact Date)   BMI 32.24 kg/m²      Change in medical, surgical, family or social history: No    IBD THERAPY (name, dose/frequency):  none  Last dose:  n/a    Next dose:  n/a  Infusion/Pharmacy: NA    Vitamins (be sure this has been put in medication list):   Vit D:  no     Vit B-12:  no   Folic Acid: no       Calcium: no     Iron:  no      MVI: no    Antibiotics (past 30 Days):  No  If yes   Indication:  Name of antibiotic:  Completion date:     REVIEWED MEDICATION LIST RECONCILED INCLUDING ABOVE MEDS:  Yes                    Answers submitted by the patient for this visit:  New Patient Questionnaire  (Submitted on 9/12/2023)  trouble swallowing: Yes  abdominal pain: Yes  nervous/ anxious: Yes  heartburn: Yes

## 2023-09-15 NOTE — PROGRESS NOTES
[facility]          Inflammatory Bowel Disease New Patient Consultation Note         TODAY'S VISIT DATE:  9/15/2023    Reason for Consult:    Chief Complaint   Patient presents with    Ulcerative Colitis       PCP: Lisa Fung      Referring MD:   Dr. González Fernandez    History of Present Illness:  Keisha Mena who is a 32 y.o. female is being seen today at the [facility] on 09/15/2023 for inflammatory bowel disease- ulcerative colitis.  This is my 1st appointment with her.  She was recently diagnosed with ulcerative colitis.  Her history is noted below.  Currently she feels like things are actually a little better than when she had her scope done a few weeks ago.  She continues to have some tenesmus and urgency.  She has pain with passing bowel movements.  She has fairly minimal diarrhea but does have occasional blood in the stools.  She tried taking sulfasalazine but only took this for a few days because it actually made her feel poorly.  She was having heart palpitations, headaches, and ear pain.  Her other complaint is that recently she is been having a lot more heartburn and regurgitation symptoms.  She denies any family history of IBD.  She denies any tobacco use.  She denies any history of joint issues, eye issues, or rashes.    IBD History:  In early August she began having rectal pain with bowel movements as well as tenesmus and urgency.  She saw primary care doctor it was initially concern for possible perianal abscess but none was ever identified.  She was treated with antibiotics and took this for about 7 days.  About 2 weeks after her initial presentation she developed a fever and went to the emergency room.  A CT scan at that time showed mild wall thickening of the rectum concerning for possible proctitis.  She was subsequently seen by Colorectal surgery and a colonoscopy was scheduled.  Her colonoscopy occurred on August 29,  "2023.  At that time she was found to have inflammatory changes of the rectum, sigmoid colon, and descending colon.  The remainder of the colon was normal.  Biopsy showed active inflammation but no definitive chronic changes.  She was given sulfasalazine 500 mg twice daily and reports that while taking this she continued to feel bad and actually developed palpitations, headaches, and ear pain.  She stopped taking it after a few days.    IBD Details:  Dx Date:  August 2023  Disease type/distribution:  Ulcerative colitis/involvement from the rectum to the descending colon  Current Treatment:  None Start Date:  Response:   Optimized:   Adverse reactions:   Prior surgeries:  None  CRP Elevation:  Unknown  calprotectin: Unknown  Disease Complications:  None  Extraintestinal manifestations:  None  Prior treatments:   Steroids:  None  5ASA:  Sulfasalazine-took for a few days but did not tolerate  IMM:  None  TNF Inh:  None   Anti-Integrin:  None   IL 12/23:  None  ALONSO Inh:  None    Previous Clinical Trials:  None    Last Colonoscopy:  August 2023-inflammatory changes involving the rectum, sigmoid, descending colon, otherwise normal    Other Endoscopies:  None    Imaging:   MRE:  None   CT:  August 2023-rectal thickening concerning for likely proctitis   Other:  No other pertinent imaging    Pertinent Labs:  No results found for: "SEDRATE", "CRP"  No results found for: "TTGIGA", "IGA"  Lab Results   Component Value Date    TSH 1.601 08/23/2022    FREET4 1.08 03/21/2019     No results found for: "HJSNKZCJ25BU", "WVNPWWIF58"  Lab Results   Component Value Date    HEPBSAG Negative 04/04/2019     Lab Results   Component Value Date    HRA17KVBG Negative 04/04/2019     No results found for: "NIL", "TBAG", "TBAGNIL", "MITOGENNIL", "TBGOLD", "TSPOTSCREN"  No results found for: "TPTMINTERP", "TPMTRESULT"  No results found for: "STOOLCULTURE", "NGXKTGUHFI7B", "HFWBDIJELE2N", "CDIFFICILEAN", "CDIFFTOX", "CDIFFICILEBY"  No results found " "for: "CALPROTECTIN"    Therapeutic Drug Monitoring Labs:  No results found for: "PROMETH"  No results found for: "ANSADAINIT", "INFLIXIMAB", "INFLIXINTERP"    Vaccinations:  No results found for: "HEPBSAB"  No results found for: "HEPAIGG"  No results found for: "VARICELLAZOS", "VARICELLAINT"  Immunization History   Administered Date(s) Administered    COVID-19, MRNA, LN-S, PF (Pfizer) (Purple Cap) 12/18/2020, 01/08/2021, 12/06/2021    COVID-19, mRNA, LNP-S, bivalent booster, PF (PFIZER OMICRON) 11/11/2022    DTaP 1988, 03/21/1989, 06/18/1989, 03/20/1990    HIB 07/25/1990    IPV 1988, 03/21/1989, 03/20/1990    Influenza - Quadrivalent 01/05/2015    Influenza - Quadrivalent - PF *Preferred* (6 months and older) 09/11/2019    MMR 03/20/1990    PPD Test 04/22/2016    Tdap 06/27/2016, 09/11/2019         Review of Systems  Review of Systems   Constitutional:  Negative for chills, fever and weight loss.   HENT:  Negative for sore throat.    Eyes:  Negative for pain, discharge and redness.   Respiratory:  Negative for cough, shortness of breath and wheezing.    Cardiovascular:  Negative for chest pain, orthopnea and leg swelling.   Gastrointestinal:  Positive for abdominal pain, blood in stool and heartburn. Negative for constipation, diarrhea, melena, nausea and vomiting.   Genitourinary:  Negative for dysuria, frequency and urgency.   Musculoskeletal:  Negative for back pain, joint pain and myalgias.   Skin:  Negative for itching and rash.   Neurological:  Negative for focal weakness and seizures.   Endo/Heme/Allergies:  Does not bruise/bleed easily.   Psychiatric/Behavioral:  Negative for depression. The patient is nervous/anxious.        Medical History:   Past Medical History:   Diagnosis Date    Anxiety     Ectopic pregnancy 02/06/2021    Female bladder prolapse     Ulcerative colitis        Surgical History:  Past Surgical History:   Procedure Laterality Date    COLONOSCOPY N/A 8/29/2023    Procedure: " COLONOSCOPY;  Surgeon: González Fernandez MD;  Location: [facility] (4TH FLR);  Service: Colon and Rectal;  Laterality: N/A;  Referred by: Dr. Wang  Prep: PEG  Route instructions sent: portal  Other concerns: FYI Pt seen by Dr. Wang today with notes that state: Plan  -colonoscopy within a week  -continue taking cipro/flagyl  - concern for ulcerative proctitis  SW    DIAGNOSTIC LAPAROSCOPY N/A 2/7/2021    Procedure: LAPAROSCOPY, DIAGNOSTIC;  Surgeon: Tea Schneider MD;  Location: [facility] OR;  Service: OB/GYN;  Laterality: N/A;    LAPAROSCOPIC SALPINGECTOMY Left 2/7/2021    Procedure: SALPINGECTOMY, LAPAROSCOPIC;  Surgeon: Tea Schneider MD;  Location: [facility] OR;  Service: OB/GYN;  Laterality: Left;       Family History:   Family History   Problem Relation Age of Onset    Hypertension Mother     Alcohol abuse Father         history of s/p liver transplant    Breast cancer Neg Hx     Colon cancer Neg Hx     Ovarian cancer Neg Hx        Social History:   Social History     Tobacco Use    Smoking status: Never     Passive exposure: Never    Smokeless tobacco: Never   Substance Use Topics    Alcohol use: Yes     Comment: rarely    Drug use: Never       Allergies: Reviewed    Home Medications:   Medication List with Changes/Refills   New Medications    MESALAMINE (CANASA) 1000 MG SUPP    Place 1 suppository (1,000 mg total) rectally nightly.    MESALAMINE (LIALDA) 1.2 GRAM TBEC    Take 2 tablets (2.4 g total) by mouth daily with breakfast.   Current Medications    ACETAMINOPHEN (TYLENOL) 500 MG TABLET    Take 500 mg by mouth every 6 (six) hours as needed for Pain.    HYDROXYZINE HCL (ATARAX) 25 MG TABLET    Take 25 mg by mouth as needed for Anxiety.    LABETALOL (NORMODYNE) 100 MG TABLET    TAKE 1 TABLET BY MOUTH TWICE A DAY    LACTOBACILLUS RHAMNOSUS GG (CULTURELLE) 10 BILLION CELL CAPSULE    Take 1 capsule by mouth once daily.    PNV COMB12/IRON CB/FA1/DSS/DHA (PRENATAL 12-IRON-FA1-DSS-DHA ORAL)    Take by  mouth.    PRAMOXINE-HYDROCORTISONE (PROCTOCREAM-HC) 1-1 % RECTAL CREAM    Place rectally 3 (three) times daily.   Discontinued Medications    LOPERAMIDE (IMODIUM A-D) 2 MG TAB    Take 1 tablet (2 mg total) by mouth 4 (four) times daily as needed.    SULFASALAZINE (AZULFIDINE) 500 MG EC TABLET    Take 1 tablet (500 mg total) by mouth 2 (two) times a day.       Physical Exam:  Vital Signs:  /88 (BP Location: Right arm, Patient Position: Sitting)   Pulse 67   Temp 97.9 °F (36.6 °C)   Wt 77.4 kg (170 lb 10.2 oz)   LMP 08/09/2023 (Exact Date)   BMI 32.24 kg/m²   Body mass index is 32.24 kg/m².    Physical Exam  Vitals and nursing note reviewed.   Constitutional:       General: She is not in acute distress.     Appearance: Normal appearance. She is well-developed. She is not ill-appearing or toxic-appearing.   Eyes:      Conjunctiva/sclera: Conjunctivae normal.      Pupils: Pupils are equal, round, and reactive to light.   Neck:      Thyroid: No thyromegaly.   Cardiovascular:      Rate and Rhythm: Normal rate and regular rhythm.      Heart sounds: Normal heart sounds. No murmur heard.  Pulmonary:      Effort: Pulmonary effort is normal.      Breath sounds: Normal breath sounds. No wheezing or rales.   Abdominal:      General: Bowel sounds are normal. There is no distension.      Palpations: Abdomen is soft. There is no mass.      Tenderness: There is no abdominal tenderness.   Musculoskeletal:         General: No tenderness. Normal range of motion.      Right lower leg: No edema.      Left lower leg: No edema.   Lymphadenopathy:      Cervical: No cervical adenopathy.   Skin:     Findings: No erythema or rash.   Neurological:      General: No focal deficit present.      Mental Status: She is alert and oriented to person, place, and time.   Psychiatric:         Mood and Affect: Mood normal.         Behavior: Behavior normal.         Thought Content: Thought content normal.         Judgment: Judgment normal.          Labs: reviewed and pertinent noted above    Assessment/Plan:  Keisha Mena is a 32 y.o. female with left-sided ulcerative colitis. The following issues were addresssed:    1. Ulcerative rectosigmoiditis with rectal bleeding    2. Heartburn      1. Ulcerative colitis:  Overall she has fairly mild disease endoscopically and her disease is mild from a symptom standpoint.  She unfortunately did not tolerate sulfasalazine.  Today we would a long discussion about the diagnosis of ulcerative colitis in the fact this is a chronic condition.  We discussed multiple different treatment options today.  It sounds like the majority of her symptoms related to rectal inflammation so I would recommend starting Canasa suppositories once daily.  We considered enema use but given the tenesmus issues that she is having I do not think she would tolerate an enema very well.  In addition to the Canasa suppository I am going to go ahead and start Lialda 2.4 g daily.  We will check baseline labs today as well as a baseline stool calprotectin level before she starts therapy.  Once she is been on therapy for 3 months we will plan to follow up the calprotectin level if it is elevated at baseline.  We had a long discussion today about the management and monitoring of Crohn's disease as well.    2. Heartburn: Start omeprazole 20 mg daily before supper.  In the future we will try to stop this once her colitis is under better control.       # IBD specific health maintenance:  Colon cancer surveillance:  Start in 2031    Annual:  - Eye exam:  Not applicable  - Skin exam (if on IMM/TNF):  Not applicable  - reminded pt to use sunblock/hats/sunprotective clothing  - PAP (if immunosuppressed):  Up-to-date    DEXA:  Not applicable    Vitamin D:  Check today    Vaccines:    Influenza:  Recommend annual vaccination   Pneumovax:  Discuss in the future   HAV:  Check serology   HBV:  Check serology   Tdap:  Up-to-date   MMR:  Vaccinated-check  serologies   VZV:  Check serology   HZV:  Not applicable   HPV:  Defer to Gynecology   Meningococcus:  Not applicable   COVID: Vaccinated    Follow up: Follow up in about 4 months (around 1/15/2024).    Thank you again for sending Keisha Trina to see Dr. Dudley Cross today at the [facility]. Please don't hesitate to contact Dr. Cross if there are any questions regarding this evaluation, or if you have any other patients with inflammatory bowel disease for whom you would like a consultation. You can reach Dr. Cross at [phone] or by email at [email or web address]    Jaret Cross MD  Department of Gastroenterology  Inflammatory Bowel Disease

## 2023-09-16 ENCOUNTER — LAB VISIT (OUTPATIENT)
Dept: LAB | Facility: HOSPITAL | Age: 33
End: 2023-09-16
Attending: INTERNAL MEDICINE
Payer: COMMERCIAL

## 2023-09-16 ENCOUNTER — PATIENT MESSAGE (OUTPATIENT)
Dept: GASTROENTEROLOGY | Facility: CLINIC | Age: 33
End: 2023-09-16
Payer: COMMERCIAL

## 2023-09-16 DIAGNOSIS — K51.311 ULCERATIVE RECTOSIGMOIDITIS WITH RECTAL BLEEDING: ICD-10-CM

## 2023-09-16 LAB
GAMMA INTERFERON BACKGROUND BLD IA-ACNC: 0.03 IU/ML
M TB IFN-G CD4+ BCKGRND COR BLD-ACNC: 0.02 IU/ML
M TB IFN-G CD4+ BCKGRND COR BLD-ACNC: 0.04 IU/ML
MITOGEN IGNF BCKGRD COR BLD-ACNC: 9.97 IU/ML
TB GOLD PLUS: NEGATIVE

## 2023-09-16 PROCEDURE — 83993 ASSAY FOR CALPROTECTIN FECAL: CPT | Performed by: INTERNAL MEDICINE

## 2023-09-18 DIAGNOSIS — K51.311 ULCERATIVE RECTOSIGMOIDITIS WITH RECTAL BLEEDING: Primary | ICD-10-CM

## 2023-09-18 LAB
HBV SURFACE AB SER QL IA: POSITIVE
HBV SURFACE AB SERPL IA-ACNC: 87 MIU/ML
RUBV IGG SER-ACNC: 50.9 IU/ML
RUBV IGG SER-IMP: REACTIVE

## 2023-09-18 RX ORDER — MESALAMINE 1000 MG/1
1000 SUPPOSITORY RECTAL NIGHTLY
Qty: 30 SUPPOSITORY | Refills: 5 | Status: SHIPPED | OUTPATIENT
Start: 2023-09-18 | End: 2024-03-16

## 2023-09-19 ENCOUNTER — OFFICE VISIT (OUTPATIENT)
Dept: INTERNAL MEDICINE | Facility: CLINIC | Age: 33
End: 2023-09-19
Payer: COMMERCIAL

## 2023-09-19 VITALS
HEIGHT: 61 IN | OXYGEN SATURATION: 99 % | SYSTOLIC BLOOD PRESSURE: 112 MMHG | HEART RATE: 84 BPM | DIASTOLIC BLOOD PRESSURE: 86 MMHG | BODY MASS INDEX: 32.22 KG/M2 | WEIGHT: 170.63 LBS | RESPIRATION RATE: 16 BRPM

## 2023-09-19 DIAGNOSIS — G50.0 TRIGEMINAL NEURALGIA OF LEFT SIDE OF FACE: ICD-10-CM

## 2023-09-19 DIAGNOSIS — M54.12 CERVICAL RADICULOPATHY: ICD-10-CM

## 2023-09-19 DIAGNOSIS — K51.90 ULCERATIVE COLITIS WITHOUT COMPLICATIONS, UNSPECIFIED LOCATION: Primary | ICD-10-CM

## 2023-09-19 DIAGNOSIS — F41.9 ANXIETY: ICD-10-CM

## 2023-09-19 DIAGNOSIS — K21.9 GASTROESOPHAGEAL REFLUX DISEASE WITHOUT ESOPHAGITIS: ICD-10-CM

## 2023-09-19 LAB
6-METHYLMERCAPTOPURINE RIBOSIDE: 6.25 NMOL/ML/H (ref 5.04–9.57)
6-METHYLMERCAPTOPURINE: 4.02 NMOL/ML/H (ref 3–6.66)
6-METHYLTHIOGUANINE RIBOSIDE: 3.36 NMOL/ML/H (ref 2.7–5.84)
TPMT INTERPRETATION: NORMAL
TPMT REVIEWED BY: NORMAL

## 2023-09-19 PROCEDURE — 3074F PR MOST RECENT SYSTOLIC BLOOD PRESSURE < 130 MM HG: ICD-10-PCS | Mod: CPTII,S$GLB,, | Performed by: INTERNAL MEDICINE

## 2023-09-19 PROCEDURE — 99999 PR PBB SHADOW E&M-EST. PATIENT-LVL IV: ICD-10-PCS | Mod: PBBFAC,,, | Performed by: INTERNAL MEDICINE

## 2023-09-19 PROCEDURE — 3074F SYST BP LT 130 MM HG: CPT | Mod: CPTII,S$GLB,, | Performed by: INTERNAL MEDICINE

## 2023-09-19 PROCEDURE — 1160F PR REVIEW ALL MEDS BY PRESCRIBER/CLIN PHARMACIST DOCUMENTED: ICD-10-PCS | Mod: CPTII,S$GLB,, | Performed by: INTERNAL MEDICINE

## 2023-09-19 PROCEDURE — 1160F RVW MEDS BY RX/DR IN RCRD: CPT | Mod: CPTII,S$GLB,, | Performed by: INTERNAL MEDICINE

## 2023-09-19 PROCEDURE — 3079F DIAST BP 80-89 MM HG: CPT | Mod: CPTII,S$GLB,, | Performed by: INTERNAL MEDICINE

## 2023-09-19 PROCEDURE — 99215 PR OFFICE/OUTPT VISIT, EST, LEVL V, 40-54 MIN: ICD-10-PCS | Mod: S$GLB,,, | Performed by: INTERNAL MEDICINE

## 2023-09-19 PROCEDURE — 1159F MED LIST DOCD IN RCRD: CPT | Mod: CPTII,S$GLB,, | Performed by: INTERNAL MEDICINE

## 2023-09-19 PROCEDURE — 1159F PR MEDICATION LIST DOCUMENTED IN MEDICAL RECORD: ICD-10-PCS | Mod: CPTII,S$GLB,, | Performed by: INTERNAL MEDICINE

## 2023-09-19 PROCEDURE — 3008F BODY MASS INDEX DOCD: CPT | Mod: CPTII,S$GLB,, | Performed by: INTERNAL MEDICINE

## 2023-09-19 PROCEDURE — 3008F PR BODY MASS INDEX (BMI) DOCUMENTED: ICD-10-PCS | Mod: CPTII,S$GLB,, | Performed by: INTERNAL MEDICINE

## 2023-09-19 PROCEDURE — 99215 OFFICE O/P EST HI 40 MIN: CPT | Mod: S$GLB,,, | Performed by: INTERNAL MEDICINE

## 2023-09-19 PROCEDURE — 99999 PR PBB SHADOW E&M-EST. PATIENT-LVL IV: CPT | Mod: PBBFAC,,, | Performed by: INTERNAL MEDICINE

## 2023-09-19 PROCEDURE — 3079F PR MOST RECENT DIASTOLIC BLOOD PRESSURE 80-89 MM HG: ICD-10-PCS | Mod: CPTII,S$GLB,, | Performed by: INTERNAL MEDICINE

## 2023-09-19 NOTE — PROGRESS NOTES
"Subjective:       Patient ID: Keisha Mena is a 32 y.o. female.    Chief Complaint: tingling sensation (In neck and face), Follow-up, and Sore Throat      HPI:  Patient is known to me and presents with tingling in neck and face. She reports she was at work and bent forward. When she stool up she felt a shooting sensation from the left ear into the jaw and neck. Lasted several minutes. Later in the day she felt a strange sensation in her neck/throat "like pop rocks". She thinks she felt some numbness in the jaw area but that is resolved.   Now having some difficulty swallowing--feels like she has to catch herself when swallowing her spit      She continues to have intermittent tingling in b/l arms. Diagnosed as cervical radiculopathy in the past. Did PT without much improvement. Right now sx are management but does want to make notes sx are still present and not resolved.     She was following with Dr. Cantor and now referred to fertility specialist. 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 She is feeling sad but appropriate    When I saw her last she was having rectal/anal pain. Initial had concern for developing abscess and started antibx. Pain persisted and had ED visit which ultimately lead to colorectal eval, colonoscopy which showed ulcerative colitis. She had first visit with Dr. Cross 9/15/23 and started on mesalamine.    She was also prescribed omeprazole for relfux but hasn't started yet.       Past Medical History:   Diagnosis Date    Anxiety     Ectopic pregnancy 02/06/2021    Female bladder prolapse     Ulcerative colitis 2023       Family History   Problem Relation Age of Onset    Hypertension Mother     Alcohol abuse Father         history of s/p liver transplant    Breast cancer Neg Hx     Colon cancer Neg Hx     Ovarian cancer Neg Hx        Social History     Socioeconomic History    Marital status:      Spouse name: Paolo Mena    Number of " children: 1   Tobacco Use    Smoking status: Never     Passive exposure: Never    Smokeless tobacco: Never   Substance and Sexual Activity    Alcohol use: Yes     Comment: rarely    Drug use: Never    Sexual activity: Yes     Partners: Male     Birth control/protection: None     Comment:      Social Determinants of Health     Financial Resource Strain: Low Risk  (5/20/2020)    Overall Financial Resource Strain (CARDIA)     Difficulty of Paying Living Expenses: Not hard at all   Food Insecurity: No Food Insecurity (5/20/2020)    Hunger Vital Sign     Worried About Running Out of Food in the Last Year: Never true     Ran Out of Food in the Last Year: Never true   Transportation Needs: No Transportation Needs (5/20/2020)    PRAPARE - Transportation     Lack of Transportation (Medical): No     Lack of Transportation (Non-Medical): No   Physical Activity: Insufficiently Active (5/20/2020)    Exercise Vital Sign     Days of Exercise per Week: 1 day     Minutes of Exercise per Session: 10 min   Stress: Stress Concern Present (5/20/2020)    [employer] - Occupational Stress Questionnaire     Feeling of Stress : To some extent   Social Connections: Unknown (5/20/2020)    Social Connection and Isolation Panel [NHANES]     Frequency of Communication with Friends and Family: More than three times a week     Frequency of Social Gatherings with Friends and Family: Once a week     Active Member of Clubs or Organizations: No     Attends Club or Organization Meetings: Never     Marital Status:        Review of Systems   Constitutional:  Negative for activity change, fatigue, fever and unexpected weight change.   HENT:  Positive for sore throat and trouble swallowing. Negative for congestion, ear pain, hearing loss and rhinorrhea.    Eyes:  Negative for redness and visual disturbance.   Respiratory:  Negative for cough, shortness of breath and wheezing.    Cardiovascular:  Negative for chest  pain, palpitations and leg swelling.   Gastrointestinal:  Negative for abdominal pain, constipation, diarrhea, nausea and vomiting.   Genitourinary:  Negative for dysuria, frequency and urgency.   Musculoskeletal:  Negative for back pain, joint swelling and neck pain.   Skin:  Negative for color change, rash and wound.   Neurological:  Positive for numbness. Negative for dizziness, tremors, weakness, light-headedness and headaches.   Psychiatric/Behavioral:  The patient is nervous/anxious.          Objective:      Physical Exam  Vitals reviewed.   Constitutional:       General: She is not in acute distress.     Appearance: She is well-developed.   HENT:      Head: Normocephalic and atraumatic.      Right Ear: External ear normal.      Left Ear: External ear normal.      Nose: Nose normal.   Eyes:      General:         Right eye: No discharge.         Left eye: No discharge.      Conjunctiva/sclera: Conjunctivae normal.   Neck:      Thyroid: No thyromegaly.   Cardiovascular:      Rate and Rhythm: Normal rate and regular rhythm.      Heart sounds: No murmur heard.  Pulmonary:      Effort: Pulmonary effort is normal. No respiratory distress.      Breath sounds: Normal breath sounds. No wheezing or rales.   Abdominal:      General: Bowel sounds are normal. There is no distension.      Palpations: Abdomen is soft.      Tenderness: There is no abdominal tenderness.   Skin:     General: Skin is warm and dry.   Neurological:      Mental Status: She is alert and oriented to person, place, and time. Mental status is at baseline.      Cranial Nerves: No cranial nerve deficit.      Sensory: No sensory deficit.      Motor: No weakness.   Psychiatric:         Behavior: Behavior normal.         Thought Content: Thought content normal.         Assessment:       1. Ulcerative colitis without complications, unspecified location    2. Cervical radiculopathy    3. Anxiety    4. Gastroesophageal reflux disease without esophagitis    5.  Trigeminal neuralgia of left side of face        Plan:       1. Ulcerative colitis without complications, unspecified location  New problem  Established with GI  On mesalamine--sx better  Cont GI follow up    2. Cervical radiculopathy  Chronic stable  Not resolved but no significantly worse  Discussed and will focus on UC and fertility issues and revisit this down the road    3. Anxiety  Chronic stable  Overall, given her several new diagnosis and medal stressors, she is doing well  Ok to use PRN meds if needed  Offered support and can see me any time if med adjustment needed    4. Gastroesophageal reflux disease without esophagitis  New problem  I wonder if this is causing the strange sensation in her throat and difficulty swallowing  Start PPI as planned  Diet modifications discussed    5. Trigeminal neuralgia of left side of face  ?????  New diagnosis  Questionable diagnosis  Exam is reassuring. No sensory changes, no weakness  Clinically, her history sounds consistent  We discussed and before dedicating her to a new medication we will see if sx reocurr. Discussed pathophysiology of trigeminal neuralgia at length. Asked her to keep me updated       I spent 50 mins in total on this patient encounter today including review of prior medical records (clinic notes, pathology report, C-scope report, hystersalpinography report), direct face to face discussion with the patient, joint developing of treatment plan and documentation in the medical record.

## 2023-09-21 ENCOUNTER — PATIENT MESSAGE (OUTPATIENT)
Dept: GASTROENTEROLOGY | Facility: CLINIC | Age: 33
End: 2023-09-21
Payer: COMMERCIAL

## 2023-09-21 LAB — CALPROTECTIN STL-MCNT: <27.1 MCG/G

## 2024-01-23 ENCOUNTER — OFFICE VISIT (OUTPATIENT)
Dept: GASTROENTEROLOGY | Facility: CLINIC | Age: 34
End: 2024-01-23
Payer: COMMERCIAL

## 2024-01-23 VITALS
OXYGEN SATURATION: 100 % | HEIGHT: 61 IN | TEMPERATURE: 97 F | HEART RATE: 80 BPM | DIASTOLIC BLOOD PRESSURE: 87 MMHG | BODY MASS INDEX: 33.47 KG/M2 | SYSTOLIC BLOOD PRESSURE: 133 MMHG | WEIGHT: 177.25 LBS

## 2024-01-23 DIAGNOSIS — K51.90 ULCERATIVE COLITIS WITHOUT COMPLICATIONS, UNSPECIFIED LOCATION: ICD-10-CM

## 2024-01-23 PROCEDURE — 3008F BODY MASS INDEX DOCD: CPT | Mod: CPTII,S$GLB,, | Performed by: INTERNAL MEDICINE

## 2024-01-23 PROCEDURE — 3079F DIAST BP 80-89 MM HG: CPT | Mod: CPTII,S$GLB,, | Performed by: INTERNAL MEDICINE

## 2024-01-23 PROCEDURE — 99214 OFFICE O/P EST MOD 30 MIN: CPT | Mod: S$GLB,,, | Performed by: INTERNAL MEDICINE

## 2024-01-23 PROCEDURE — 1160F RVW MEDS BY RX/DR IN RCRD: CPT | Mod: CPTII,S$GLB,, | Performed by: INTERNAL MEDICINE

## 2024-01-23 PROCEDURE — 3075F SYST BP GE 130 - 139MM HG: CPT | Mod: CPTII,S$GLB,, | Performed by: INTERNAL MEDICINE

## 2024-01-23 PROCEDURE — G2211 COMPLEX E/M VISIT ADD ON: HCPCS | Mod: S$GLB,,, | Performed by: INTERNAL MEDICINE

## 2024-01-23 PROCEDURE — 1159F MED LIST DOCD IN RCRD: CPT | Mod: CPTII,S$GLB,, | Performed by: INTERNAL MEDICINE

## 2024-01-23 RX ORDER — MESALAMINE 1.2 G/1
4.8 TABLET, DELAYED RELEASE ORAL
Qty: 120 TABLET | Refills: 5 | Status: SHIPPED | OUTPATIENT
Start: 2024-01-23

## 2024-01-23 RX ORDER — OMEPRAZOLE 20 MG/1
20 TABLET, DELAYED RELEASE ORAL EVERY MORNING
COMMUNITY

## 2024-01-23 NOTE — PROGRESS NOTES
[facility]          Inflammatory Bowel Disease Follow Up Note         TODAY'S VISIT DATE:  1/23/2024    Reason for Consult:    Chief Complaint   Patient presents with    Ulcerative Colitis       PCP: Lisa Fung      Referring MD:   No ref. provider found    History of Present Illness:  Keisha Mena who is a 33 y.o. female is being seen today at the [facility] on 01/23/2024 for inflammatory bowel disease- ulcerative colitis.  She reports that she is doing pretty well.  She is having 1-2 bowel movements daily.  They are for the most part forward.  Occasionally she does still see some blood in his stools.  This has happened about 4 times since our last appointment.  The most recent 2 times were last week.  She is taking Canasa suppositories daily.  She feels like these helped significantly with the tenesmus and urgency issues.  She has been taking Lialda 2.4 g daily but does not really feel like that has made much of a benefit.  Overall she feels pretty well.  She reports that her heartburn issues are much better controlled with omeprazole 20 mg daily.    IBD History:  In early August she began having rectal pain with bowel movements as well as tenesmus and urgency.  She saw primary care doctor it was initially concern for possible perianal abscess but none was ever identified.  She was treated with antibiotics and took this for about 7 days.  About 2 weeks after her initial presentation she developed a fever and went to the emergency room.  A CT scan at that time showed mild wall thickening of the rectum concerning for possible proctitis.  She was subsequently seen by Colorectal surgery and a colonoscopy was scheduled.  Her colonoscopy occurred on August 29, 2023.  At that time she was found to have inflammatory changes of the rectum, sigmoid colon, and descending colon.  The remainder of the colon was normal.  Biopsy showed active inflammation  "but no definitive chronic changes.  She was given sulfasalazine 500 mg twice daily and reports that while taking this she continued to feel bad and actually developed palpitations, headaches, and ear pain.  She stopped taking it after a few days.  In September 2023 we started Canasa suppositories daily and Lialda 2.4 g daily.    IBD Details:  Dx Date:  August 2023  Disease type/distribution:  Ulcerative colitis/involvement from the rectum to the descending colon  Current Treatment:  Canasa/Lialda 2.4 g daily Start Date:  September 2023 Response:  Incomplete   Optimized:  Pending Adverse reactions:  None  Prior surgeries:  None  CRP Elevation:  Not elevated  calprotectin:  Low at baseline  Disease Complications:  None  Extraintestinal manifestations:  None  Prior treatments:   Steroids:  None  5ASA:  Currently on Canasa and Lialda  IMM:  None  TNF Inh:  None   Anti-Integrin:  None   IL 12/23:  None  ALONSO Inh:  None    Previous Clinical Trials:  None    Last Colonoscopy:  August 2023-inflammatory changes involving the rectum, sigmoid, descending colon, otherwise normal    Other Endoscopies:  None    Imaging:   MRE:  None   CT:  August 2023-rectal thickening concerning for likely proctitis   Other:  No other pertinent imaging    Pertinent Labs:  Lab Results   Component Value Date    CRP <0.3 09/15/2023     No results found for: "TTGIGA", "IGA"  Lab Results   Component Value Date    TSH 1.601 08/23/2022    FREET4 1.08 03/21/2019     Lab Results   Component Value Date    SSQKXPQJ27XV 38 09/15/2023     Lab Results   Component Value Date    HEPBSAG Non-reactive 09/15/2023    HEPBCAB Non-reactive 09/15/2023    HEPCAB Non-reactive 09/15/2023     Lab Results   Component Value Date    MAA23AZRU Negative 04/04/2019     Lab Results   Component Value Date    NIL 0.37643 09/15/2023    MITOGENNIL 9.967 09/15/2023     Lab Results   Component Value Date    TPTMINTERP SEE BELOW 09/15/2023     No results found for: "STOOLCULTURE", " ""HKDODJWYLJ1P", "MPJJNCNTQD2V", "CDIFFICILEAN", "CDIFFTOX", "CDIFFICILEBY"  Lab Results   Component Value Date    CALPROTECTIN <27.1 09/16/2023       Therapeutic Drug Monitoring Labs:  No results found for: "PROMETH"  No results found for: "ANSADAINIT", "INFLIXIMAB", "INFLIXINTERP"    Vaccinations:  No results found for: "HEPBSAB"  Lab Results   Component Value Date    HEPAIGG Non-reactive 09/15/2023     Lab Results   Component Value Date    VARICELLAZOS 1982.00 09/15/2023    VARICELLAINT Positive 09/15/2023     Immunization History   Administered Date(s) Administered    COVID-19, MRNA, LN-S, PF (Pfizer) (Purple Cap) 12/18/2020, 01/08/2021, 12/06/2021    COVID-19, mRNA, LNP-S, bivalent booster, PF (PFIZER OMICRON) 11/11/2022    DTaP 1988, 03/21/1989, 06/18/1989, 03/20/1990    HIB 07/25/1990    IPV 1988, 03/21/1989, 03/20/1990    Influenza - Quadrivalent 01/05/2015    Influenza - Quadrivalent - PF *Preferred* (6 months and older) 09/11/2019    MMR 03/20/1990    PPD Test 04/22/2016    Tdap 06/27/2016, 09/11/2019         Review of Systems  Review of Systems   Constitutional:  Negative for chills, fever and weight loss.   HENT:  Negative for sore throat.    Eyes:  Negative for pain, discharge and redness.   Respiratory:  Negative for cough, shortness of breath and wheezing.    Cardiovascular:  Negative for chest pain, orthopnea and leg swelling.   Gastrointestinal:  Positive for blood in stool. Negative for abdominal pain, constipation, diarrhea, heartburn, melena, nausea and vomiting.   Genitourinary:  Negative for dysuria, frequency and urgency.   Musculoskeletal:  Negative for back pain, joint pain and myalgias.   Skin:  Negative for itching and rash.   Neurological:  Negative for focal weakness and seizures.   Endo/Heme/Allergies:  Does not bruise/bleed easily.   Psychiatric/Behavioral:  Negative for depression. The patient is not nervous/anxious.        Medical History:   Past Medical History: "   Diagnosis Date    Anxiety     Ectopic pregnancy 02/06/2021    Female bladder prolapse     Ulcerative colitis 2023       Surgical History:  Past Surgical History:   Procedure Laterality Date    COLONOSCOPY N/A 8/29/2023    Procedure: COLONOSCOPY;  Surgeon: González Fernandez MD;  Location: [facility] (4TH FLR);  Service: Colon and Rectal;  Laterality: N/A;  Referred by: Dr. Wang  Prep: PEG  Route instructions sent: portal  Other concerns: FYI Pt seen by Dr. Wang today with notes that state: Plan  -colonoscopy within a week  -continue taking cipro/flagyl  - concern for ulcerative proctitis  SW    DIAGNOSTIC LAPAROSCOPY N/A 2/7/2021    Procedure: LAPAROSCOPY, DIAGNOSTIC;  Surgeon: Tea Schneider MD;  Location: [facility] OR;  Service: OB/GYN;  Laterality: N/A;    LAPAROSCOPIC SALPINGECTOMY Left 2/7/2021    Procedure: SALPINGECTOMY, LAPAROSCOPIC;  Surgeon: Tea Schneider MD;  Location: [facility] OR;  Service: OB/GYN;  Laterality: Left;       Family History:   Family History   Problem Relation Age of Onset    Hypertension Mother     Alcohol abuse Father         history of s/p liver transplant    Breast cancer Neg Hx     Colon cancer Neg Hx     Ovarian cancer Neg Hx        Social History:   Social History     Tobacco Use    Smoking status: Never     Passive exposure: Never    Smokeless tobacco: Never   Substance Use Topics    Alcohol use: Yes     Comment: rarely    Drug use: Never       Allergies: Reviewed    Home Medications:   Medication List with Changes/Refills   Current Medications    ACETAMINOPHEN (TYLENOL) 500 MG TABLET    Take 500 mg by mouth every 6 (six) hours as needed for Pain.    HYDROXYZINE HCL (ATARAX) 25 MG TABLET    Take 25 mg by mouth as needed for Anxiety.    LABETALOL (NORMODYNE) 100 MG TABLET    TAKE 1 TABLET BY MOUTH TWICE A DAY    LACTOBACILLUS RHAMNOSUS GG (CULTURELLE) 10 BILLION CELL CAPSULE    Take 1 capsule by mouth once daily.    MESALAMINE (CANASA) 1000 MG SUPP    Place 1 suppository  "(1,000 mg total) rectally nightly.    OMEPRAZOLE (PRILOSEC OTC) 20 MG TABLET    Take 20 mg by mouth every morning.    PNV COMB12/IRON CB/FA1/DSS/DHA (PRENATAL 12-IRON-FA1-DSS-DHA ORAL)    Take by mouth.    PRAMOXINE-HYDROCORTISONE (PROCTOCREAM-HC) 1-1 % RECTAL CREAM    Place rectally 3 (three) times daily.   Changed and/or Refilled Medications    Modified Medication Previous Medication    MESALAMINE (LIALDA) 1.2 GRAM TBEC mesalamine (LIALDA) 1.2 gram TbEC       Take 4 tablets (4.8 g total) by mouth daily with breakfast.    Take 2 tablets (2.4 g total) by mouth daily with breakfast.       Physical Exam:  Vital Signs:  /87 (BP Location: Right arm, Patient Position: Sitting)   Pulse 80   Temp 97 °F (36.1 °C) (Temporal)   Ht 5' 1" (1.549 m)   Wt 80.4 kg (177 lb 4 oz)   SpO2 100%   BMI 33.49 kg/m²   Body mass index is 33.49 kg/m².    Physical Exam  Vitals and nursing note reviewed.   Constitutional:       General: She is not in acute distress.     Appearance: Normal appearance. She is well-developed. She is not ill-appearing or toxic-appearing.   Eyes:      Conjunctiva/sclera: Conjunctivae normal.      Pupils: Pupils are equal, round, and reactive to light.   Neck:      Thyroid: No thyromegaly.   Cardiovascular:      Rate and Rhythm: Normal rate and regular rhythm.      Heart sounds: Normal heart sounds. No murmur heard.  Pulmonary:      Effort: Pulmonary effort is normal.      Breath sounds: Normal breath sounds. No wheezing or rales.   Abdominal:      General: Bowel sounds are normal. There is no distension.      Palpations: Abdomen is soft. There is no mass.      Tenderness: There is no abdominal tenderness.   Musculoskeletal:         General: No tenderness. Normal range of motion.      Right lower leg: No edema.      Left lower leg: No edema.   Lymphadenopathy:      Cervical: No cervical adenopathy.   Skin:     Findings: No erythema or rash.   Neurological:      General: No focal deficit present.      " Mental Status: She is alert and oriented to person, place, and time.   Psychiatric:         Mood and Affect: Mood normal.         Behavior: Behavior normal.         Thought Content: Thought content normal.         Judgment: Judgment normal.         Labs: reviewed and pertinent noted above    Assessment/Plan:  Keisha Mena is a 33 y.o. female with left-sided ulcerative colitis. The following issues were addresssed:    1. Ulcerative colitis without complications, unspecified location      1. Ulcerative colitis:  She is doing okay.  I recommend that we increase the dose Lialda 4.8 g daily.  She will continue the Canasa suppositories daily.  We will arrange for colonoscopy in May to reassess her disease activity.    2. Heartburn:  Much better.  Continue omeprazole.  Can try to stop this in the future.    # IBD specific health maintenance:  Colon cancer surveillance:  Start in 2031    Annual:  - Eye exam:  Not applicable  - Skin exam (if on IMM/TNF):  Not applicable  - reminded pt to use sunblock/hats/sunprotective clothing  - PAP (if immunosuppressed):  Up-to-date    DEXA:  Not applicable    Vitamin D:  Check today    Vaccines:    Influenza:  Up-to-date   Pneumovax:  Discuss in the future   HAV:  Check serology   HBV:  Check serology   Tdap:  Up-to-date   MMR:  Vaccinated-check serologies   VZV:  Check serology   HZV:  Not applicable   HPV:  Defer to Gynecology   Meningococcus:  Not applicable   COVID: Vaccinated    Follow up: Follow up in about 6 months (around 7/23/2024).    Visit today is associated with current or anticipated ongoing medical care related to this patient's single serious condition/complex condition (ulcerative colitis).      Thank you again for sending Keisha Mena to see Dr. Dudley Cross today at the [facility]. Please don't hesitate to contact Dr. Cross if there are any questions regarding this evaluation, or if you have any other patients with inflammatory bowel  disease for whom you would like a consultation. You can reach Dr. Cross at [phone] or by email at [email or web address]    Jaret Cross MD  Department of Gastroenterology  Inflammatory Bowel Disease

## 2024-01-25 ENCOUNTER — TELEPHONE (OUTPATIENT)
Dept: GASTROENTEROLOGY | Facility: CLINIC | Age: 34
End: 2024-01-25
Payer: COMMERCIAL

## 2024-01-25 ENCOUNTER — TELEPHONE (OUTPATIENT)
Dept: ENDOSCOPY | Facility: HOSPITAL | Age: 34
End: 2024-01-25
Payer: COMMERCIAL

## 2024-01-25 DIAGNOSIS — Z12.11 ENCOUNTER FOR SCREENING COLONOSCOPY FOR NON-HIGH-RISK PATIENT: Primary | ICD-10-CM

## 2024-01-25 DIAGNOSIS — K51.919 ULCERATIVE COLITIS WITH COMPLICATION, UNSPECIFIED LOCATION: Primary | ICD-10-CM

## 2024-01-25 NOTE — TELEPHONE ENCOUNTER
"----- Message from Zainab Ibarra RN sent at 2024  5:08 PM CST -----  Procedure: Colonoscopy    Diagnosis: UC    Procedure Timin-4 weeks    #If within 4 weeks selected, please oracio as high priority#    #If greater than 12 weeks, please select "5-12 weeks" and delay sending until 2 months prior to requested date#     Provider:     Location: [address]    Additional Scheduling Information: No scheduling concerns    Prep Specifications:Suprep    Have you attached a patient to this message: yes     "

## 2024-01-25 NOTE — TELEPHONE ENCOUNTER
Spoke to patient to schedule procedure(s) Colonoscopy       Physician to perform procedure(s) Dr. CLARENCE Cross  Date of Procedure (s) 5/2/24  Arrival Time 6:30 AM  Time of Procedure(s) 7:30 AM   Location of Procedure(s) [place] [address]  Type of Rx Prep sent to patient: PEG  Instructions provided to patient via [facility]    Patient was informed on the following information and verbalized understanding. Screening questionnaire reviewed with patient and complete. If procedure requires anesthesia, a responsible adult needs to be present to accompany the patient home, patient cannot drive after receiving anesthesia. Appointment details are tentative, especially check-in time. Patient will receive a prep-op call 7 days prior to confirm check-in time for procedure. If applicable the patient should contact their pharmacy to verify Rx for procedure prep is ready for pick-up. Patient was advised to call the scheduling department at [phone] if pharmacy states no Rx is available. Patient was advised to call the endoscopy scheduling department if any questions or concerns arise.      [facility] Scheduling Department

## 2024-01-25 NOTE — TELEPHONE ENCOUNTER
----- Message from Flores Ryan sent at 1/25/2024  8:51 AM CST -----  Regarding: R/S APPT  Contact: PT @ [phone]  Pt is calling to speak to someone in the office to r/s her appt that she is currently scheduled for; no available appts in Epic. Please call to advise. Thanks.         Patient's DX:   NUTRITION       Additional Info:   Pt prefers a Wednesday or Friday morning.     Nursing

## 2024-03-04 ENCOUNTER — TELEPHONE (OUTPATIENT)
Dept: GASTROENTEROLOGY | Facility: CLINIC | Age: 34
End: 2024-03-04
Payer: COMMERCIAL

## 2024-03-04 NOTE — TELEPHONE ENCOUNTER
Called patient back and she is wanting to get the medication filled at a different pharmacy to see if can get discount through   - explained she can at anytime request the prescription be transferred by going to new pharmacy and having them request transfer from previous pharmacy that has the refills  - all questions answered  ----- Message from Eliane Villagomez sent at 3/4/2024  2:35 PM CST -----  Regarding: Medication  Contact: Pt  [phone]  Pt is calling to speak to staff about rx: mesalamine (LIALDA) 1.2 gram TbEC 120 tablet please call

## 2024-04-12 ENCOUNTER — OFFICE VISIT (OUTPATIENT)
Dept: INTERNAL MEDICINE | Facility: CLINIC | Age: 34
End: 2024-04-12
Payer: COMMERCIAL

## 2024-04-12 VITALS
BODY MASS INDEX: 33.25 KG/M2 | DIASTOLIC BLOOD PRESSURE: 78 MMHG | RESPIRATION RATE: 16 BRPM | WEIGHT: 176.13 LBS | OXYGEN SATURATION: 100 % | HEART RATE: 76 BPM | SYSTOLIC BLOOD PRESSURE: 120 MMHG | HEIGHT: 61 IN

## 2024-04-12 DIAGNOSIS — I10 PRIMARY HYPERTENSION: ICD-10-CM

## 2024-04-12 DIAGNOSIS — G89.29 CHRONIC HEEL PAIN, RIGHT: ICD-10-CM

## 2024-04-12 DIAGNOSIS — M54.12 CERVICAL RADICULOPATHY: Primary | ICD-10-CM

## 2024-04-12 DIAGNOSIS — E66.09 CLASS 1 OBESITY DUE TO EXCESS CALORIES WITH SERIOUS COMORBIDITY AND BODY MASS INDEX (BMI) OF 33.0 TO 33.9 IN ADULT: ICD-10-CM

## 2024-04-12 DIAGNOSIS — M79.671 CHRONIC HEEL PAIN, RIGHT: ICD-10-CM

## 2024-04-12 DIAGNOSIS — G47.30 SLEEP APNEA, UNSPECIFIED TYPE: ICD-10-CM

## 2024-04-12 PROCEDURE — 99999 PR PBB SHADOW E&M-EST. PATIENT-LVL IV: CPT | Mod: PBBFAC,,, | Performed by: INTERNAL MEDICINE

## 2024-04-12 PROCEDURE — 99214 OFFICE O/P EST MOD 30 MIN: CPT | Mod: S$GLB,,, | Performed by: INTERNAL MEDICINE

## 2024-04-12 PROCEDURE — 3078F DIAST BP <80 MM HG: CPT | Mod: CPTII,S$GLB,, | Performed by: INTERNAL MEDICINE

## 2024-04-12 PROCEDURE — 3074F SYST BP LT 130 MM HG: CPT | Mod: CPTII,S$GLB,, | Performed by: INTERNAL MEDICINE

## 2024-04-12 PROCEDURE — 1160F RVW MEDS BY RX/DR IN RCRD: CPT | Mod: CPTII,S$GLB,, | Performed by: INTERNAL MEDICINE

## 2024-04-12 PROCEDURE — 3008F BODY MASS INDEX DOCD: CPT | Mod: CPTII,S$GLB,, | Performed by: INTERNAL MEDICINE

## 2024-04-12 PROCEDURE — 1159F MED LIST DOCD IN RCRD: CPT | Mod: CPTII,S$GLB,, | Performed by: INTERNAL MEDICINE

## 2024-04-12 RX ORDER — PROGESTERONE 200 MG/1
200 CAPSULE ORAL 2 TIMES DAILY
COMMUNITY
Start: 2024-04-05

## 2024-04-12 RX ORDER — POLYETHYLENE GLYCOL-3350 AND ELECTROLYTES WITH FLAVOR PACK 240; 5.84; 2.98; 6.72; 22.72 G/278.26G; G/278.26G; G/278.26G; G/278.26G; G/278.26G
4000 POWDER, FOR SOLUTION ORAL ONCE
Status: ON HOLD | COMMUNITY
Start: 2024-01-25 | End: 2024-05-02 | Stop reason: HOSPADM

## 2024-04-12 RX ORDER — MESALAMINE 1000 MG/1
1000 SUPPOSITORY RECTAL NIGHTLY
COMMUNITY
Start: 2023-12-22

## 2024-04-12 NOTE — PROGRESS NOTES
Subjective:       Patient ID: Keisha Mena is a 33 y.o. female.    Chief Complaint: Numbness (Right hand/arm; several months; never resolved; keeps her up at night)      HPI:  Patient is known to me and presents with right hand numbness. Sx started several months ago. Getting worse. Pain wakes her from sleep. No falls/injuries. She has tried and failed NSAIDs and PT   She is also having worsening right heel pain. Changed shoes with some improvement. Again, no injuries.     She is actively followed in fertility clinic and trying for 2nd pregnancy. Will take pregnancy test next week to know if pregnant. Today, denies sx of early pregnancy.     BP remains well controlled on labetalol. Safe to continue if pregnant.     FELICIA well controlled on CPAP. Wearing machine nightly with improvement in sleep and breathing.   Past Medical History:   Diagnosis Date    Anxiety     Ectopic pregnancy 02/06/2021    Female bladder prolapse     Ulcerative colitis 2023       Family History   Problem Relation Age of Onset    Hypertension Mother     Alcohol abuse Father         history of s/p liver transplant    Breast cancer Neg Hx     Colon cancer Neg Hx     Ovarian cancer Neg Hx        Social History     Socioeconomic History    Marital status:      Spouse name: Paolo Mena    Number of children: 1   Tobacco Use    Smoking status: Never     Passive exposure: Never    Smokeless tobacco: Never   Substance and Sexual Activity    Alcohol use: Yes     Comment: rarely    Drug use: Never    Sexual activity: Yes     Partners: Male     Birth control/protection: None     Comment:      Social Determinants of Health     Financial Resource Strain: Low Risk  (5/20/2020)    Overall Financial Resource Strain (CARDIA)     Difficulty of Paying Living Expenses: Not hard at all   Food Insecurity: No Food Insecurity (5/20/2020)    Hunger Vital Sign     Worried About Running Out of Food in the Last Year: Never true     Ran Out of Food in the Last  Year: Never true   Transportation Needs: No Transportation Needs (5/20/2020)    PRAPARE - Transportation     Lack of Transportation (Medical): No     Lack of Transportation (Non-Medical): No   Physical Activity: Insufficiently Active (5/20/2020)    Exercise Vital Sign     Days of Exercise per Week: 1 day     Minutes of Exercise per Session: 10 min   Stress: Stress Concern Present (5/20/2020)    [employer] - Occupational Stress Questionnaire     Feeling of Stress : To some extent   Social Connections: Unknown (5/20/2020)    Social Connection and Isolation Panel [NHANES]     Frequency of Communication with Friends and Family: More than three times a week     Frequency of Social Gatherings with Friends and Family: Once a week     Active Member of Clubs or Organizations: No     Attends Club or Organization Meetings: Never     Marital Status:        Review of Systems   Constitutional:  Negative for activity change, fatigue, fever and unexpected weight change.   HENT:  Negative for congestion, ear pain, hearing loss, rhinorrhea and sore throat.    Eyes:  Negative for redness and visual disturbance.   Respiratory:  Negative for cough, shortness of breath and wheezing.    Cardiovascular:  Negative for chest pain, palpitations and leg swelling.   Gastrointestinal:  Positive for blood in stool (improving, following with GI). Negative for abdominal pain, constipation, diarrhea, nausea and vomiting.   Genitourinary:  Negative for dysuria, frequency and urgency.   Musculoskeletal:  Positive for arthralgias (right heel pain). Negative for back pain, joint swelling and neck pain.   Skin:  Negative for color change, rash and wound.   Neurological:  Positive for numbness. Negative for dizziness, tremors, weakness, light-headedness and headaches.         Objective:      Physical Exam  Vitals reviewed.   Constitutional:       General: She is not in acute distress.     Appearance: She is  well-developed.   HENT:      Head: Normocephalic and atraumatic.      Right Ear: External ear normal.      Left Ear: External ear normal.      Nose: Nose normal.   Eyes:      General:         Right eye: No discharge.         Left eye: No discharge.      Conjunctiva/sclera: Conjunctivae normal.   Neck:      Thyroid: No thyromegaly.   Cardiovascular:      Rate and Rhythm: Normal rate and regular rhythm.   Pulmonary:      Effort: Pulmonary effort is normal. No respiratory distress.      Breath sounds: Normal breath sounds. No wheezing.   Skin:     General: Skin is warm and dry.   Neurological:      Mental Status: She is alert and oriented to person, place, and time.   Psychiatric:         Behavior: Behavior normal.         Thought Content: Thought content normal.         Assessment:       1. Cervical radiculopathy    2. Sleep apnea, unspecified type    3. Class 1 obesity due to excess calories with serious comorbidity and body mass index (BMI) of 33.0 to 33.9 in adult    4. Primary hypertension    5. Chronic heel pain, right        Plan:       1. Cervical radiculopathy  Chronic uncontrolled  Has failed conservative management with PT, NSAIDs  Neck pain and upper back pain with numbness into the arms and hands  MRI ordered  Pending pregnancy test proceed with EMG  Again, pending pregnancy test can discuss gabapentin, lyrica, etc  -     EMG W/ ULTRASOUND AND NERVE CONDUCTION TEST 2 Extremities; Future  -     MRI Cervical Spine Without Contrast; Future; Expected date: 04/12/2024    2. Sleep apnea, unspecified type  Chronic controlled  Cont CPAP nightly    3. Class 1 obesity due to excess calories with serious comorbidity and body mass index (BMI) of 33.0 to 33.9 in adult  Chronic stable  Diet, exercise, weightloss  -     CBC Auto Differential; Future; Expected date: 04/12/2024  -     Comprehensive Metabolic Panel; Future; Expected date: 04/12/2024  -     Lipid Panel; Future; Expected date: 04/12/2024  -     Hemoglobin  A1C; Future; Expected date: 04/12/2024  -     TSH; Future; Expected date: 04/12/2024  -     T4, Free; Future; Expected date: 04/12/2024    4. Primary hypertension  Chronic controlled  Cont labetalol same dose  -     CBC Auto Differential; Future; Expected date: 04/12/2024  -     Comprehensive Metabolic Panel; Future; Expected date: 04/12/2024  -     Lipid Panel; Future; Expected date: 04/12/2024  -     Hemoglobin A1C; Future; Expected date: 04/12/2024  -     TSH; Future; Expected date: 04/12/2024  -     T4, Free; Future; Expected date: 04/12/2024    5. Chronic heel pain, right  New problem  Pending pregnancy test will do xray next week  Suspect spur  Agree with change in shoes/inserts  -     X-Ray Foot Complete Right; Future; Expected date: 04/12/2024       RTC as scheduled and PRN pending above work up

## 2024-04-19 ENCOUNTER — PATIENT OUTREACH (OUTPATIENT)
Dept: INTERNAL MEDICINE | Facility: CLINIC | Age: 34
End: 2024-04-19
Payer: COMMERCIAL

## 2024-04-19 NOTE — TELEPHONE ENCOUNTER
Pt confirms receiving device.  She states that she will need to clear storage and update phone.  Advised that we can offer in office tech support on Thursdays at the Internal Medicine clinic in [place].  She voiced understanding.

## 2024-04-22 ENCOUNTER — HOSPITAL ENCOUNTER (OUTPATIENT)
Dept: RADIOLOGY | Facility: HOSPITAL | Age: 34
Discharge: HOME | End: 2024-04-22
Attending: INTERNAL MEDICINE
Payer: COMMERCIAL

## 2024-04-22 DIAGNOSIS — G89.29 CHRONIC HEEL PAIN, RIGHT: ICD-10-CM

## 2024-04-22 DIAGNOSIS — M79.671 CHRONIC HEEL PAIN, RIGHT: ICD-10-CM

## 2024-04-22 PROCEDURE — 73630 X-RAY EXAM OF FOOT: CPT | Mod: 26,RT,, | Performed by: RADIOLOGY

## 2024-04-22 PROCEDURE — 73630 X-RAY EXAM OF FOOT: CPT | Mod: TC,RT

## 2024-04-24 ENCOUNTER — HOSPITAL ENCOUNTER (OUTPATIENT)
Dept: RADIOLOGY | Facility: HOSPITAL | Age: 34
Discharge: HOME | End: 2024-04-24
Attending: INTERNAL MEDICINE
Payer: COMMERCIAL

## 2024-04-24 DIAGNOSIS — M54.12 CERVICAL RADICULOPATHY: ICD-10-CM

## 2024-04-24 PROCEDURE — 72141 MRI NECK SPINE W/O DYE: CPT | Mod: TC

## 2024-04-24 PROCEDURE — 72141 MRI NECK SPINE W/O DYE: CPT | Mod: 26,,, | Performed by: RADIOLOGY

## 2024-04-30 ENCOUNTER — PATIENT MESSAGE (OUTPATIENT)
Dept: OTHER | Facility: OTHER | Age: 34
End: 2024-04-30
Payer: COMMERCIAL

## 2024-04-30 ENCOUNTER — TELEPHONE (OUTPATIENT)
Dept: ENDOSCOPY | Facility: HOSPITAL | Age: 34
End: 2024-04-30
Payer: COMMERCIAL

## 2024-05-01 ENCOUNTER — ANESTHESIA EVENT (OUTPATIENT)
Dept: ENDOSCOPY | Facility: HOSPITAL | Age: 34
End: 2024-05-01

## 2024-05-02 ENCOUNTER — HOSPITAL ENCOUNTER (OUTPATIENT)
Facility: HOSPITAL | Age: 34
Discharge: HOME | End: 2024-05-02
Attending: INTERNAL MEDICINE | Admitting: INTERNAL MEDICINE
Payer: COMMERCIAL

## 2024-05-02 ENCOUNTER — ANESTHESIA (OUTPATIENT)
Dept: ENDOSCOPY | Facility: HOSPITAL | Age: 34
End: 2024-05-02
Payer: COMMERCIAL

## 2024-05-02 VITALS
HEART RATE: 70 BPM | SYSTOLIC BLOOD PRESSURE: 129 MMHG | DIASTOLIC BLOOD PRESSURE: 87 MMHG | HEIGHT: 61 IN | TEMPERATURE: 98 F | WEIGHT: 176 LBS | BODY MASS INDEX: 33.23 KG/M2 | RESPIRATION RATE: 16 BRPM | OXYGEN SATURATION: 99 %

## 2024-05-02 DIAGNOSIS — K51.90 ULCERATIVE COLITIS: ICD-10-CM

## 2024-05-02 LAB
B-HCG UR QL: NEGATIVE
CTP QC/QA: YES

## 2024-05-02 PROCEDURE — 81025 URINE PREGNANCY TEST: CPT | Performed by: INTERNAL MEDICINE

## 2024-05-02 PROCEDURE — 45380 COLONOSCOPY AND BIOPSY: CPT | Performed by: INTERNAL MEDICINE

## 2024-05-02 PROCEDURE — 88305 TISSUE EXAM BY PATHOLOGIST: CPT | Mod: 26,,, | Performed by: PATHOLOGY

## 2024-05-02 PROCEDURE — 63600175 PHARM REV CODE 636 W HCPCS: Performed by: NURSE ANESTHETIST, CERTIFIED REGISTERED

## 2024-05-02 PROCEDURE — 88305 TISSUE EXAM BY PATHOLOGIST: CPT | Performed by: PATHOLOGY

## 2024-05-02 PROCEDURE — 25000003 PHARM REV CODE 250: Performed by: NURSE ANESTHETIST, CERTIFIED REGISTERED

## 2024-05-02 PROCEDURE — E9220 PRA ENDO ANESTHESIA: HCPCS | Mod: ,,, | Performed by: NURSE ANESTHETIST, CERTIFIED REGISTERED

## 2024-05-02 PROCEDURE — 37000009 HC ANESTHESIA EA ADD 15 MINS: Performed by: INTERNAL MEDICINE

## 2024-05-02 PROCEDURE — 27201012 HC FORCEPS, HOT/COLD, DISP: Performed by: INTERNAL MEDICINE

## 2024-05-02 PROCEDURE — 45380 COLONOSCOPY AND BIOPSY: CPT | Mod: ,,, | Performed by: INTERNAL MEDICINE

## 2024-05-02 PROCEDURE — 37000008 HC ANESTHESIA 1ST 15 MINUTES: Performed by: INTERNAL MEDICINE

## 2024-05-02 RX ORDER — SODIUM CHLORIDE 9 MG/ML
INJECTION, SOLUTION INTRAVENOUS CONTINUOUS
Status: DISCONTINUED | OUTPATIENT
Start: 2024-05-02 | End: 2024-05-02 | Stop reason: HOSPADM

## 2024-05-02 RX ORDER — LIDOCAINE HYDROCHLORIDE 20 MG/ML
INJECTION INTRAVENOUS
Status: DISCONTINUED | OUTPATIENT
Start: 2024-05-02 | End: 2024-05-02

## 2024-05-02 RX ORDER — PHENYLEPHRINE HCL IN 0.9% NACL 1 MG/10 ML
SYRINGE (ML) INTRAVENOUS
Status: DISCONTINUED | OUTPATIENT
Start: 2024-05-02 | End: 2024-05-02

## 2024-05-02 RX ORDER — PROPOFOL 10 MG/ML
VIAL (ML) INTRAVENOUS
Status: DISCONTINUED | OUTPATIENT
Start: 2024-05-02 | End: 2024-05-02

## 2024-05-02 RX ADMIN — PROPOFOL 100 MG: 10 INJECTION, EMULSION INTRAVENOUS at 07:05

## 2024-05-02 RX ADMIN — LIDOCAINE HYDROCHLORIDE 75 MG: 20 INJECTION INTRAVENOUS at 07:05

## 2024-05-02 RX ADMIN — Medication 100 MCG: at 07:05

## 2024-05-02 RX ADMIN — SODIUM CHLORIDE: 0.9 INJECTION, SOLUTION INTRAVENOUS at 06:05

## 2024-05-02 RX ADMIN — PROPOFOL 150 MCG/KG/MIN: 10 INJECTION, EMULSION INTRAVENOUS at 07:05

## 2024-05-02 NOTE — PROVATION PATIENT INSTRUCTIONS
Discharge Summary/Instructions after an Endoscopic Procedure  Patient Name: Keisha Mena  Patient MRN: [id number]  Patient YOB: 1990  Thursday, May 2, 2024  Jaret Cross MD  Dear patient,  As a result of recent federal legislation (The Federal Cures Act), you may   receive lab or pathology results from your procedure in your [facility]   account before your physician is able to contact you. Your physician or   their representative will relay the results to you with their   recommendations at their soonest availability.  Thank you,  RESTRICTIONS:  During your procedure today, you received medications for sedation.  These   medications may affect your judgment, balance and coordination.  Therefore,   for 24 hours, you have the following restrictions:   - DO NOT drive a car, operate machinery, make legal/financial decisions,   sign important papers or drink alcohol.    ACTIVITY:  Today: no heavy lifting, straining or running due to procedural   sedation/anesthesia.  The following day: return to full activity including work.  DIET:  Eat and drink normally unless instructed otherwise.     TREATMENT FOR COMMON SIDE EFFECTS:  - Mild abdominal pain, nausea, belching, bloating or excessive gas:  rest,   eat lightly and use a heating pad.  - Sore Throat: treat with throat lozenges and/or gargle with warm salt   water.  - Because air was used during the procedure, expelling large amounts of air   from your rectum or belching is normal.  - If a bowel prep was taken, you may not have a bowel movement for 1-3 days.    This is normal.  SYMPTOMS TO WATCH FOR AND REPORT TO YOUR PHYSICIAN:  1. Abdominal pain or bloating, other than gas cramps.  2. Chest pain.  3. Back pain.  4. Signs of infection such as: chills or fever occurring within 24 hours   after the procedure.  5. Rectal bleeding, which would show as bright red, maroon, or black stools.   (A tablespoon of blood from the rectum is not serious, especially if    hemorrhoids are present.)  6. Vomiting.  7. Weakness or dizziness.  GO DIRECTLY TO THE NEAREST EMERGENCY ROOM IF YOU HAVE ANY OF THE FOLLOWING:      Difficulty breathing              Chills and/or fever over 101 F   Persistent vomiting and/or vomiting blood   Severe abdominal pain   Severe chest pain   Black, tarry stools   Bleeding- more than one tablespoon   Any other symptom or condition that you feel may need urgent attention  Your doctor recommends these additional instructions:  If any biopsies were taken, your doctors clinic will contact you in 1 to 2   weeks with any results.  - Discharge patient to home.   - Resume previous diet today.   - Continue present medications.   - Await pathology results.   - Repeat colonoscopy in 3 years to assess disease activity.   - Return to my office as previously scheduled.   - Patient has a contact number available for emergencies.  The signs and   symptoms of potential delayed complications were discussed with the   patient.  Return to normal activities tomorrow.  Written discharge   instructions were provided to the patient.  For questions, problems or results please call your physician - Jaret Cross MD at Work:  [phone].  [facility], EMERGENCY ROOM PHONE NUMBER: [phone]  IF A COMPLICATION OR EMERGENCY SITUATION ARISES AND YOU ARE UNABLE TO REACH   YOUR PHYSICIAN - GO DIRECTLY TO THE EMERGENCY ROOM.  Jaret Cross MD  5/2/2024 7:56:21 AM  This report has been verified and signed electronically.  Dear patient,  As a result of recent federal legislation (The Federal Cures Act), you may   receive lab or pathology results from your procedure in your [facility]   account before your physician is able to contact you. Your physician or   their representative will relay the results to you with their   recommendations at their soonest availability.  Thank you,  PROVATION

## 2024-05-02 NOTE — TRANSFER OF CARE
"Anesthesia Transfer of Care Note    Patient: Keisha Mena    Procedure(s) Performed: Procedure(s) (LRB):  COLONOSCOPY (N/A)    Patient location: [facility]    Anesthesia Type: general    Transport from OR: Transported from OR on room air with adequate spontaneous ventilation    Post pain: adequate analgesia    Post assessment: no apparent anesthetic complications    Post vital signs: stable    Level of consciousness: awake, alert and oriented    Nausea/Vomiting: no nausea/vomiting    Complications: none    Transfer of care protocol was followed    Last vitals: Visit Vitals  /61 (BP Location: Left arm)   Pulse 80   Temp 36.5 °C (97.7 °F) (Temporal)   Resp 16   Ht 5' 1" (1.549 m)   Wt 79.8 kg (176 lb)   SpO2 99%   Breastfeeding No   BMI 33.25 kg/m²     "

## 2024-05-02 NOTE — H&P
Short Stay Endoscopy History and Physical    PCP - Lisa Fung MD    Procedure - Colonoscopy  ASA - 2  Mallampati - per anesthesia  History of Anesthesia problems - no  Family history Anesthesia problems -  no     HPI:  This is a 33 y.o. female here for evaluation of :     Average Risk Screening: No  High risk screening: No  History of polyps: No  Anemia: No  Blood in stools: No  Diarrhea: No  Abdominal Pain: No  Other: UC    Review of Systems:  CONSTITUTIONAL: Denies weight change,  fatigue, fevers, chills, night sweats.  CARDIOVASCULAR: Denies chest pain, shortness of breath, orthopnea and edema.  RESPIRATORY: Denies cough, hemoptysis, dyspnea, and wheezing.  GI: See HPI.    Medical History:  Past Medical History:   Diagnosis Date    Anxiety     Ectopic pregnancy 02/06/2021    Female bladder prolapse     Ulcerative colitis 2023       Surgical History:   Past Surgical History:   Procedure Laterality Date    COLONOSCOPY N/A 8/29/2023    Procedure: COLONOSCOPY;  Surgeon: González Fernandez MD;  Location: [facility] ([address] [place] [address]);  Service: Colon and Rectal;  Laterality: N/A;  Referred by: Dr. Wang  Prep: PEG  Route instructions sent: portal  Other concerns: FYI Pt seen by Dr. Wang today with notes that state: Plan  -colonoscopy within a week  -continue taking cipro/flagyl  - concern for ulcerative proctitis  SW    DIAGNOSTIC LAPAROSCOPY N/A 2/7/2021    Procedure: LAPAROSCOPY, DIAGNOSTIC;  Surgeon: Tea Schneider MD;  Location: [facility];  Service: OB/GYN;  Laterality: N/A;    LAPAROSCOPIC SALPINGECTOMY Left 2/7/2021    Procedure: SALPINGECTOMY, LAPAROSCOPIC;  Surgeon: Tea Schneider MD;  Location: [facility];  Service: OB/GYN;  Laterality: Left;       Family History:   Family History   Problem Relation Name Age of Onset    Hypertension Mother      Alcohol abuse Father          history of s/p liver transplant    Liver cancer Father      Breast cancer Neg Hx      Colon cancer Neg Hx      Ovarian cancer  Neg Hx         Social History:   Social History     Tobacco Use    Smoking status: Never     Passive exposure: Never    Smokeless tobacco: Never   Substance Use Topics    Alcohol use: Yes     Comment: rarely    Drug use: Never       Allergies: Reviewed.    Medications:  No current facility-administered medications on file prior to encounter.     Current Outpatient Medications on File Prior to Encounter   Medication Sig Dispense Refill    labetaloL (NORMODYNE) 100 MG tablet TAKE 1 TABLET BY MOUTH TWICE A  tablet 3    mesalamine (LIALDA) 1.2 gram TbEC Take 4 tablets (4.8 g total) by mouth daily with breakfast. 120 tablet 5    omeprazole (PRILOSEC OTC) 20 MG tablet Take 20 mg by mouth every morning.      PNV Comb12/Iron Cb/FA1/DSS/DHA (PRENATAL 12-IRON-FA1-DSS-DHA ORAL) Take by mouth.      acetaminophen (TYLENOL) 500 MG tablet Take 500 mg by mouth every 6 (six) hours as needed for Pain.      hydrOXYzine HCL (ATARAX) 25 MG tablet Take 25 mg by mouth as needed for Anxiety.      Lactobacillus rhamnosus GG (CULTURELLE) 10 billion cell capsule Take 1 capsule by mouth once daily.      pramoxine-hydrocortisone (PROCTOCREAM-HC) 1-1 % rectal cream Place rectally 3 (three) times daily. (Patient not taking: Reported on 9/19/2023) 30 g 1       Physical Exam:  Vital Signs:   Vitals:    05/02/24 0709   BP: (!) 144/92   Pulse: 82   Resp: 18   Temp: 97.9 °F (36.6 °C)     General Appearance: Well appearing in no acute distress  ENT: OP clear  Chest: CTA B  CV: RRR, no m/r/g  Abd: s/nt/nd/nabs  Ext: no edema    Labs:  Reviewed    IMPRESSION:    UC    Plan:  I have explained the risks and benefits of colonoscopy to the patient including but not limited to bleeding, perforation, infection, and death. The patient wishes to proceed with colonoscopy.

## 2024-05-02 NOTE — ANESTHESIA POSTPROCEDURE EVALUATION
Anesthesia Post Evaluation    Patient: Keisha Mena    Procedure(s) Performed: Procedure(s) (LRB):  COLONOSCOPY (N/A)    Final Anesthesia Type: general      Patient location during evaluation: GI PACU  Patient participation: Yes- Able to Participate  Level of consciousness: awake  Post-procedure vital signs: reviewed and stable  Pain management: adequate  Airway patency: patent    PONV status at discharge: No PONV  Anesthetic complications: no      Cardiovascular status: blood pressure returned to baseline  Respiratory status: unassisted, spontaneous ventilation and room air                Vitals Value Taken Time   /87 05/02/24 0829   Temp 36.5 °C (97.7 °F) 05/02/24 0800   Pulse 70 05/02/24 0829   Resp 16 05/02/24 0829   SpO2 99 % 05/02/24 0829         No case tracking events are documented in the log.      Pain/Rk Score: Rk Score: 10 (5/2/2024  8:15 AM)

## 2024-05-02 NOTE — ANESTHESIA PREPROCEDURE EVALUATION
05/02/2024  Keisha Mena is a 33 y.o., female.      Pre-op Assessment    I have reviewed the Patient Summary Reports.     I have reviewed the Nursing Notes.    I have reviewed the Medications.     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     Cardiovascular:   Functional Capacity good / => 4 METS                         Hepatic/GI:  Bowel Prep.                Psych:     Sleep Disorder.       Sleep Disorder.    Physical Exam  General: Well nourished, Alert and Cooperative    Airway:  Mallampati: II / I  Mouth Opening: Normal  TM Distance: Normal  Neck ROM: Normal ROM    Dental:  Intact    Chest/Lungs:  Clear to auscultation    Anesthesia Plan  Type of Anesthesia, risks & benefits discussed:    Anesthesia Type: Gen Natural Airway  Intra-op Monitoring Plan: Standard ASA Monitors  Post Op Pain Control Plan: multimodal analgesia  Induction:  IV  Informed Consent: Informed consent signed with the Patient and all parties understand the risks and agree with anesthesia plan.  All questions answered.   ASA Score: 2  Day of Surgery Review of History & Physical: H&P Update referred to the surgeon/provider.    Ready For Surgery From Anesthesia Perspective.   .

## 2024-05-03 LAB
FINAL PATHOLOGIC DIAGNOSIS: NORMAL
GROSS: NORMAL
Lab: NORMAL

## 2024-05-27 DIAGNOSIS — I10 HYPERTENSION, UNSPECIFIED TYPE: ICD-10-CM

## 2024-05-27 RX ORDER — LABETALOL 100 MG/1
TABLET, FILM COATED ORAL
Qty: 180 TABLET | Refills: 3 | Status: SHIPPED | OUTPATIENT
Start: 2024-05-27

## 2024-05-27 NOTE — TELEPHONE ENCOUNTER
No care due was identified.  Health [facility] Embedded Care Due Messages. Reference number: [id number].   5/27/2024 1:00:31 AM CDT

## 2024-05-27 NOTE — TELEPHONE ENCOUNTER
Refill Decision Note   Keisha Trina  is requesting a refill authorization.  Brief Assessment and Rationale for Refill:  Approve     Medication Therapy Plan:         Comments:     Note composed:1:16 PM 05/27/2024

## 2024-06-24 ENCOUNTER — OFFICE VISIT (OUTPATIENT)
Dept: INTERNAL MEDICINE | Facility: CLINIC | Age: 34
End: 2024-06-24
Payer: COMMERCIAL

## 2024-06-24 VITALS
HEIGHT: 61 IN | BODY MASS INDEX: 33.05 KG/M2 | WEIGHT: 175.06 LBS | DIASTOLIC BLOOD PRESSURE: 70 MMHG | OXYGEN SATURATION: 98 % | HEART RATE: 73 BPM | SYSTOLIC BLOOD PRESSURE: 116 MMHG | RESPIRATION RATE: 20 BRPM

## 2024-06-24 DIAGNOSIS — G62.9 NEUROPATHY: ICD-10-CM

## 2024-06-24 DIAGNOSIS — K21.9 GASTROESOPHAGEAL REFLUX DISEASE WITHOUT ESOPHAGITIS: Primary | ICD-10-CM

## 2024-06-24 PROCEDURE — 99214 OFFICE O/P EST MOD 30 MIN: CPT | Mod: S$GLB,,, | Performed by: INTERNAL MEDICINE

## 2024-06-24 PROCEDURE — 3074F SYST BP LT 130 MM HG: CPT | Mod: CPTII,S$GLB,, | Performed by: INTERNAL MEDICINE

## 2024-06-24 PROCEDURE — 1160F RVW MEDS BY RX/DR IN RCRD: CPT | Mod: CPTII,S$GLB,, | Performed by: INTERNAL MEDICINE

## 2024-06-24 PROCEDURE — 3044F HG A1C LEVEL LT 7.0%: CPT | Mod: CPTII,S$GLB,, | Performed by: INTERNAL MEDICINE

## 2024-06-24 PROCEDURE — 3078F DIAST BP <80 MM HG: CPT | Mod: CPTII,S$GLB,, | Performed by: INTERNAL MEDICINE

## 2024-06-24 PROCEDURE — 3008F BODY MASS INDEX DOCD: CPT | Mod: CPTII,S$GLB,, | Performed by: INTERNAL MEDICINE

## 2024-06-24 PROCEDURE — 99999 PR PBB SHADOW E&M-EST. PATIENT-LVL IV: CPT | Mod: PBBFAC,,, | Performed by: INTERNAL MEDICINE

## 2024-06-24 PROCEDURE — 1159F MED LIST DOCD IN RCRD: CPT | Mod: CPTII,S$GLB,, | Performed by: INTERNAL MEDICINE

## 2024-06-24 PROCEDURE — G2211 COMPLEX E/M VISIT ADD ON: HCPCS | Mod: S$GLB,,, | Performed by: INTERNAL MEDICINE

## 2024-06-24 RX ORDER — OMEPRAZOLE 40 MG/1
40 CAPSULE, DELAYED RELEASE ORAL
Qty: 180 CAPSULE | Refills: 3 | Status: SHIPPED | OUTPATIENT
Start: 2024-06-24 | End: 2025-06-24

## 2024-06-24 NOTE — PROGRESS NOTES
"Subjective:       Patient ID: Keisha Mena is a 33 y.o. female.    Chief Complaint: Sore Throat      HPI:  Patient is known to me and presents with sore throat. Sx started 2-3 days ago. She feels congestion/drip down her throat. Worse with her CPAP use--making her gag at night. Denies nasal congestion. Eyes feel "warm". No redness. No fevers. Mild nausea. "My stomach fel sourish". She describes waterbrash--" funky taste in my mouth". Tried OTC TUMs. Taking OTC omeprazole as well.     Past Medical History:   Diagnosis Date    Anxiety     Ectopic pregnancy 02/06/2021    Female bladder prolapse     Ulcerative colitis 2023       Family History   Problem Relation Name Age of Onset    Hypertension Mother      Alcohol abuse Father          history of s/p liver transplant    Liver cancer Father      Breast cancer Neg Hx      Colon cancer Neg Hx      Ovarian cancer Neg Hx         Social History     Socioeconomic History    Marital status:      Spouse name: Paolo Mena    Number of children: 1   Tobacco Use    Smoking status: Never     Passive exposure: Never    Smokeless tobacco: Never   Substance and Sexual Activity    Alcohol use: Yes     Comment: rarely    Drug use: Never    Sexual activity: Yes     Partners: Male     Birth control/protection: None     Comment:      Social Determinants of Health     Financial Resource Strain: Low Risk  (5/20/2020)    Overall Financial Resource Strain (CARDIA)     Difficulty of Paying Living Expenses: Not hard at all   Food Insecurity: No Food Insecurity (5/20/2020)    Hunger Vital Sign     Worried About Running Out of Food in the Last Year: Never true     Ran Out of Food in the Last Year: Never true   Transportation Needs: No Transportation Needs (5/20/2020)    PRAPARE - Transportation     Lack of Transportation (Medical): No     Lack of Transportation (Non-Medical): No   Physical Activity: Insufficiently Active (5/20/2020)    Exercise Vital Sign     Days of Exercise per Week: " 1 day     Minutes of Exercise per Session: 10 min   Stress: Stress Concern Present (5/20/2020)    [employer] - Occupational Stress Questionnaire     Feeling of Stress : To some extent       Review of Systems   Constitutional:  Negative for activity change, fatigue, fever and unexpected weight change.   HENT:  Positive for sore throat. Negative for congestion, ear pain, hearing loss and rhinorrhea.    Eyes:  Negative for redness and visual disturbance.   Respiratory:  Negative for cough, shortness of breath and wheezing.    Cardiovascular:  Negative for chest pain, palpitations and leg swelling.   Gastrointestinal:  Positive for abdominal pain (epigastric, intermittent) and nausea. Negative for constipation, diarrhea and vomiting.   Genitourinary:  Negative for dysuria, frequency and urgency.   Skin:  Negative for color change, rash and wound.   Neurological:  Positive for numbness (b/l arms hands at night, unchanged from prior). Negative for dizziness, tremors, weakness, light-headedness and headaches.         Objective:      Physical Exam  Vitals reviewed.   Constitutional:       General: She is not in acute distress.     Appearance: She is well-developed.   HENT:      Head: Normocephalic and atraumatic.      Right Ear: Tympanic membrane, ear canal and external ear normal.      Left Ear: Tympanic membrane, ear canal and external ear normal.      Nose: Nose normal.      Mouth/Throat:      Mouth: Mucous membranes are moist.      Pharynx: No oropharyngeal exudate or posterior oropharyngeal erythema.   Neck:      Thyroid: No thyromegaly.   Cardiovascular:      Rate and Rhythm: Normal rate and regular rhythm.   Pulmonary:      Effort: Pulmonary effort is normal.      Breath sounds: Normal breath sounds.   Skin:     General: Skin is warm and dry.   Neurological:      Mental Status: She is alert and oriented to person, place, and time.   Psychiatric:         Behavior: Behavior normal.          Thought Content: Thought content normal.         Assessment:       1. Gastroesophageal reflux disease without esophagitis    2. Neuropathy        Plan:       1. Gastroesophageal reflux disease without esophagitis  Chronic uncontrolled  Sore throat does not appear to be infectious--concern for reflux  Increase PPI to 40mg BID for 2 weeks then decrease down to 40mg daily   Diet modifications  Elevate head of bed  -     omeprazole (PRILOSEC) 40 MG capsule; Take 1 capsule (40 mg total) by mouth 2 (two) times daily before meals.  Dispense: 180 capsule; Refill: 3    2. Neuropathy  Chronic uncontrolled  MRI C-spine reviewed  Referred back for EMG, was never scheduled from last visit  -     EMG W/ ULTRASOUND AND NERVE CONDUCTION TEST 2 Extremities; Future

## 2024-07-03 ENCOUNTER — TELEPHONE (OUTPATIENT)
Dept: NEUROLOGY | Facility: CLINIC | Age: 34
End: 2024-07-03
Payer: COMMERCIAL

## 2024-07-03 NOTE — TELEPHONE ENCOUNTER
RE: EMG  Received: 1 week ago  Jose J Contreras MD Sanchez, Jennifer, MA  Approved  [facility]          Previous Messages       ----- Message -----  From: Adrianne White MA  Sent: 6/24/2024   2:01 PM CDT  To: Jose J Contreras MD  Subject: EMG                                              Ok for scheduling?

## 2024-08-05 ENCOUNTER — OFFICE VISIT (OUTPATIENT)
Dept: GASTROENTEROLOGY | Facility: CLINIC | Age: 34
End: 2024-08-05
Payer: COMMERCIAL

## 2024-08-05 VITALS
TEMPERATURE: 98 F | BODY MASS INDEX: 33.42 KG/M2 | DIASTOLIC BLOOD PRESSURE: 90 MMHG | HEIGHT: 61 IN | WEIGHT: 177 LBS | OXYGEN SATURATION: 98 % | HEART RATE: 68 BPM | SYSTOLIC BLOOD PRESSURE: 128 MMHG

## 2024-08-05 DIAGNOSIS — E66.9 OBESITY (BMI 30.0-34.9): ICD-10-CM

## 2024-08-05 DIAGNOSIS — R12 HEARTBURN: ICD-10-CM

## 2024-08-05 DIAGNOSIS — K51.90 ULCERATIVE COLITIS WITHOUT COMPLICATIONS, UNSPECIFIED LOCATION: Primary | ICD-10-CM

## 2024-08-05 DIAGNOSIS — K21.9 GASTROESOPHAGEAL REFLUX DISEASE WITHOUT ESOPHAGITIS: ICD-10-CM

## 2024-08-05 PROCEDURE — 3008F BODY MASS INDEX DOCD: CPT | Mod: CPTII,,, | Performed by: INTERNAL MEDICINE

## 2024-08-05 PROCEDURE — G2211 COMPLEX E/M VISIT ADD ON: HCPCS | Mod: ,,, | Performed by: INTERNAL MEDICINE

## 2024-08-05 PROCEDURE — 99214 OFFICE O/P EST MOD 30 MIN: CPT | Mod: ,,, | Performed by: INTERNAL MEDICINE

## 2024-08-05 PROCEDURE — 1160F RVW MEDS BY RX/DR IN RCRD: CPT | Mod: CPTII,,, | Performed by: INTERNAL MEDICINE

## 2024-08-05 PROCEDURE — 1159F MED LIST DOCD IN RCRD: CPT | Mod: CPTII,,, | Performed by: INTERNAL MEDICINE

## 2024-08-05 PROCEDURE — 3080F DIAST BP >= 90 MM HG: CPT | Mod: CPTII,,, | Performed by: INTERNAL MEDICINE

## 2024-08-05 PROCEDURE — 3044F HG A1C LEVEL LT 7.0%: CPT | Mod: CPTII,,, | Performed by: INTERNAL MEDICINE

## 2024-08-05 PROCEDURE — 3074F SYST BP LT 130 MM HG: CPT | Mod: CPTII,,, | Performed by: INTERNAL MEDICINE

## 2024-08-05 RX ORDER — OMEPRAZOLE 20 MG/1
20 CAPSULE, DELAYED RELEASE ORAL EVERY MORNING
Qty: 90 CAPSULE | Refills: 3 | Status: SHIPPED | OUTPATIENT
Start: 2024-08-05 | End: 2025-08-05

## 2024-08-05 RX ORDER — OMEPRAZOLE 20 MG/1
20 CAPSULE, DELAYED RELEASE ORAL EVERY MORNING
Qty: 90 CAPSULE | Refills: 3 | Status: SHIPPED | OUTPATIENT
Start: 2024-08-05 | End: 2024-08-05

## 2024-08-05 RX ORDER — MESALAMINE 1.2 G/1
4.8 TABLET, DELAYED RELEASE ORAL
Qty: 120 TABLET | Refills: 5 | Status: SHIPPED | OUTPATIENT
Start: 2024-08-05

## 2024-08-20 ENCOUNTER — PROCEDURE VISIT (OUTPATIENT)
Dept: NEUROLOGY | Facility: CLINIC | Age: 34
End: 2024-08-20
Payer: COMMERCIAL

## 2024-08-20 DIAGNOSIS — R20.2 NUMBNESS AND TINGLING IN BOTH HANDS: Primary | ICD-10-CM

## 2024-08-20 DIAGNOSIS — R20.0 NUMBNESS AND TINGLING IN BOTH HANDS: Primary | ICD-10-CM

## 2024-08-20 DIAGNOSIS — G62.9 NEUROPATHY: ICD-10-CM

## 2024-08-20 PROCEDURE — 95886 MUSC TEST DONE W/N TEST COMP: CPT | Mod: S$GLB,,, | Performed by: PSYCHIATRY & NEUROLOGY

## 2024-08-20 PROCEDURE — 95911 NRV CNDJ TEST 9-10 STUDIES: CPT | Mod: S$GLB,,, | Performed by: PSYCHIATRY & NEUROLOGY

## 2024-08-20 NOTE — PROCEDURES
REPORT OF EMG and NERVE CONDUCTION STUDY    Name: Keisha Mena  Date of Study:  8/20/2024  Referring Physician:  Dr. Lisa Fung  Test Performed by:  MD Diane  Full Values Attached  Informed Consent Scanned.   No anesthesia used.   Amount of Blood Loss: none. The patient tolerated this procedure well.       Informed consent was obtained prior to performing this study. Two patient identifiers were confirmed with the patient prior to performing this study. A time out to determine correct patient and and agreement on procedure performed was conducted prior to the concentric needle examination.    Reason for the study:  numb hands, cervical radicular pain      Findings:   Nerve conduction studies of the bilateral median (motor and sensory)nerves, bilateral ulnar (motor and sensory) nerves, and bilateral radial sensory nerves were performed.   Amplitudes, distal latencies, conduction velocities, and F-waves were normal.  EMG of selected muscles of the bilateral armswere performed as indicated on the attached sheets.Insertional activity was normal without fasciculation or fibrillation, normal sized and phasia of motor units.      Impression:  Normal EMG/NCS of the arms    It is important to note that EMG/NCS will NOT be revealing in disorders of the central nervous system (as in cases of demyelination)  and in most cases of Thoracic outlet syndrome.       Jose J Contreras M.D. [facility].     A copy of this EMG/NCS report will be sent to Dr. Fung. Thanks for sending this patient for EMG/NCS. The patient plans to await further recommendations from you regarding the above findings.

## 2024-09-18 ENCOUNTER — OFFICE VISIT (OUTPATIENT)
Dept: OBSTETRICS AND GYNECOLOGY | Facility: CLINIC | Age: 34
End: 2024-09-18
Payer: COMMERCIAL

## 2024-09-18 ENCOUNTER — OFFICE VISIT (OUTPATIENT)
Dept: DERMATOLOGY | Facility: CLINIC | Age: 34
End: 2024-09-18
Payer: COMMERCIAL

## 2024-09-18 VITALS
BODY MASS INDEX: 33.87 KG/M2 | SYSTOLIC BLOOD PRESSURE: 120 MMHG | DIASTOLIC BLOOD PRESSURE: 82 MMHG | HEART RATE: 75 BPM | WEIGHT: 179.38 LBS | HEIGHT: 61 IN

## 2024-09-18 DIAGNOSIS — Z01.419 WELL WOMAN EXAM WITH ROUTINE GYNECOLOGICAL EXAM: Primary | ICD-10-CM

## 2024-09-18 DIAGNOSIS — L70.0 ACNE VULGARIS: Primary | ICD-10-CM

## 2024-09-18 PROCEDURE — 1159F MED LIST DOCD IN RCRD: CPT | Mod: CPTII,S$GLB,, | Performed by: STUDENT IN AN ORGANIZED HEALTH CARE EDUCATION/TRAINING PROGRAM

## 2024-09-18 PROCEDURE — 99999 PR PBB SHADOW E&M-EST. PATIENT-LVL III: CPT | Mod: PBBFAC,,, | Performed by: STUDENT IN AN ORGANIZED HEALTH CARE EDUCATION/TRAINING PROGRAM

## 2024-09-18 PROCEDURE — 3044F HG A1C LEVEL LT 7.0%: CPT | Mod: CPTII,S$GLB,, | Performed by: STUDENT IN AN ORGANIZED HEALTH CARE EDUCATION/TRAINING PROGRAM

## 2024-09-18 PROCEDURE — 99395 PREV VISIT EST AGE 18-39: CPT | Mod: S$GLB,,, | Performed by: OBSTETRICS & GYNECOLOGY

## 2024-09-18 PROCEDURE — 99999 PR PBB SHADOW E&M-EST. PATIENT-LVL III: CPT | Mod: PBBFAC,,, | Performed by: OBSTETRICS & GYNECOLOGY

## 2024-09-18 PROCEDURE — 3079F DIAST BP 80-89 MM HG: CPT | Mod: CPTII,S$GLB,, | Performed by: OBSTETRICS & GYNECOLOGY

## 2024-09-18 PROCEDURE — 1159F MED LIST DOCD IN RCRD: CPT | Mod: CPTII,S$GLB,, | Performed by: OBSTETRICS & GYNECOLOGY

## 2024-09-18 PROCEDURE — 3074F SYST BP LT 130 MM HG: CPT | Mod: CPTII,S$GLB,, | Performed by: OBSTETRICS & GYNECOLOGY

## 2024-09-18 PROCEDURE — 99204 OFFICE O/P NEW MOD 45 MIN: CPT | Mod: S$GLB,,, | Performed by: STUDENT IN AN ORGANIZED HEALTH CARE EDUCATION/TRAINING PROGRAM

## 2024-09-18 PROCEDURE — 3044F HG A1C LEVEL LT 7.0%: CPT | Mod: CPTII,S$GLB,, | Performed by: OBSTETRICS & GYNECOLOGY

## 2024-09-18 PROCEDURE — 1160F RVW MEDS BY RX/DR IN RCRD: CPT | Mod: CPTII,S$GLB,, | Performed by: STUDENT IN AN ORGANIZED HEALTH CARE EDUCATION/TRAINING PROGRAM

## 2024-09-18 PROCEDURE — 3008F BODY MASS INDEX DOCD: CPT | Mod: CPTII,S$GLB,, | Performed by: OBSTETRICS & GYNECOLOGY

## 2024-09-18 RX ORDER — CEPHALEXIN 500 MG/1
CAPSULE ORAL
Qty: 21 CAPSULE | Refills: 0 | Status: SHIPPED | OUTPATIENT
Start: 2024-09-18

## 2024-09-18 RX ORDER — AZELAIC ACID 0.15 G/G
GEL TOPICAL
Qty: 50 G | Refills: 5 | Status: SHIPPED | OUTPATIENT
Start: 2024-09-18

## 2024-09-18 NOTE — PATIENT INSTRUCTIONS
Benzoyl peroxide 4% daily wash in morning - Panoxyl or cerave  At night CeraVe gentle cleanser  Azelaic acid gel twice daily

## 2024-09-18 NOTE — PROGRESS NOTES
Subjective:    Patient ID: Keisha Mena is a 33 y.o. y.o. female.     Chief Complaint: Annual Well Woman Exam     History of Present Illness:  Keisha presents today for Annual Well Woman exam. She describes her menses as regular every month without intermenstrual spotting.She denies pelvic pain.  She denies breast tenderness, masses, nipple discharge. She denies GYN complaints. She admits to leaking with urination.  She denies bloating, early satiety, or weight changes. She is sexually active. Contraception is by no method.      Menstrual History:   Patient's last menstrual period was 2024..     OB History          2    Para   1    Term   1       0    AB   1    Living   1         SAB   0    IAB   0    Ectopic   1    Multiple   0    Live Births   1                 The following portions of the patient's history were reviewed and updated as appropriate: allergies, current medications, past family history, past medical history, past social history, past surgical history, and problem list.    ROS:   Review of Systems   Constitutional:  Positive for unexpected weight change. Negative for chills, diaphoresis, fatigue and fever.   HENT:  Negative for congestion, hearing loss, postnasal drip, rhinorrhea, sinus pressure, sinus pain, sore throat and tinnitus.    Eyes:  Negative for pain, discharge and visual disturbance.   Respiratory:  Positive for apnea. Negative for cough, shortness of breath and wheezing.    Cardiovascular:  Negative for chest pain, palpitations and leg swelling.   Gastrointestinal:  Negative for abdominal pain, constipation, diarrhea, nausea and vomiting.   Endocrine: Negative for cold intolerance, heat intolerance, polydipsia, polyphagia and polyuria.   Genitourinary:  Positive for enuresis. Negative for difficulty urinating, dyspareunia, dysuria, flank pain, frequency, genital sores, hematuria, menstrual problem, pelvic pain, urgency, vaginal bleeding, vaginal discharge and vaginal  pain.   Musculoskeletal:  Negative for arthralgias, back pain, joint swelling, myalgias, neck pain and neck stiffness.   Skin:  Negative for color change, pallor and rash.   Allergic/Immunologic: Negative for environmental allergies, food allergies and immunocompromised state.   Neurological:  Negative for dizziness, weakness, light-headedness, numbness and headaches.   Hematological:  Negative for adenopathy. Does not bruise/bleed easily.   Psychiatric/Behavioral:  Negative for agitation and confusion. The patient is not nervous/anxious.          Objective:    Vital Signs:  Vitals:    09/18/24 1203   BP: 120/82   Pulse: 75       Physical Exam:   Examination performed with Chaperone present  General:  alert, cooperative, no distress   Skin:  Skin color, texture, turgor normal. No rashes or lesions   HEENT:  extra ocular movement intact, sclera clear, anicteric   Neck: supple, trachea midline, no adenopathy or thyromegally   Respiratory:  Normal effort   Breasts:  no discharge, erythema, tenderness, or palpable masses; no axillary lymphadenopathy   Abdomen:  soft, nontender, no palpable masses   Pelvis: External genitalia: normal general appearance  Urinary system: urethral meatus normal, bladder nontender  Vaginal: normal mucosa without prolapse or lesions  Cervix: normal appearance  Uterus: normal size, shape, position  Adnexa: normal size, nontender bilaterally   Extremities: Normal ROM; no edema, no cyanosis   Neurologial: Normal strength and tone. No focal numbness or weakness.   Psychiatric: normal mood, speech, dress, and thought processes         Assessment:       Healthy female exam.     1. Well woman exam with routine gynecological exam          Plan:      Well woman exam with routine gynecological exam        Pap 2023 NEM, HPV negative    COUNSELING:  Keisha was counseled on A.C.O.G. Pap guidelines and recommendations for yearly pelvic exams in addition to recommendations for monthly self breast exams; to  see her PCP for other health maintenance.

## 2024-09-18 NOTE — PROGRESS NOTES
Subjective:      Patient ID:  Keisha Mena is a 33 y.o. female who presents for No chief complaint on file.    33 y.o. female presents today for acne.    New patient    1. Acne  Location: face   Duration: recently worsened within the past month   Current tx: cerave wash   Prior tx: accutane in highschool, does not want to go back on this  Methods of contraception:  Flares around menstrual cycle: yes- but never this bad   Pregnant or planning pregnancy: yes trying to currently get pregnant and may start IVF in January    Pt has ulcerative collitis  Wears CPAP @ night          Review of Systems    Objective:   Physical Exam     Diagram Legend     Erythematous scaling macule/papule c/w actinic keratosis       Vascular papule c/w angioma      Pigmented verrucoid papule/plaque c/w seborrheic keratosis      Yellow umbilicated papule c/w sebaceous hyperplasia      Irregularly shaped tan macule c/w lentigo     1-2 mm smooth white papules consistent with Milia      Movable subcutaneous cyst with punctum c/w epidermal inclusion cyst      Subcutaneous movable cyst c/w pilar cyst      Firm pink to brown papule c/w dermatofibroma      Pedunculated fleshy papule(s) c/w skin tag(s)      Evenly pigmented macule c/w junctional nevus     Mildly variegated pigmented, slightly irregular-bordered macule c/w mildly atypical nevus      Flesh colored to evenly pigmented papule c/w intradermal nevus       Pink pearly papule/plaque c/w basal cell carcinoma      Erythematous hyperkeratotic cursted plaque c/w SCC      Surgical scar with no sign of skin cancer recurrence      Open and closed comedones      Inflammatory papules and pustules      Verrucoid papule consistent consistent with wart     Erythematous eczematous patches and plaques     Dystrophic onycholytic nail with subungual debris c/w onychomycosis     Umbilicated papule    Erythematous-base heme-crusted tan verrucoid plaque consistent with inflamed seborrheic keratosis      Erythematous Silvery Scaling Plaque c/w Psoriasis     See annotation      Assessment / Plan:        Acne vulgaris  Pt with moderate to severe nodulocystic acne flare and comedonal acne on face  Discussed treatment options limited in pregnancy, pt is actively trying to conceive  Ideally would treat her with combination of spironolactone, tretinoin (pt does not want accutane again) and doxycycline for acute flare but can't use these and discussed this. If her pregnancy plans change and she wants doxycycline, she will let me know  Can't do ILK- lesions too numerous- offered to any particularly painful ones  Start cephalexin for 1 week for acute flare  -     cephALEXin (KEFLEX) 500 MG capsule; Take 1 capsule by mouth three times a day for 1 week  Dispense: 21 capsule; Refill: 0  Start azelaic acid 15% gel BID  Start benzoyl peroxide 5% wash OTC daily    RTC after pregnancy for acne treatments

## 2024-10-07 ENCOUNTER — PATIENT MESSAGE (OUTPATIENT)
Dept: OBSTETRICS AND GYNECOLOGY | Facility: CLINIC | Age: 34
End: 2024-10-07
Payer: COMMERCIAL

## 2024-10-07 DIAGNOSIS — N94.10 DYSPAREUNIA IN FEMALE: Primary | ICD-10-CM

## 2024-10-09 ENCOUNTER — OFFICE VISIT (OUTPATIENT)
Dept: INTERNAL MEDICINE | Facility: CLINIC | Age: 34
End: 2024-10-09
Payer: COMMERCIAL

## 2024-10-09 VITALS
HEIGHT: 61 IN | HEART RATE: 80 BPM | BODY MASS INDEX: 32.34 KG/M2 | RESPIRATION RATE: 20 BRPM | SYSTOLIC BLOOD PRESSURE: 118 MMHG | DIASTOLIC BLOOD PRESSURE: 84 MMHG | OXYGEN SATURATION: 99 % | WEIGHT: 171.31 LBS

## 2024-10-09 DIAGNOSIS — M25.551 ACUTE RIGHT HIP PAIN: ICD-10-CM

## 2024-10-09 DIAGNOSIS — G54.0 THORACIC OUTLET SYNDROME: Primary | ICD-10-CM

## 2024-10-09 PROCEDURE — 3074F SYST BP LT 130 MM HG: CPT | Mod: CPTII,S$GLB,, | Performed by: INTERNAL MEDICINE

## 2024-10-09 PROCEDURE — 1160F RVW MEDS BY RX/DR IN RCRD: CPT | Mod: CPTII,S$GLB,, | Performed by: INTERNAL MEDICINE

## 2024-10-09 PROCEDURE — 1159F MED LIST DOCD IN RCRD: CPT | Mod: CPTII,S$GLB,, | Performed by: INTERNAL MEDICINE

## 2024-10-09 PROCEDURE — 99999 PR PBB SHADOW E&M-EST. PATIENT-LVL IV: CPT | Mod: PBBFAC,,, | Performed by: INTERNAL MEDICINE

## 2024-10-09 PROCEDURE — 3079F DIAST BP 80-89 MM HG: CPT | Mod: CPTII,S$GLB,, | Performed by: INTERNAL MEDICINE

## 2024-10-09 PROCEDURE — 99214 OFFICE O/P EST MOD 30 MIN: CPT | Mod: S$GLB,,, | Performed by: INTERNAL MEDICINE

## 2024-10-09 PROCEDURE — 3044F HG A1C LEVEL LT 7.0%: CPT | Mod: CPTII,S$GLB,, | Performed by: INTERNAL MEDICINE

## 2024-10-09 PROCEDURE — 3008F BODY MASS INDEX DOCD: CPT | Mod: CPTII,S$GLB,, | Performed by: INTERNAL MEDICINE

## 2024-10-09 PROCEDURE — G2211 COMPLEX E/M VISIT ADD ON: HCPCS | Mod: S$GLB,,, | Performed by: INTERNAL MEDICINE

## 2024-10-09 NOTE — PROGRESS NOTES
"Subjective:       Patient ID: Keisha Mena is a 33 y.o. female.    Chief Complaint: Follow-up      HPI:  Patient is known to me and presents for follow up EMG. Patient has been having b/l UE numbness and tingling. Waking up from sleep with numbness of b/l arms. MRI C-spine was normal. EMG was normal. She continues to have symptoms that are not worsening but not improving.     EMG 8/20/2024:  "Impression:  Normal EMG/NCS of the arms   It is important to note that EMG/NCS will NOT be revealing in disorders of the central nervous system (as in cases of demyelination)  and in most cases of Thoracic outlet syndrome. "    Today she is also having right hip pain. Has had for quite some time but worsening over last few months. She is working as  so on her feet most weekends and the next day she is having difficulty weight bearing due to pain.   No falls, injuries.    She continues pelvic floor therapy and was found to have very tender pelvic muscle on the right side. Unsure this is related to her hip pain.       Past Medical History:   Diagnosis Date    Anxiety     Ectopic pregnancy 02/06/2021    Female bladder prolapse     Ulcerative colitis 2023       Family History   Problem Relation Name Age of Onset    Hypertension Mother      Alcohol abuse Father          history of s/p liver transplant    Liver cancer Father      Breast cancer Neg Hx      Colon cancer Neg Hx      Ovarian cancer Neg Hx         Social History     Socioeconomic History    Marital status:      Spouse name: Paolo Mena    Number of children: 1   Tobacco Use    Smoking status: Never     Passive exposure: Never    Smokeless tobacco: Never   Substance and Sexual Activity    Alcohol use: Yes     Comment: rarely    Drug use: Never    Sexual activity: Yes     Partners: Male     Birth control/protection: None     Comment:      Social Drivers of Health     Financial Resource Strain: Low Risk  (5/20/2020)    Overall Financial Resource " Strain (CARDIA)     Difficulty of Paying Living Expenses: Not hard at all   Food Insecurity: No Food Insecurity (5/20/2020)    Hunger Vital Sign     Worried About Running Out of Food in the Last Year: Never true     Ran Out of Food in the Last Year: Never true   Transportation Needs: No Transportation Needs (5/20/2020)    PRAPARE - Transportation     Lack of Transportation (Medical): No     Lack of Transportation (Non-Medical): No   Physical Activity: Insufficiently Active (5/20/2020)    Exercise Vital Sign     Days of Exercise per Week: 1 day     Minutes of Exercise per Session: 10 min   Stress: Stress Concern Present (5/20/2020)    [employer] - Occupational Stress Questionnaire     Feeling of Stress : To some extent       Review of Systems   Constitutional:  Negative for activity change, fatigue, fever and unexpected weight change.   HENT:  Negative for congestion, ear pain, hearing loss, rhinorrhea and sore throat.    Eyes:  Negative for redness and visual disturbance.   Respiratory:  Negative for cough, shortness of breath and wheezing.    Cardiovascular:  Negative for chest pain, palpitations and leg swelling.   Gastrointestinal:  Negative for abdominal pain, constipation, diarrhea, nausea and vomiting.   Genitourinary:  Negative for dysuria and frequency.   Musculoskeletal:  Positive for arthralgias. Negative for back pain, joint swelling and neck pain.   Skin:  Negative for color change, rash and wound.   Neurological:  Positive for numbness. Negative for dizziness, light-headedness and headaches.         Objective:      Physical Exam  Vitals reviewed.   Constitutional:       General: She is not in acute distress.     Appearance: She is well-developed.   HENT:      Head: Normocephalic and atraumatic.      Right Ear: External ear normal.      Left Ear: External ear normal.      Nose: Nose normal.   Neck:      Thyroid: No thyromegaly.   Cardiovascular:      Rate and Rhythm: Normal  rate and regular rhythm.   Pulmonary:      Effort: Pulmonary effort is normal. No respiratory distress.   Skin:     General: Skin is warm and dry.   Neurological:      Mental Status: She is alert and oriented to person, place, and time.   Psychiatric:         Behavior: Behavior normal.         Thought Content: Thought content normal.         Assessment:       1. Thoracic outlet syndrome    2. Acute right hip pain        Plan:       1. Thoracic outlet syndrome  New diagnosis  B/l UE numbness/tingling in the AM after sleep  Possible structural due to thoracic outlet syndrome. Not having any vascular issues however.  CXR 9/2023 and CT chest 2021 without obvious radiographic changes of structural obstruction.  Will also do MRI brain to r/o any demyelinating central disease like MS. Clinically I am less concerned but without firm diagnosis for etiology of her sx worth ruling out  -     MRI Brain Demyelinating W W/O Contrast; Future; Expected date: 10/09/2024  -     X-Ray Hip 2 or 3 views Right with Pelvis when performed; Future; Expected date: 10/09/2024  -     Ambulatory referral/consult to Orthopedics; Future; Expected date: 10/16/2024    2. Acute right hip pain  New problem  Xray today  R/o avascular necrosis, OA changes  Consider PT

## 2024-10-18 ENCOUNTER — PATIENT OUTREACH (OUTPATIENT)
Dept: INTERNAL MEDICINE | Facility: CLINIC | Age: 34
End: 2024-10-18
Payer: COMMERCIAL

## 2024-10-23 ENCOUNTER — HOSPITAL ENCOUNTER (OUTPATIENT)
Dept: RADIOLOGY | Facility: HOSPITAL | Age: 34
Discharge: HOME | End: 2024-10-23
Attending: INTERNAL MEDICINE
Payer: COMMERCIAL

## 2024-10-23 ENCOUNTER — TELEPHONE (OUTPATIENT)
Dept: ORTHOPEDICS | Facility: CLINIC | Age: 34
End: 2024-10-23
Payer: COMMERCIAL

## 2024-10-23 ENCOUNTER — TELEPHONE (OUTPATIENT)
Dept: INTERNAL MEDICINE | Facility: CLINIC | Age: 34
End: 2024-10-23
Payer: COMMERCIAL

## 2024-10-23 DIAGNOSIS — G54.0 THORACIC OUTLET SYNDROME: ICD-10-CM

## 2024-10-23 PROCEDURE — 25500020 PHARM REV CODE 255: Performed by: INTERNAL MEDICINE

## 2024-10-23 PROCEDURE — A9585 GADOBUTROL INJECTION: HCPCS | Performed by: INTERNAL MEDICINE

## 2024-10-23 PROCEDURE — 73502 X-RAY EXAM HIP UNI 2-3 VIEWS: CPT | Mod: TC,RT

## 2024-10-23 PROCEDURE — 70553 MRI BRAIN STEM W/O & W/DYE: CPT | Mod: TC

## 2024-10-23 PROCEDURE — 73502 X-RAY EXAM HIP UNI 2-3 VIEWS: CPT | Mod: 26,RT,, | Performed by: RADIOLOGY

## 2024-10-23 PROCEDURE — 70553 MRI BRAIN STEM W/O & W/DYE: CPT | Mod: 26,,, | Performed by: RADIOLOGY

## 2024-10-23 RX ORDER — GADOBUTROL 604.72 MG/ML
7 INJECTION INTRAVENOUS
Status: COMPLETED | OUTPATIENT
Start: 2024-10-23 | End: 2024-10-23

## 2024-10-23 RX ADMIN — GADOBUTROL 10 ML: 604.72 INJECTION INTRAVENOUS at 10:10

## 2024-10-23 NOTE — TELEPHONE ENCOUNTER
Called pt let her know  has not reviewed her results' just yet and that we will give her a call as soon as she does.

## 2024-10-23 NOTE — TELEPHONE ENCOUNTER
Called and spoke with pt in regards to exactly what her referral is for. Pt explained to me that Dr. Fung ordered an EMG to be completed due to having numbness throughout both of her arms to see if it was carpal tunnel. She said that the test came back negative for carpal tunnel and Dr. Fung wants her to be treated for thoracic outlet syndrome and possibly for her right hip as well since she is starting pelvic floor therapy again. I explained to pt that I do  not think that [facility] treats that particular syndrome considering it has something to do with her nerves. I offered to her if she wanted to start therapy first and see if it is her hip that is affecting her or if it is her lower back area like how she mentioned or to see [facility] first so she can have an idea on what to do as far as her therapy that she will be starting. Pt voiced understanding and wanted to get scheduled for right hip with [facility] and asked what she should do about the thoracic outlet syndrome. I offered next available for 10/24 at 8 and told her that I was unsure of who to go to about that but she can definitely ask [facility] at her appointment if she knows anything or who to go to        ----- Message from Laurie sent at 10/23/2024 10:05 AM CDT -----  Contact: Patient  Keisha Mena  MRN: [id number]  : 1990  PCP: Lisa Fung  Home Phone      Not on file.  Work Phone      Not on file.  Mobile          [phone]      MESSAGE: Patient has an internal referral from Dr. Fung. Please review this referral and contact the patient to discuss scheduling.     PHONE; [phone]

## 2024-10-23 NOTE — TELEPHONE ENCOUNTER
----- Message from Concepcion sent at 10/23/2024 10:53 AM CDT -----  Contact: Pt  Keisha Mena  MRN: [id number]  : 1990  PCP: Lisa Fung  Home Phone      Not on file.  Work Phone      Not on file.  Mobile          [phone]      MESSAGE:     Pt would like MRI results and would like to know if she needs to follow up with  or .         Please advise   [phone]

## 2024-10-24 ENCOUNTER — HOSPITAL ENCOUNTER (OUTPATIENT)
Dept: RADIOLOGY | Facility: HOSPITAL | Age: 34
Discharge: HOME | End: 2024-10-24
Attending: PHYSICIAN ASSISTANT
Payer: COMMERCIAL

## 2024-10-24 ENCOUNTER — OFFICE VISIT (OUTPATIENT)
Dept: ORTHOPEDICS | Facility: CLINIC | Age: 34
End: 2024-10-24
Payer: COMMERCIAL

## 2024-10-24 ENCOUNTER — PATIENT MESSAGE (OUTPATIENT)
Dept: INTERNAL MEDICINE | Facility: CLINIC | Age: 34
End: 2024-10-24
Payer: COMMERCIAL

## 2024-10-24 VITALS
HEART RATE: 82 BPM | RESPIRATION RATE: 18 BRPM | DIASTOLIC BLOOD PRESSURE: 78 MMHG | HEIGHT: 61 IN | BODY MASS INDEX: 32.06 KG/M2 | SYSTOLIC BLOOD PRESSURE: 114 MMHG | OXYGEN SATURATION: 99 % | WEIGHT: 169.81 LBS

## 2024-10-24 DIAGNOSIS — R20.2 BILATERAL ARM NUMBNESS AND TINGLING WHILE SLEEPING: ICD-10-CM

## 2024-10-24 DIAGNOSIS — R20.0 BILATERAL ARM NUMBNESS AND TINGLING WHILE SLEEPING: ICD-10-CM

## 2024-10-24 DIAGNOSIS — M54.50 ACUTE RIGHT-SIDED LOW BACK PAIN, UNSPECIFIED WHETHER SCIATICA PRESENT: ICD-10-CM

## 2024-10-24 DIAGNOSIS — M54.50 ACUTE RIGHT-SIDED LOW BACK PAIN, UNSPECIFIED WHETHER SCIATICA PRESENT: Primary | ICD-10-CM

## 2024-10-24 DIAGNOSIS — M70.61 GREATER TROCHANTERIC BURSITIS OF RIGHT HIP: ICD-10-CM

## 2024-10-24 PROCEDURE — 3074F SYST BP LT 130 MM HG: CPT | Mod: CPTII,S$GLB,, | Performed by: PHYSICIAN ASSISTANT

## 2024-10-24 PROCEDURE — 72100 X-RAY EXAM L-S SPINE 2/3 VWS: CPT | Mod: TC

## 2024-10-24 PROCEDURE — 99999 PR PBB SHADOW E&M-EST. PATIENT-LVL III: CPT | Mod: PBBFAC,,, | Performed by: PHYSICIAN ASSISTANT

## 2024-10-24 PROCEDURE — 73030 X-RAY EXAM OF SHOULDER: CPT | Mod: TC,50

## 2024-10-24 PROCEDURE — 3078F DIAST BP <80 MM HG: CPT | Mod: CPTII,S$GLB,, | Performed by: PHYSICIAN ASSISTANT

## 2024-10-24 PROCEDURE — 73030 X-RAY EXAM OF SHOULDER: CPT | Mod: 26,50,, | Performed by: RADIOLOGY

## 2024-10-24 PROCEDURE — 99204 OFFICE O/P NEW MOD 45 MIN: CPT | Mod: S$GLB,,, | Performed by: PHYSICIAN ASSISTANT

## 2024-10-24 PROCEDURE — 3044F HG A1C LEVEL LT 7.0%: CPT | Mod: CPTII,S$GLB,, | Performed by: PHYSICIAN ASSISTANT

## 2024-10-24 PROCEDURE — 3008F BODY MASS INDEX DOCD: CPT | Mod: CPTII,S$GLB,, | Performed by: PHYSICIAN ASSISTANT

## 2024-10-24 PROCEDURE — 1159F MED LIST DOCD IN RCRD: CPT | Mod: CPTII,S$GLB,, | Performed by: PHYSICIAN ASSISTANT

## 2024-10-24 PROCEDURE — 72100 X-RAY EXAM L-S SPINE 2/3 VWS: CPT | Mod: 26,,, | Performed by: RADIOLOGY

## 2024-10-25 ENCOUNTER — CLINICAL SUPPORT (OUTPATIENT)
Dept: REHABILITATION | Facility: HOSPITAL | Age: 34
End: 2024-10-25
Attending: OBSTETRICS & GYNECOLOGY
Payer: COMMERCIAL

## 2024-10-25 DIAGNOSIS — R10.2 PELVIC PAIN: ICD-10-CM

## 2024-10-25 DIAGNOSIS — M62.838 MUSCLE SPASM: ICD-10-CM

## 2024-10-25 DIAGNOSIS — N94.10 DYSPAREUNIA IN FEMALE: Primary | ICD-10-CM

## 2024-10-25 DIAGNOSIS — R27.8 COORDINATION ABNORMAL: ICD-10-CM

## 2024-10-25 PROCEDURE — 97110 THERAPEUTIC EXERCISES: CPT | Mod: PN | Performed by: PHYSICAL THERAPIST

## 2024-10-25 PROCEDURE — 97112 NEUROMUSCULAR REEDUCATION: CPT | Mod: PN | Performed by: PHYSICAL THERAPIST

## 2024-10-25 PROCEDURE — 97163 PT EVAL HIGH COMPLEX 45 MIN: CPT | Mod: PN | Performed by: PHYSICAL THERAPIST

## 2024-10-25 PROCEDURE — 97140 MANUAL THERAPY 1/> REGIONS: CPT | Mod: PN | Performed by: PHYSICAL THERAPIST

## 2024-10-25 NOTE — PLAN OF CARE
[facility]   Physical Therapy Initial Evaluation     Date: 10/25/2024   Name: Keisha Mena  Clinic Number: [id number]    Therapy Diagnosis:   Encounter Diagnoses   Name Primary?    Dyspareunia in female Yes    Coordination abnormal     Muscle spasm     Pelvic pain      Physician: Anna Zacarias MD    Physician Orders: PT Eval and Treat PF PT  Medical Diagnosis from Referral: N94.10 (ICD-10-CM) - Dyspareunia in female, M54.50 (ICD-10-CM) - Acute right-sided low back pain, unspecified whether sciatica present M70.61 (ICD-10-CM) - Greater trochanteric bursitis of right hip  Evaluation Date: 10/25/2024  Authorization Period Expiration: 10/7/2025  Plan of Care Expiration: 1/17/2025  Progress Note Due: 11/22/2024  Visit # 1/12 Visits authorized: 1/ 1   FOTO: 1/3    Precautions: Standard     Time In: 8:05 AM  Time Out: 9:30 AM  Total Appointment Time (timed & untimed codes): 85 minutes    HISTORY      Keisha is a 34 y.o. female evaluated on 10/25/2024    Physician:  Anna Zacarias MD   Diagnosis:   Encounter Diagnoses   Name Primary?    Dyspareunia in female Yes    Coordination abnormal     Muscle spasm     Pelvic pain       Treatment ordered: Physical Therapy  Medical History:   Past Medical History:   Diagnosis Date    Anxiety     Ectopic pregnancy 02/06/2021    Female bladder prolapse     Ulcerative colitis 2023      Surgical History:   Past Surgical History:   Procedure Laterality Date    COLONOSCOPY N/A 8/29/2023    Procedure: COLONOSCOPY;  Surgeon: González Fernandez MD;  Location: [address];  Service: Colon and Rectal;  Laterality: N/A;  Referred by: Dr. Wang  Prep: PEG  Route instructions sent: portal  Other concerns: FYI Pt seen by Dr. Wang today with notes that state: Plan  -colonoscopy within a week  -continue taking cipro/flagyl  - concern for ulcerative proctitis  SW    COLONOSCOPY N/A 5/2/2024    Procedure: COLONOSCOPY;  Surgeon: Jaret Cross MD;   "Location: [facility] (4TH FLR);  Service: Endoscopy;  Laterality: N/A;  Ref By:joann Beauchamp sent via portal,PEG.  4/30/24- [facility] for precall - [facility]    DIAGNOSTIC LAPAROSCOPY N/A 2/7/2021    Procedure: LAPAROSCOPY, DIAGNOSTIC;  Surgeon: Tea Schneider MD;  Location: [facility] OR;  Service: OB/GYN;  Laterality: N/A;    LAPAROSCOPIC SALPINGECTOMY Left 2/7/2021    Procedure: SALPINGECTOMY, LAPAROSCOPIC;  Surgeon: Tea Schneider MD;  Location: [facility] OR;  Service: OB/GYN;  Laterality: Left;      Medications:   Current Outpatient Medications   Medication Sig    acetaminophen (TYLENOL) 500 MG tablet Take 500 mg by mouth every 6 (six) hours as needed for Pain.    azelaic acid (AZELEX) 15 % gel Apply to affected area of face twice daily    labetaloL (NORMODYNE) 100 MG tablet TAKE 1 TABLET BY MOUTH TWICE A DAY    mesalamine (LIALDA) 1.2 gram TbEC Take 4 tablets (4.8 g total) by mouth daily with breakfast.    omeprazole (PRILOSEC) 20 MG capsule Take 1 capsule (20 mg total) by mouth every morning.    PNV Comb12/Iron Cb/FA1/DSS/DHA (PRENATAL 12-IRON-FA1-DSS-DHA ORAL) Take by mouth once daily.     No current facility-administered medications for this visit.       Allergies:   Review of patient's allergies indicates:  No Known Allergies     Precautions: universal    OB/GYN History:  childbirth vaginal delivery x 1, 2 pregnancies - 1 ectopic    Bladder/Bowel History: trouble emptying bladder completely, urinary incontinence, and constipation/straining for movement      SUBJECTIVE     Date of onset: worse over the past year     History of current complaint: patient known to me from a prior bout of physical therapy. She has been trying to get pregnant for a while now. Will more likely pursue IVF at the beginning of the year. She has seen a fertility specialist. Her remaining tube was blocked. Always has pain on her right side. It is her right tube that is remaining. "Took a lot of dye to get through." For about the past 2 " years both arms go numb completely at night. Now going to see neurology for further assessment of her MRI.     Has significant hip pain and unable to weight bear after working on her feet all day. Has a heel spur on her right foot.     Has also been diagnosed with ulcerative colitis. Does not like medication, wants to address the musculoskeletal system first. Bowel movements have improved. [place] at the airport.     Wakes up about 4am with bilateral arm numbness. Still having to do MET because she feels like her hips are shifting. She is doing a lot of lifting at work over the weekend. Doing 30,000 steps a day on the weekend.     Moving bowels 1x a day - using squatty potty, tries not to strain. Was dealing with constipation and pain in general and with bowel evacuation. Diagnosed with ulcerative colitis. Bladder has gotten worse the last few months. Leaks, wears a pad. Tried working out - limited by heaviness and leaking. Does not feel like her POP is an issue any more. When she is on her period leakage is worse.     Patient's goals for therapy: improve pain, improve pelvic floor mobility     Pain: Patient reports 3-4/10, with 0 being the lowest and 10 being the highest. Right hip.     Activities that cause symptoms: strong urge to go, walking to the toilet, full bladder, with cough/sneeze/laugh/yell, vigorous activity or exercise, sexual activity, prolonged sitting, prolonged standing, tampon insertion, and defecating    Previous treatment included PF PT    Sexually active? Yes - but with pain on her right side    Frequency of urination:   Daytime: every hour and 1/2           Nighttime: denies     Difficulty initiating urine stream: No  Urine stream: strong  Complete emptying: No  Bladder leakage: Yes  Frequency of incidents: multiple times a day   Amount leaked (urine): teaspoons    Frequency of bowel movements: once a day to every other day, never longer than 2 days   Difficulty initiating  BM: No  Quality/Shape of BM: Harmon Stool Chart 4 with meds   Colon leakage: No  Frequency of incidents: none    Amount leaked (bowels): none   Form of protection: pad  Number of pads required in 24 hours: 1-2    Types of fluid intake: water - about 60oz a day, prebiotic drink  Diet: balanced diet, doing weight watchers - lost 7lbs   Current exercise: has a gym membership - walking, weight training at times     Occupation: Pt works as a  and job-related duties include walking, lifting. Works as a [employer].    OBJECTIVE     ORTHO SCREEN  Posture: WNL  Pelvic alignment: pelvic obliquity noted - anterior rotation of the right ilium, garuding noted of the right psoas.   Right hip extension to 0*    VAGINAL PELVIC FLOOR EXAM    EXTERNAL ASSESSMENT  Introitus: WNL  Skin condition: WNL   Sensation: WNL   Pain:  none  Voluntary contraction: minimal lift  Voluntary relaxation: nil  Involuntary contraction: not assessed  Bearing down: nil  Perineal descent: absent      INTERNAL ASSESSMENT  Pain: trigger points as follows: right levator ani greater than the left, hypersensitivity   Sensation: WNL  Vaginal vault: WNL   Muscle Bulk: hypertonus   Muscle Power: 1/5    Quality of contraction: incomplete relaxation   Specificity: WNL   Coordination: WNL   Prolapse check: palpable uterus in the central canal      TREATMENT    Keisha participated in neuromuscular re-education activities to develop Coordination, Down training, and Proprioception for 15 minutes including: pelvic floor downtraining with assist of RUSI , pelvic floor relaxation/bulging training, and rehabilitative ultrasound imaging to help visualize pelvic floor muscle contraction and relaxation with riya, Rehabilitative ultrasound - 296mL, impaired PF mobility, PVR WNL    Keisha received the following manual therapy techniques: to develop flexibility and extensibility for 25 minutes including: Mobilization of the right psoas with sidelying LTR, passive  stretch of the quadratus and psoas in sidelying, passive right hip extension  MET with right extension and left flexion     Keisha received therapeutic exercises to develop  strength, endurance, posture, and core stabilization for 10 minutes including:   Provided home exercise program for right lower extremity mobility. Educated on bringing sacroiliac belt next session. Can try and use if pain improves with it, if pain does not improve remove it.     Education: instructed on general anatomy/physiology of urinary/bowel system; discussed plan of care with patient and parent/guardian; instructed in benefits/risks of treatment; instructed in alternative methods of treatment; instructed in risks of refusing treatment; patient a parent agreed to treatment plan.     Also educated in: anatomy/physiology of pelvic floor, posture/body mechanices, diaphragmatic breathing, and behavior modifications    ASSESSMENT    Pain improved to 1-2/10 after manual therapy and physical therapist session. Advised to ld sacroiliac belt, but remove if it is painful.       This is a 34 y.o. female referred to outpatient physical therapy and presents with a medical diagnosis of N94.10 (ICD-10-CM) - Dyspareunia in female, M54.50 (ICD-10-CM) - Acute right-sided low back pain, unspecified whether sciatica present M70.61 (ICD-10-CM) - Greater trochanteric bursitis of right hip. Patient will benefit from skilled physical therapy to improve right hip mobility and PF mobility and pain.    Educational/Spiritual/Cultural needs: none  Abuse/Neglect: no signs  Nutritional Status: WDWN   Fall Risk: patient is not a fall risk    Pt's spiritual, cultural and educational needs considered and pt agreeable to plan of care and goals as stated below:     Medical Necessity is demonstrated by the following:  History  Co-morbidities and personal factors that may impact the plan of care [] LOW: no personal factors / co-morbidities  [] MODERATE: 1-2 personal factors /  co-morbidities  [x] HIGH: 3+ personal factors / co-morbidities    Moderate / High Support Documentation:   Co-morbidities affecting plan of care: none    Personal Factors:   Chronic pelvic pain, bowel and bladder impairments, sacroiliac pain     Examination  Body Structures and Functions, activity limitations and participation restrictions that may impact the plan of care [] LOW: addressing 1-2 elements  [] MODERATE: 3+ elements  [x] HIGH: 4+ elements (please support below)    Moderate / High Support Documentation: long history of pain, multiple body parts affected     Clinical Presentation [] LOW: stable  [] MODERATE: Evolving  [x] HIGH: Unstable     Decision Making/ Complexity Score: high           PLAN    Frequency: 1x per week  Duration: 12 weeks    Short Term Goals: 6 weeks   Patient will be independent with pelvic floor down training.  Patient will be able to demonstrate improved pelvic floor relaxation and contraction on rehabilitative ultrasound.  Patient will report regular meditation, diaphragmatic breathing, pelvic floor down training.   Patient will report being dry 5/7 days a week.   Patient will be independent with good water intake.   Patient will improve right hip extension to 10*.     Long Term Goals: 12 weeks   Patient will deny pelvic pressure and UI.   Patient will report improved quality of life and tolerance for activities outside of her home due to improved right hip pain.  Patient will demonstrate adequate pelvic floor coordination with contraction and relaxation on rehabilitative ultrasound.    Patient will be independent with proper core and pelvic floor coordination with progressive strength training.    Patient will deny right hip pain.   Patient will be independent with progressive home exercise program.     Physical therapy will include: therapeutic exercise, manual therapy, therapeutic activities, neuromuscular re-education, manual therapy, modalities PRN, gait training, and self-care  education.       Therapist: Rafiq Stratton, PT  Board-certified Women's Health Clinical Specialist  10/25/2024

## 2024-10-25 NOTE — PROGRESS NOTES
Subjective:      Patient ID: Keisha Mena is a 34 y.o. female.    Chief Complaint: Pain of the Right Hip    Review of patient's allergies indicates:  No Known Allergies   33 yo F presents to clinic with c/o right hip pain x few weeks.  No injury or trauma.  Pain is achy/sharp and located mostly to posterior hip/low back.  Worse with movement and improves some with rest.  She does also have pain to lateral hip that is worse with laying on her right side.    She also mentions that she was referred to ortho by PCP for arm numbness.  Numbness to both arms from shoulders down to fingers, only occurs at night.  EMG and c-spine MRI were normal, brain MRI was also completed.  She reports that these symptoms are gradually worsening.        Review of Systems   Constitutional: Negative for chills, diaphoresis and fever.   HENT:  Negative for congestion, ear discharge and ear pain.    Eyes:  Negative for blurred vision, discharge, double vision and pain.   Cardiovascular:  Negative for chest pain, claudication and cyanosis.   Respiratory:  Negative for cough, hemoptysis and shortness of breath.    Endocrine: Negative for cold intolerance and heat intolerance.   Skin:  Negative for color change, dry skin, itching and rash.   Musculoskeletal:  Positive for joint pain. Negative for arthritis, back pain, falls, gout, joint swelling, muscle weakness and neck pain.   Gastrointestinal:  Negative for abdominal pain and change in bowel habit.   Neurological:  Positive for numbness. Negative for brief paralysis, disturbances in coordination and dizziness.   Psychiatric/Behavioral:  Negative for altered mental status and depression.          Objective:          General    Constitutional: She is oriented to person, place, and time. She appears well-developed and well-nourished. No distress.   HENT:   Head: Atraumatic.   Eyes: EOM are normal. Right eye exhibits no discharge. Left eye exhibits no discharge.   Cardiovascular:  Normal rate.             Pulmonary/Chest: Effort normal. No respiratory distress.   Abdominal: Soft.   Neurological: She is alert and oriented to person, place, and time.   Psychiatric: She has a normal mood and affect. Her behavior is normal.             Right Hip Exam     Inspection   Swelling: absent  Bruising: absent  No deformity of hip.    Tenderness   The patient tender to palpation of the trochanteric bursa and SI joint.    Range of Motion   The patient has normal right hip ROM.    Muscle Strength   The patient has normal right hip strength.    Tests   Pain w/ forced internal rotation (MONICA): present  Pain w/ forced external rotation (FADIR): absent  Circumduction test: negative  Stinchfield test: negative  Log Roll: negative    Other   Sensation: normal  Left Hip Exam     Inspection   Swelling: absent  No deformity of hip.  Bruising: absent    Range of Motion   The patient has normal left hip ROM.    Muscle Strength   The patient has normal left hip strength.     Tests   Pain w/ forced internal rotation (MONICA): absent  Pain w/ forced external rotation (FADIR): absent  Circumduction test: negative  Stinchfield test: negative  Log Roll: negative    Other   Sensation: normal          Right Hand/Wrist Exam     Inspection   Scars: Wrist - absent Hand -  absent  Effusion: Wrist - absent Hand -  absent  Bruising: Wrist - absent Hand -  absent  Deformity: Wrist - deformity Hand -  deformity    Range of Motion     Wrist   Extension:  normal   Flexion:  normal   Pronation:  normal   Supination:  normal     Tests   Phalens Sign: negative  Tinel's sign (median nerve): negative  Finkelstein's test: negative  Carpal Tunnel Compression Test: negative  Cubital Tunnel Compression Test: negative      Other     Neuorologic Exam    Median Distribution: normal  Ulnar Distribution: normal  Radial Distribution: normal    Comments:  No swelling, erythema, warmth      Left Hand/Wrist Exam     Inspection   Scars: Wrist - absent Hand -   absent  Effusion: Wrist - absent Hand -  absent  Bruising: Wrist - absent Hand -  absent  Deformity: Wrist - absent Hand -  absent    Range of Motion     Wrist   Extension:  normal   Flexion:  normal   Pronation:  normal   Supination:  normal     Tests   Phalens Sign: negative  Tinel's sign (median nerve): negative  Finkelstein's test: negative  Carpal Tunnel Compression Test: negative  Cubital Tunnel Compression Test: negative      Other     Sensory Exam  Median Distribution: normal  Ulnar Distribution: normal  Radial Distribution: normal    Comments:  No swelling, erythema, warmth      Right Elbow Exam     Tests   Tinel's sign (cubital tunnel): negative      Left Elbow Exam     Tests   Tinel's sign (cubital tunnel): negative    Right Shoulder Exam     Inspection/Observation   Swelling: absent  Bruising: absent  Scars: absent  Deformity: absent    Range of Motion   Active abduction:  normal   Passive abduction:  normal   Forward Flexion:  normal   Forward Elevation: normal  Internal rotation 0 degrees:  normal     Tests & Signs   Pelaez test: negative  Impingement: negative  Lift Off Sign: negative  Belly Press: negative    Other   Sensation: normal    Left Shoulder Exam     Inspection/Observation   Swelling: absent  Bruising: absent  Scars: absent  Deformity: absent    Range of Motion   Active abduction:  normal   Passive abduction:  normal   Forward Flexion:  normal   Forward Elevation: normal  Internal rotation 0 degrees:  normal     Tests & Signs   Pelaez test: negative  Impingement: negative  Lift Off Sign: negative  Belly Press: negative    Other   Sensation: normal       Vascular Exam     Right Pulses      Radial:                    2+      Left Pulses      Radial:                    2+      Capillary Refill  Right Hand: normal capillary refill  Left Hand: normal capillary refill        Edema  Right Upper Leg: absent  Left Upper Leg: absent              Assessment:         Xray Right Hip 10/23/24:  Bone  mineralization is maintained. There is mild bilateral acetabular roof sclerosis. The femoral heads demonstrate a normal spherical contour. No fracture or dislocation. Mild degenerative changes of the pubic symphysis.       Encounter Diagnoses   Name Primary?    Acute right-sided low back pain, unspecified whether sciatica present     Bilateral arm numbness and tingling while sleeping Yes    Greater trochanteric bursitis of right hip     Bilateral arm numbness and tingling while sleeping  -     X-Ray Shoulder Complete Bilateral; Future; Expected date: 10/24/2024  -     Ambulatory referral/consult to Neurology; Future; Expected date: 10/31/2024    Acute right-sided low back pain, unspecified whether sciatica present  -     X-Ray Lumbar Spine 2 Or 3 Views; Future; Expected date: 10/24/2024  -     Ambulatory referral/consult to Physical/Occupational Therapy; Future; Expected date: 10/31/2024    Greater trochanteric bursitis of right hip  -     Ambulatory referral/consult to Physical/Occupational Therapy; Future; Expected date: 10/31/2024               Plan:         We have discussed a variety of treatment options including medications, injections, physical therapy and other alternative treatments. I also explained the indications, risks and benefits of surgery. We also discussed that at least some of her symptoms are likely related to low back pain. She would like to try PT at this time, declines referral to pain management for further evaluation/treatment of back pain.    Regarding arm numbness, pt is requesting shoulder xrays today.  I did also discuss with Dr. Contreras who recommends that she follow up with neurology.    I made the decision to obtain old records of the patient including previous notes and imaging. New imaging was ordered today of the extremity or extremities evaluated.     1. Xray bilateral shoulder  2. Xray L-spine.  3. Referral to physical therapy  4. Ice compress to the affected area 2-3x a day  for 15-20 minutes as needed for pain management.  5. Referral to neurology for further evaluation of arm numbness.  6. RTC in 6 weeks for follow-up, sooner if needed.      Patient voices understanding of and agreement with treatment plan. All of the patient's questions were answered and the patient will contact us if she has any questions or concerns in the interim.

## 2024-10-29 ENCOUNTER — CLINICAL SUPPORT (OUTPATIENT)
Dept: REHABILITATION | Facility: HOSPITAL | Age: 34
End: 2024-10-29
Attending: OBSTETRICS & GYNECOLOGY
Payer: COMMERCIAL

## 2024-10-29 DIAGNOSIS — M62.838 MUSCLE SPASM: ICD-10-CM

## 2024-10-29 DIAGNOSIS — R10.2 PELVIC PAIN: ICD-10-CM

## 2024-10-29 DIAGNOSIS — N94.10 DYSPAREUNIA IN FEMALE: Primary | ICD-10-CM

## 2024-10-29 DIAGNOSIS — R27.8 COORDINATION ABNORMAL: ICD-10-CM

## 2024-10-29 PROCEDURE — 97140 MANUAL THERAPY 1/> REGIONS: CPT | Performed by: PHYSICAL THERAPIST

## 2024-10-29 PROCEDURE — 97530 THERAPEUTIC ACTIVITIES: CPT | Performed by: PHYSICAL THERAPIST

## 2024-10-30 ENCOUNTER — LAB VISIT (OUTPATIENT)
Dept: LAB | Facility: HOSPITAL | Age: 34
End: 2024-10-30
Attending: PSYCHIATRY & NEUROLOGY
Payer: COMMERCIAL

## 2024-10-30 ENCOUNTER — TELEPHONE (OUTPATIENT)
Dept: NEUROLOGY | Facility: CLINIC | Age: 34
End: 2024-10-30

## 2024-10-30 ENCOUNTER — OFFICE VISIT (OUTPATIENT)
Dept: NEUROLOGY | Facility: CLINIC | Age: 34
End: 2024-10-30
Payer: COMMERCIAL

## 2024-10-30 ENCOUNTER — CLINICAL SUPPORT (OUTPATIENT)
Dept: REHABILITATION | Facility: HOSPITAL | Age: 34
End: 2024-10-30
Payer: COMMERCIAL

## 2024-10-30 VITALS
DIASTOLIC BLOOD PRESSURE: 98 MMHG | HEART RATE: 70 BPM | BODY MASS INDEX: 32.46 KG/M2 | RESPIRATION RATE: 16 BRPM | OXYGEN SATURATION: 98 % | SYSTOLIC BLOOD PRESSURE: 130 MMHG | WEIGHT: 171.94 LBS | HEIGHT: 61 IN

## 2024-10-30 DIAGNOSIS — K51.90 ULCERATIVE COLITIS WITHOUT COMPLICATIONS, UNSPECIFIED LOCATION: ICD-10-CM

## 2024-10-30 DIAGNOSIS — M25.50 ARTHRALGIA, UNSPECIFIED JOINT: ICD-10-CM

## 2024-10-30 DIAGNOSIS — R20.0 BILATERAL ARM NUMBNESS AND TINGLING WHILE SLEEPING: ICD-10-CM

## 2024-10-30 DIAGNOSIS — R10.2 PELVIC PAIN: Primary | ICD-10-CM

## 2024-10-30 DIAGNOSIS — M25.50 ARTHRALGIA, UNSPECIFIED JOINT: Primary | ICD-10-CM

## 2024-10-30 DIAGNOSIS — R20.2 BILATERAL ARM NUMBNESS AND TINGLING WHILE SLEEPING: ICD-10-CM

## 2024-10-30 DIAGNOSIS — G43.009 MIGRAINE WITHOUT AURA AND WITHOUT STATUS MIGRAINOSUS, NOT INTRACTABLE: ICD-10-CM

## 2024-10-30 LAB
CK SERPL-CCNC: 101 U/L (ref 20–180)
CRP SERPL-MCNC: <0.3 MG/L (ref 0–8.2)
ERYTHROCYTE [SEDIMENTATION RATE] IN BLOOD BY WESTERGREN METHOD: 4 MM/HR (ref 0–20)
RHEUMATOID FACT SERPL-ACNC: 16 IU/ML (ref 0–15)
VIT B12 SERPL-MCNC: 661 PG/ML (ref 210–950)

## 2024-10-30 PROCEDURE — 97530 THERAPEUTIC ACTIVITIES: CPT | Mod: PN,CQ

## 2024-10-30 PROCEDURE — 81374 HLA I TYPING 1 ANTIGEN LR: CPT | Performed by: PSYCHIATRY & NEUROLOGY

## 2024-10-30 PROCEDURE — 1160F RVW MEDS BY RX/DR IN RCRD: CPT | Mod: CPTII,S$GLB,, | Performed by: PSYCHIATRY & NEUROLOGY

## 2024-10-30 PROCEDURE — 99204 OFFICE O/P NEW MOD 45 MIN: CPT | Mod: S$GLB,,, | Performed by: PSYCHIATRY & NEUROLOGY

## 2024-10-30 PROCEDURE — 3008F BODY MASS INDEX DOCD: CPT | Mod: CPTII,S$GLB,, | Performed by: PSYCHIATRY & NEUROLOGY

## 2024-10-30 PROCEDURE — 3044F HG A1C LEVEL LT 7.0%: CPT | Mod: CPTII,S$GLB,, | Performed by: PSYCHIATRY & NEUROLOGY

## 2024-10-30 PROCEDURE — 99999 PR PBB SHADOW E&M-EST. PATIENT-LVL IV: CPT | Mod: PBBFAC,,, | Performed by: PSYCHIATRY & NEUROLOGY

## 2024-10-30 PROCEDURE — 1159F MED LIST DOCD IN RCRD: CPT | Mod: CPTII,S$GLB,, | Performed by: PSYCHIATRY & NEUROLOGY

## 2024-10-30 PROCEDURE — 82607 VITAMIN B-12: CPT | Performed by: PSYCHIATRY & NEUROLOGY

## 2024-10-30 PROCEDURE — 85651 RBC SED RATE NONAUTOMATED: CPT | Performed by: PSYCHIATRY & NEUROLOGY

## 2024-10-30 PROCEDURE — 86140 C-REACTIVE PROTEIN: CPT | Performed by: PSYCHIATRY & NEUROLOGY

## 2024-10-30 PROCEDURE — 3075F SYST BP GE 130 - 139MM HG: CPT | Mod: CPTII,S$GLB,, | Performed by: PSYCHIATRY & NEUROLOGY

## 2024-10-30 PROCEDURE — 86431 RHEUMATOID FACTOR QUANT: CPT | Performed by: PSYCHIATRY & NEUROLOGY

## 2024-10-30 PROCEDURE — 97140 MANUAL THERAPY 1/> REGIONS: CPT | Mod: PN,CQ

## 2024-10-30 PROCEDURE — 86038 ANTINUCLEAR ANTIBODIES: CPT | Performed by: PSYCHIATRY & NEUROLOGY

## 2024-10-30 PROCEDURE — 3080F DIAST BP >= 90 MM HG: CPT | Mod: CPTII,S$GLB,, | Performed by: PSYCHIATRY & NEUROLOGY

## 2024-10-30 PROCEDURE — 36415 COLL VENOUS BLD VENIPUNCTURE: CPT | Performed by: PSYCHIATRY & NEUROLOGY

## 2024-10-30 PROCEDURE — 82550 ASSAY OF CK (CPK): CPT | Performed by: PSYCHIATRY & NEUROLOGY

## 2024-10-31 LAB — ANA SER QL IF: NORMAL

## 2024-11-05 ENCOUNTER — TELEPHONE (OUTPATIENT)
Dept: RHEUMATOLOGY | Facility: CLINIC | Age: 34
End: 2024-11-05
Payer: COMMERCIAL

## 2024-11-06 ENCOUNTER — OFFICE VISIT (OUTPATIENT)
Dept: RHEUMATOLOGY | Facility: CLINIC | Age: 34
End: 2024-11-06
Payer: COMMERCIAL

## 2024-11-06 ENCOUNTER — CLINICAL SUPPORT (OUTPATIENT)
Dept: REHABILITATION | Facility: HOSPITAL | Age: 34
End: 2024-11-06
Payer: COMMERCIAL

## 2024-11-06 VITALS
DIASTOLIC BLOOD PRESSURE: 101 MMHG | BODY MASS INDEX: 31.57 KG/M2 | WEIGHT: 167.13 LBS | SYSTOLIC BLOOD PRESSURE: 142 MMHG | HEART RATE: 70 BPM

## 2024-11-06 DIAGNOSIS — M25.50 ARTHRALGIA, UNSPECIFIED JOINT: ICD-10-CM

## 2024-11-06 DIAGNOSIS — R20.2 BILATERAL ARM NUMBNESS AND TINGLING WHILE SLEEPING: Primary | ICD-10-CM

## 2024-11-06 DIAGNOSIS — R10.2 PELVIC PAIN: Primary | ICD-10-CM

## 2024-11-06 DIAGNOSIS — K51.90 ULCERATIVE COLITIS WITHOUT COMPLICATIONS, UNSPECIFIED LOCATION: ICD-10-CM

## 2024-11-06 DIAGNOSIS — R20.0 BILATERAL ARM NUMBNESS AND TINGLING WHILE SLEEPING: Primary | ICD-10-CM

## 2024-11-06 DIAGNOSIS — M25.551 RIGHT HIP PAIN: ICD-10-CM

## 2024-11-06 PROCEDURE — 3008F BODY MASS INDEX DOCD: CPT | Mod: CPTII,S$GLB,, | Performed by: INTERNAL MEDICINE

## 2024-11-06 PROCEDURE — 3080F DIAST BP >= 90 MM HG: CPT | Mod: CPTII,S$GLB,, | Performed by: INTERNAL MEDICINE

## 2024-11-06 PROCEDURE — 1159F MED LIST DOCD IN RCRD: CPT | Mod: CPTII,S$GLB,, | Performed by: INTERNAL MEDICINE

## 2024-11-06 PROCEDURE — 3077F SYST BP >= 140 MM HG: CPT | Mod: CPTII,S$GLB,, | Performed by: INTERNAL MEDICINE

## 2024-11-06 PROCEDURE — 1160F RVW MEDS BY RX/DR IN RCRD: CPT | Mod: CPTII,S$GLB,, | Performed by: INTERNAL MEDICINE

## 2024-11-06 PROCEDURE — 97530 THERAPEUTIC ACTIVITIES: CPT | Mod: PN,CQ

## 2024-11-06 PROCEDURE — 3044F HG A1C LEVEL LT 7.0%: CPT | Mod: CPTII,S$GLB,, | Performed by: INTERNAL MEDICINE

## 2024-11-06 PROCEDURE — 99204 OFFICE O/P NEW MOD 45 MIN: CPT | Mod: S$GLB,,, | Performed by: INTERNAL MEDICINE

## 2024-11-06 PROCEDURE — 97140 MANUAL THERAPY 1/> REGIONS: CPT | Mod: PN,CQ

## 2024-11-06 PROCEDURE — 99999 PR PBB SHADOW E&M-EST. PATIENT-LVL III: CPT | Mod: PBBFAC,,, | Performed by: INTERNAL MEDICINE

## 2024-11-06 RX ORDER — GABAPENTIN 100 MG/1
100 CAPSULE ORAL NIGHTLY
Qty: 30 CAPSULE | Refills: 5 | Status: SHIPPED | OUTPATIENT
Start: 2024-11-06

## 2024-11-06 NOTE — PROGRESS NOTES
Pelvic Health Physical Therapy   Treatment Note     Name: Keisha Mena  Clinic Number: [id number]    Physician: Anna Zacarias MD    Visit Date: 11/6/2024    Physician Orders: PT Eval and Treat PF PT  Medical Diagnosis from Referral: N94.10 (ICD-10-CM) - Dyspareunia in female, M54.50 (ICD-10-CM) - Acute right-sided low back pain, unspecified whether sciatica present M70.61 (ICD-10-CM) - Greater trochanteric bursitis of right hip  Evaluation Date: 10/25/2024  Authorization Period Expiration: 10/7/2025  Plan of Care Expiration: 1/17/2025  Progress Note Due: 11/22/2024  Visit # 4/12 Visits authorized: 1/ 1   FOTO: 1/3    Cancelled Visits: 0  No Show Visits: 0    Time In: 10:30 AM   Time Out: 11:18 AM  Total Billable Time: 43 minutes    Precautions: Standard    Subjective     Pt reports: she is feeling it more in the R groin today. She is seeing a rheumatologist this PM at [facility].  She was compliant with home exercise program.  Response to previous treatment: improved pain  Functional change: improved pain     Pain in:  3/10  Pain out: Did not rate/10  Location:  R groin      Objective     Keisha received the following manual therapy techniques: to develop flexibility and extensibility for 16 minutes including:   -Long sit test performed. No deviation noted today.  -PROM hip ext w/ hip stabilization  -PROM R hip, groin, HS, IT band, & gastroc in supine position    Not performed today:  -STM to R glut med, piriformis (5 reps of AROM IR/ER & 5 reps of PROM IR/ER), & proximal IT band        Intervention 10/29/2024    Manual therapy  Right SKTC [x]     Right gastroc ROM [x]      Trigger point release to the right psoas [x]     Right hip distraction [x]    PF manual therapy Trigger point release to the bilateral bulbocavernosus  [x]     Trigger point release to the bilateral levator ani  [x]        Keisha participated in dynamic functional therapeutic activities to improve functional performance for 27 minutes,  including:  - Bridging w/ eccentric roll down 20x's  - Supine MACO butterfly abd RTB 10x's, then single leg avoiding pelvic shift 10x's each side RTB  - Instructed pt on self soft tissue mobilization w/ use of pressure point balls while verbally guiding pt through a sequence of mobility ex's.   +Modified plantigrade hip circles 10 each direction x each LE     Applied CP to R groin post session today x 5' to relieve inflammation as needed.     Home Exercises Provided and Patient Education Provided     Education provided:   - anatomy/physiology of pelvic floor, posture/body mechanices, fluid intake/dietary modifications, and behavior modifications  Discussed progression of plan of care with patient; educated pt in activity modification; reviewed HEP with pt. Pt demonstrated and verbalized understanding of all instruction and was provided with a handout of HEP (see Patient Instructions).    Written Home Exercises Provided: Patient instructed to cont prior HEP.  Exercises were reviewed and Keisha was able to demonstrate them prior to the end of the session.  Keisha demonstrated good  understanding of the education provided.     See EMR under Patient Instructions for exercises provided 10/29/2024 .    Assessment     Completed session with good response. Pt reports positioning is a key component for her. She feels comfortable supine with knees elevated, which appears to alleviate some pain and pressure for her. Pt reports manual stretches help.     Keisha Is progressing well towards her goals.   Pt prognosis is Excellent.     Pt will continue to benefit from skilled outpatient physical therapy to address the deficits listed in the problem list box on initial evaluation, provide pt/family education and to maximize pt's level of independence in the home and community environment.     Pt's spiritual, cultural and educational needs considered and pt agreeable to plan of care and goals.     Anticipated barriers to physical therapy:  none     Short Term Goals: 6 weeks   Patient will be independent with pelvic floor down training.  Patient will be able to demonstrate improved pelvic floor relaxation and contraction on rehabilitative ultrasound.  Patient will report regular meditation, diaphragmatic breathing, pelvic floor down training.   Patient will report being dry 5/7 days a week.   Patient will be independent with good water intake.   Patient will improve right hip extension to 10*.      Long Term Goals: 12 weeks   Patient will deny pelvic pressure and UI.   Patient will report improved quality of life and tolerance for activities outside of her home due to improved right hip pain.  Patient will demonstrate adequate pelvic floor coordination with contraction and relaxation on rehabilitative ultrasound.    Patient will be independent with proper core and pelvic floor coordination with progressive strength training.    Patient will deny right hip pain.   Patient will be independent with progressive home exercise program.     Plan     Per supervising PT: Progressive bilateral hip strength training and hip flexor ROM. Will await further neuro and ortho recommendations.     Dee Valdez, PTA

## 2024-11-08 LAB
HLA B27 INTERPRETATION: NORMAL
HLA-B27 RELATED AG QL: NEGATIVE
HLA-B27 RELATED AG QL: NORMAL

## 2024-11-10 NOTE — PROGRESS NOTES
History of present illness:  34-year-old female has a 4 year history of hip pain.  It is worse on the right side than on the left.  It is primarily in the anterior hip.  She also has occasional low back pain.  She has been going to therapy for her hip.  Tylenol gives her some relief.  She has no other peripheral joint complaints.      She comes in now because of numbness in her arms at night.  She had been evaluated by Neurology without any diagnosis.  She does use a CPAP at night.  She denies any pain in the neck or shoulder.  She has had no muscle weakness.  She has no similar problem in the lower extremities.    She has a history of ulcerative colitis.  She had previous surgery but still has problems in the rectal pouch.  She is on Lialda.  Her disease is under control.    She has had no unexplained fevers.  She has a history of headache.  She has had no rash, conjunctivitis, oral ulcers, dry eye or mouth, Raynaud's phenomena, pleurisy, vaginal discharge or ulcers, thrombophlebitis.  She is a  2 para 1 with an ectopic pregnancy.  She had hypertension during the other pregnancy.  Her mother has joint problems but has not been diagnosed as a specific arthritis.    Systems review:   General: Weight has been stable   Respiratory: No shortness of breath.  GI: No liver problems   : She has had chronic bladder problems.    Physical examination:  Skin: No rashes   ENT: Adequate tears in saliva.  No conjunctivitis or oral ulcers.  Chest: Clear to auscultation   Cardiac: No murmurs, gallops, rubs   Abdomen:  No organomegaly or masses.  No tenderness to palpation   Extremities: No pedal edema had negative Adson's maneuver.    Musculoskeletal:  She has full range of motion of all peripheral joints.  She has no soft tissue swelling, erythema, or increased warmth.  She has tenderness of the lower lumbar spine in the right buttock.  She does have pain on range of motion of the right hip but has full range of  motion.  Neurologic: Normal muscle strength testing  Laboratory: Rheumatoid factor was 16 with upper limits of normal being 15.  She had negative ARTHUR.  She had normal sed rate CRP, CPK, HLA B27.  Radiology:  X-ray of the shoulder and cervical spine shows osteoarthritis.  She also has osteoarthritis in the lumbar spine.  She has degenerative changes of the sacroiliac joint but not the hip joint.    Assessment:  1.  She has underlying osteoarthritis which accounts for her hip pain   2. She has paresthesias but no definite numbness in the upper extremities at night.  3.  No evidence of underlying connective tissue disease     Plans:   1.  I placed her on gabapentin 100 mg at bedtime.  I told her the dose could be increased in the future.  2.  Return in 4 months

## 2024-11-11 ENCOUNTER — CLINICAL SUPPORT (OUTPATIENT)
Dept: REHABILITATION | Facility: HOSPITAL | Age: 34
End: 2024-11-11
Payer: COMMERCIAL

## 2024-11-11 DIAGNOSIS — R10.2 PELVIC PAIN: Primary | ICD-10-CM

## 2024-11-11 PROCEDURE — 97112 NEUROMUSCULAR REEDUCATION: CPT | Mod: PN

## 2024-11-11 PROCEDURE — 97110 THERAPEUTIC EXERCISES: CPT | Mod: PN

## 2024-11-11 PROCEDURE — 97032 APPL MODALITY 1+ESTIM EA 15: CPT | Mod: PN

## 2024-11-11 PROCEDURE — 20560 NDL INSJ W/O NJX 1 OR 2 MUSC: CPT | Mod: PN,CG

## 2024-11-11 NOTE — PROGRESS NOTES
Pelvic Health Physical Therapy   Treatment Note     Name: Keisha Mena  Clinic Number: [id number]    Physician: Anna Zacarias MD    Visit Date: 11/11/2024    Physician Orders: PT Eval and Treat PF PT  Medical Diagnosis from Referral: N94.10 (ICD-10-CM) - Dyspareunia in female, M54.50 (ICD-10-CM) - Acute right-sided low back pain, unspecified whether sciatica present M70.61 (ICD-10-CM) - Greater trochanteric bursitis of right hip  Evaluation Date: 10/25/2024  Authorization Period Expiration: 10/7/2025  Plan of Care Expiration: 1/17/2025  Progress Note Due: 11/22/2024  Visit # 4/12 Visits authorized: 1/ 1   FOTO: 1/3    Cancelled Visits: 0  No Show Visits: 0    Time In: 10:30 AM   Time Out: 11:18 AM  Total Billable Time: 43 minutes    Precautions: Standard    Subjective     Pt reports: she states that she had a wedding this weekend and was on her feet for most of the weekend.  She was compliant with home exercise program.  Response to previous treatment: improved pain  Functional change: improved pain     Pain in:  3/10  Pain out: Did not rate/10  Location:  R groin      Objective     Keisha received the following manual therapy techniques: to develop flexibility and extensibility for 16 minutes including:   -Long sit test performed. No deviation noted today.  See EMR under MEDIA for written consent provided.    Palpation Assessment to determine the necessity for Functional Dry Needling  .     Keisha received the following manual therapy techniques: 2-75 mm glute med; 1-75 mm PSIS with e-stim    Not performed today:  -STM to R glut med, piriformis (5 reps of AROM IR/ER & 5 reps of PROM IR/ER), & proximal IT band  -PROM hip ext w/ hip stabilization  -PROM R hip, groin, HS, IT band, & gastroc in supine position        Intervention 10/29/2024    Manual therapy  Right SKTC [x]     Right gastroc ROM [x]      Trigger point release to the right psoas [x]     Right hip distraction [x]    PF manual therapy Trigger point  release to the bilateral bulbocavernosus  [x]     Trigger point release to the bilateral levator ani  [x]        Keisha participated in dynamic functional therapeutic activities to improve functional performance for 27 minutes, including:  - Bridging w/ eccentric roll down 20x's  - Supine MACO butterfly abd RTB 10x's, then single leg avoiding pelvic shift 10x's each side RTB  -1 x 10 diaphragmatic breathing with TA activation    Not performed:  - Instructed pt on self soft tissue mobilization w/ use of pressure point balls while verbally guiding pt through a sequence of mobility ex's.   +Modified plantigrade hip circles 10 each direction x each LE   Applied CP to R groin post session today x 5' to relieve inflammation as needed.     Home Exercises Provided and Patient Education Provided     Education provided:   - anatomy/physiology of pelvic floor, posture/body mechanices, fluid intake/dietary modifications, and behavior modifications  Discussed progression of plan of care with patient; educated pt in activity modification; reviewed HEP with pt. Pt demonstrated and verbalized understanding of all instruction and was provided with a handout of HEP (see Patient Instructions).    Written Home Exercises Provided: Patient instructed to cont prior HEP.  Exercises were reviewed and Keisha was able to demonstrate them prior to the end of the session.  Keisha demonstrated good  understanding of the education provided.     See EMR under Patient Instructions for exercises provided 10/29/2024 .    Assessment     Completed session with good response to FDN. Patient instructed on TA activation to help wit posture while working.    Keisha Is progressing well towards her goals.   Pt prognosis is Excellent.     Pt will continue to benefit from skilled outpatient physical therapy to address the deficits listed in the problem list box on initial evaluation, provide pt/family education and to maximize pt's level of independence in the home and  community environment.     Pt's spiritual, cultural and educational needs considered and pt agreeable to plan of care and goals.     Anticipated barriers to physical therapy: none     Short Term Goals: 6 weeks   Patient will be independent with pelvic floor down training.  Patient will be able to demonstrate improved pelvic floor relaxation and contraction on rehabilitative ultrasound.  Patient will report regular meditation, diaphragmatic breathing, pelvic floor down training.   Patient will report being dry 5/7 days a week.   Patient will be independent with good water intake.   Patient will improve right hip extension to 10*.      Long Term Goals: 12 weeks   Patient will deny pelvic pressure and UI.   Patient will report improved quality of life and tolerance for activities outside of her home due to improved right hip pain.  Patient will demonstrate adequate pelvic floor coordination with contraction and relaxation on rehabilitative ultrasound.    Patient will be independent with proper core and pelvic floor coordination with progressive strength training.    Patient will deny right hip pain.   Patient will be independent with progressive home exercise program.     Plan     Per supervising PT: Progressive bilateral hip strength training and hip flexor ROM. Will await further neuro and ortho recommendations.     Barbie Bravo, PT

## 2024-11-13 ENCOUNTER — CLINICAL SUPPORT (OUTPATIENT)
Dept: REHABILITATION | Facility: HOSPITAL | Age: 34
End: 2024-11-13
Payer: COMMERCIAL

## 2024-11-13 DIAGNOSIS — M62.838 MUSCLE SPASM: Primary | ICD-10-CM

## 2024-11-13 DIAGNOSIS — R29.3 ABNORMAL POSTURE: ICD-10-CM

## 2024-11-13 PROCEDURE — 97140 MANUAL THERAPY 1/> REGIONS: CPT | Mod: PN

## 2024-11-13 PROCEDURE — 97110 THERAPEUTIC EXERCISES: CPT | Mod: PN

## 2024-11-13 NOTE — PROGRESS NOTES
Pelvic Health Physical Therapy   Treatment Note     Name: Keisha Trina  Clinic Number: [id number]    Physician: Anna Zacarias MD    Visit Date: 11/13/2024    Physician Orders: PT Eval and Treat PF PT  Medical Diagnosis from Referral: N94.10 (ICD-10-CM) - Dyspareunia in female, M54.50 (ICD-10-CM) - Acute right-sided low back pain, unspecified whether sciatica present M70.61 (ICD-10-CM) - Greater trochanteric bursitis of right hip  Evaluation Date: 10/25/2024  Authorization Period Expiration: 10/7/2025  Plan of Care Expiration: 1/17/2025  Progress Note Due: 11/22/2024  Visit # 4/12 Visits authorized: 6/ 12   FOTO: -/3    Cancelled Visits: 0  No Show Visits: 0    Time In: 0815 AM   Time Out: 0855 AM  Total Billable Time: 40 minutes    Precautions: Standard    Subjective     Pt reports: she states that she is feeling much better after the FDN.  She was compliant with home exercise program.  Response to previous treatment: improved pain  Functional change: improved pain     Pain in:  3/10  Pain out: Did not rate/10  Location:  R groin      Objective     Keisha received the following manual therapy techniques: to develop flexibility and extensibility for 16 minutes including:   -Long sit test performed. No deviation noted today.  See EMR under MEDIA for written consent provided.    Palpation Assessment to determine the necessity for Functional Dry Needling  .     Keisha received the following manual therapy techniques: 2-75 mm glute med; 1-75 mm PSIS with e-stim  -STM to R iliacus, glute med  -ilium mobs in SL for compression  Not performed today:  -STM to R glut med, piriformis (5 reps of AROM IR/ER & 5 reps of PROM IR/ER), & proximal IT band  -PROM hip ext w/ hip stabilization  -PROM R hip, groin, HS, IT band, & gastroc in supine position        Intervention 10/29/2024    Manual therapy  Right SKTC [x]     Right gastroc ROM [x]      Trigger point release to the right psoas [x]     Right hip distraction [x]    PF manual  therapy Trigger point release to the bilateral bulbocavernosus  [x]     Trigger point release to the bilateral levator ani  [x]        Keisha participated in dynamic functional therapeutic activities to improve functional performance for 27 minutes, including:  - Bridging w/ eccentric roll down 20x's  - Supine MACO butterfly abd RTB 10x's, then single leg avoiding pelvic shift 10x's each side RTB  -1 x 10 diaphragmatic breathing with TA activation    Not performed:  - Instructed pt on self soft tissue mobilization w/ use of pressure point balls while verbally guiding pt through a sequence of mobility ex's.   +Modified plantigrade hip circles 10 each direction x each LE   Applied CP to R groin post session today x 5' to relieve inflammation as needed.     Home Exercises Provided and Patient Education Provided     Education provided:   - anatomy/physiology of pelvic floor, posture/body mechanices, fluid intake/dietary modifications, and behavior modifications  Discussed progression of plan of care with patient; educated pt in activity modification; reviewed HEP with pt. Pt demonstrated and verbalized understanding of all instruction and was provided with a handout of HEP (see Patient Instructions).    Written Home Exercises Provided: Patient instructed to cont prior HEP.  Exercises were reviewed and Keisha was able to demonstrate them prior to the end of the session.  Keisha demonstrated good  understanding of the education provided.     See EMR under Patient Instructions for exercises provided 10/29/2024 .    Assessment     Completed session with no increased pain with there ex.    Keisha Is progressing well towards her goals.   Pt prognosis is Excellent.     Pt will continue to benefit from skilled outpatient physical therapy to address the deficits listed in the problem list box on initial evaluation, provide pt/family education and to maximize pt's level of independence in the home and community environment.     Pt's  spiritual, cultural and educational needs considered and pt agreeable to plan of care and goals.     Anticipated barriers to physical therapy: none     Short Term Goals: 6 weeks   Patient will be independent with pelvic floor down training.  Patient will be able to demonstrate improved pelvic floor relaxation and contraction on rehabilitative ultrasound.  Patient will report regular meditation, diaphragmatic breathing, pelvic floor down training.   Patient will report being dry 5/7 days a week.   Patient will be independent with good water intake.   Patient will improve right hip extension to 10*.      Long Term Goals: 12 weeks   Patient will deny pelvic pressure and UI.   Patient will report improved quality of life and tolerance for activities outside of her home due to improved right hip pain.  Patient will demonstrate adequate pelvic floor coordination with contraction and relaxation on rehabilitative ultrasound.    Patient will be independent with proper core and pelvic floor coordination with progressive strength training.    Patient will deny right hip pain.   Patient will be independent with progressive home exercise program.     Plan     Per supervising PT: Progressive bilateral hip strength training and hip flexor ROM. Will await further neuro and ortho recommendations.     Barbie Bravo, PT

## 2024-11-18 ENCOUNTER — CLINICAL SUPPORT (OUTPATIENT)
Dept: REHABILITATION | Facility: HOSPITAL | Age: 34
End: 2024-11-18
Payer: COMMERCIAL

## 2024-11-18 DIAGNOSIS — M62.838 MUSCLE SPASM: ICD-10-CM

## 2024-11-18 DIAGNOSIS — R10.2 PELVIC PAIN: Primary | ICD-10-CM

## 2024-11-18 PROCEDURE — 20560 NDL INSJ W/O NJX 1 OR 2 MUSC: CPT | Mod: PN,CG

## 2024-11-18 PROCEDURE — 97112 NEUROMUSCULAR REEDUCATION: CPT | Mod: PN

## 2024-11-18 PROCEDURE — 97032 APPL MODALITY 1+ESTIM EA 15: CPT | Mod: PN

## 2024-11-18 PROCEDURE — 97110 THERAPEUTIC EXERCISES: CPT | Mod: PN

## 2024-11-18 NOTE — PROGRESS NOTES
Pelvic Health Physical Therapy   Treatment Note     Name: Keisha Trina  Clinic Number: [id number]    Physician: Anna Zacarias MD    Visit Date: 11/18/2024    Physician Orders: PT Eval and Treat PF PT  Medical Diagnosis from Referral: N94.10 (ICD-10-CM) - Dyspareunia in female, M54.50 (ICD-10-CM) - Acute right-sided low back pain, unspecified whether sciatica present M70.61 (ICD-10-CM) - Greater trochanteric bursitis of right hip  Evaluation Date: 10/25/2024  Authorization Period Expiration: 10/7/2025  Plan of Care Expiration: 1/17/2025  Progress Note Due: 11/22/2024  Visit # 4/12 Visits authorized: 6/ 12   FOTO: -/3    Cancelled Visits: 0  No Show Visits: 0    Time In: 0815 AM   Time Out: 0855 AM  Total Billable Time: 40 minutes    Precautions: Standard    Subjective     Pt reports: she states that she is feeling much better after the FDN.  She was compliant with home exercise program.  Response to previous treatment: improved pain  Functional change: improved pain     Pain in:  3/10  Pain out: Did not rate/10  Location:  R groin      Objective     Keisha received the following manual therapy techniques: to develop flexibility and extensibility for 16 minutes including:   -Long sit test performed. No deviation noted today.  See EMR under MEDIA for written consent provided.    Palpation Assessment to determine the necessity for Functional Dry Needling  .     Keisha received the following manual therapy techniques: 2-75 mm glute med; 1-75 mm PSIS with e-stim    Not performed today:  -STM to R glut med, piriformis (5 reps of AROM IR/ER & 5 reps of PROM IR/ER), & proximal IT band  -PROM hip ext w/ hip stabilization  -PROM R hip, groin, HS, IT band, & gastroc in supine position  -STM to R iliacus, glute med  -ilium mobs in SL for compression      Intervention 10/29/2024    Manual therapy  Right SKTC [x]     Right gastroc ROM [x]      Trigger point release to the right psoas [x]     Right hip distraction [x]    PF manual  therapy Trigger point release to the bilateral bulbocavernosus  [x]     Trigger point release to the bilateral levator ani  [x]        Keisha participated in dynamic functional therapeutic activities to improve functional performance for 27 minutes, including:  - Bridging w/ eccentric roll down 20x's  - Supine MACO butterfly abd RTB 10x's, then single leg avoiding pelvic shift 10x's each side RTB  -TA with march 2 x 10    Not performed:  - Instructed pt on self soft tissue mobilization w/ use of pressure point balls while verbally guiding pt through a sequence of mobility ex's.   +Modified plantigrade hip circles 10 each direction x each LE   Applied CP to R groin post session today x 5' to relieve inflammation as needed.     Home Exercises Provided and Patient Education Provided     Education provided:   - anatomy/physiology of pelvic floor, posture/body mechanices, fluid intake/dietary modifications, and behavior modifications  Discussed progression of plan of care with patient; educated pt in activity modification; reviewed HEP with pt. Pt demonstrated and verbalized understanding of all instruction and was provided with a handout of HEP (see Patient Instructions).    Written Home Exercises Provided: Patient instructed to cont prior HEP.  Exercises were reviewed and Keisha was able to demonstrate them prior to the end of the session.  Keisha demonstrated good  understanding of the education provided.     See EMR under Patient Instructions for exercises provided 10/29/2024 .    Assessment     Completed session with no increased pain with there ex. She reports relief immediately post- needling.    Keisha Is progressing well towards her goals.   Pt prognosis is Excellent.     Pt will continue to benefit from skilled outpatient physical therapy to address the deficits listed in the problem list box on initial evaluation, provide pt/family education and to maximize pt's level of independence in the home and community  environment.     Pt's spiritual, cultural and educational needs considered and pt agreeable to plan of care and goals.     Anticipated barriers to physical therapy: none     Short Term Goals: 6 weeks   Patient will be independent with pelvic floor down training.  Patient will be able to demonstrate improved pelvic floor relaxation and contraction on rehabilitative ultrasound.  Patient will report regular meditation, diaphragmatic breathing, pelvic floor down training.   Patient will report being dry 5/7 days a week.   Patient will be independent with good water intake.   Patient will improve right hip extension to 10*.      Long Term Goals: 12 weeks   Patient will deny pelvic pressure and UI.   Patient will report improved quality of life and tolerance for activities outside of her home due to improved right hip pain.  Patient will demonstrate adequate pelvic floor coordination with contraction and relaxation on rehabilitative ultrasound.    Patient will be independent with proper core and pelvic floor coordination with progressive strength training.    Patient will deny right hip pain.   Patient will be independent with progressive home exercise program.     Plan     Per supervising PT: Progressive bilateral hip strength training and hip flexor ROM. Will await further neuro and ortho recommendations.     Barbie Bravo, PT

## 2024-11-20 ENCOUNTER — CLINICAL SUPPORT (OUTPATIENT)
Dept: REHABILITATION | Facility: HOSPITAL | Age: 34
End: 2024-11-20
Payer: COMMERCIAL

## 2024-11-20 ENCOUNTER — PATIENT MESSAGE (OUTPATIENT)
Dept: GASTROENTEROLOGY | Facility: CLINIC | Age: 34
End: 2024-11-20
Payer: COMMERCIAL

## 2024-11-20 DIAGNOSIS — M62.838 MUSCLE SPASM: Primary | ICD-10-CM

## 2024-11-20 DIAGNOSIS — R10.2 PELVIC PAIN: ICD-10-CM

## 2024-11-20 PROCEDURE — 97110 THERAPEUTIC EXERCISES: CPT | Mod: PN

## 2024-11-20 PROCEDURE — 97112 NEUROMUSCULAR REEDUCATION: CPT | Mod: PN

## 2024-11-20 NOTE — PROGRESS NOTES
Pelvic Health Physical Therapy   Treatment Note     Name: Keisha Trina  Clinic Number: [id number]    Physician: Anna Zacarias MD    Visit Date: 11/20/2024    Physician Orders: PT Eval and Treat PF PT  Medical Diagnosis from Referral: N94.10 (ICD-10-CM) - Dyspareunia in female, M54.50 (ICD-10-CM) - Acute right-sided low back pain, unspecified whether sciatica present M70.61 (ICD-10-CM) - Greater trochanteric bursitis of right hip  Evaluation Date: 10/25/2024  Authorization Period Expiration: 10/7/2025  Plan of Care Expiration: 1/17/2025  Progress Note Due: 11/22/2024  Visit # 4/12 Visits authorized: 6/ 12   FOTO: -/3    Cancelled Visits: 0  No Show Visits: 0    Time In: 0815 AM   Time Out: 0855 AM  Total Billable Time: 40 minutes    Precautions: Standard    Subjective     Pt reports: she states that she is feeling much better after the FDN.  She was compliant with home exercise program.  Response to previous treatment: improved pain  Functional change: improved pain     Pain in:  3/10  Pain out: Did not rate/10  Location:  R groin      Objective     Keisha received the following manual therapy techniques: to develop flexibility and extensibility for 16 minutes including:   -Long sit test performed. No deviation noted today.  See EMR under MEDIA for written consent provided.    Palpation Assessment to determine the necessity for Functional Dry Needling  .     Keisha received the following manual therapy techniques: 2-75 mm glute med; 1-75 mm PSIS with e-stim    Not performed today:  -STM to R glut med, piriformis (5 reps of AROM IR/ER & 5 reps of PROM IR/ER), & proximal IT band  -PROM hip ext w/ hip stabilization  -PROM R hip, groin, HS, IT band, & gastroc in supine position  -STM to R iliacus, glute med  -ilium mobs in SL for compression      Intervention 10/29/2024    Manual therapy  Right SKTC [x]     Right gastroc ROM [x]      Trigger point release to the right psoas [x]     Right hip distraction [x]    PF manual  therapy Trigger point release to the bilateral bulbocavernosus  [x]     Trigger point release to the bilateral levator ani  [x]        Keisha participated in dynamic functional therapeutic activities to improve functional performance for 27 minutes, including:  - SL bridge with clam RTB 2 x 10 ea  -bridging with march 2 x 10  -shuttle SL lifts with TA 2 black  -Paloff press 7#  -2 x 10 RTB shoulder extension    Not performed:  - Instructed pt on self soft tissue mobilization w/ use of pressure point balls while verbally guiding pt through a sequence of mobility ex's.   +Modified plantigrade hip circles 10 each direction x each LE   Applied CP to R groin post session today x 5' to relieve inflammation as needed.   Bridging w/ eccentric roll down 20x's  - Supine MACO butterfly abd RTB 10x's, then single leg avoiding pelvic shift 10x's each side RTB    Home Exercises Provided and Patient Education Provided     Education provided:   - anatomy/physiology of pelvic floor, posture/body mechanices, fluid intake/dietary modifications, and behavior modifications  Discussed progression of plan of care with patient; educated pt in activity modification; reviewed HEP with pt. Pt demonstrated and verbalized understanding of all instruction and was provided with a handout of HEP (see Patient Instructions).    Written Home Exercises Provided: Patient instructed to cont prior HEP.  Exercises were reviewed and Keisha was able to demonstrate them prior to the end of the session.  Keisha demonstrated good  understanding of the education provided.     See EMR under Patient Instructions for exercises provided 10/29/2024 .    Assessment     Completed session with no increased pain with there ex.     Keisha Is progressing well towards her goals.   Pt prognosis is Excellent.     Pt will continue to benefit from skilled outpatient physical therapy to address the deficits listed in the problem list box on initial evaluation, provide pt/family  education and to maximize pt's level of independence in the home and community environment.     Pt's spiritual, cultural and educational needs considered and pt agreeable to plan of care and goals.     Anticipated barriers to physical therapy: none     Short Term Goals: 6 weeks   Patient will be independent with pelvic floor down training.  Patient will be able to demonstrate improved pelvic floor relaxation and contraction on rehabilitative ultrasound.  Patient will report regular meditation, diaphragmatic breathing, pelvic floor down training.   Patient will report being dry 5/7 days a week.   Patient will be independent with good water intake.   Patient will improve right hip extension to 10*.      Long Term Goals: 12 weeks   Patient will deny pelvic pressure and UI.   Patient will report improved quality of life and tolerance for activities outside of her home due to improved right hip pain.  Patient will demonstrate adequate pelvic floor coordination with contraction and relaxation on rehabilitative ultrasound.    Patient will be independent with proper core and pelvic floor coordination with progressive strength training.    Patient will deny right hip pain.   Patient will be independent with progressive home exercise program.     Plan     Per supervising PT: Progressive bilateral hip strength training and hip flexor ROM. Will await further neuro and ortho recommendations.     Barbie Bravo, PT

## 2024-11-21 RX ORDER — MESALAMINE 1000 MG/1
1000 SUPPOSITORY RECTAL NIGHTLY
Qty: 30 SUPPOSITORY | Refills: 11 | Status: SHIPPED | OUTPATIENT
Start: 2024-11-21 | End: 2025-11-21

## 2024-11-21 NOTE — TELEPHONE ENCOUNTER
"Per PM:  - UC, involvement from the rectum to the descending colon     IBD meds:  - Lialda 4.8 G/d     - takes 2 pills AM/2 pills PM, missed a dose on     Onset 4-5 days - "Rectum opening feels very dry and itchy."- requesting if Canasa is a PRN med     - BM/d: 1-2, soft formed  Pain:     - Location: Rectum    - Ratin-3/10    - Type of pain: dull, pressure, dry feeling    - Alleviating factors: Tylenol  - missed a couple days of Omeprazole  - Denies: fever, N/V, blood, mucous, noc BM, and false BR trips  - OV: 2/3/25    Dr. Cross will be updated.      "

## 2024-11-25 ENCOUNTER — CLINICAL SUPPORT (OUTPATIENT)
Dept: REHABILITATION | Facility: HOSPITAL | Age: 34
End: 2024-11-25
Payer: COMMERCIAL

## 2024-11-25 DIAGNOSIS — M62.838 MUSCLE SPASM: Primary | ICD-10-CM

## 2024-11-25 DIAGNOSIS — R27.8 COORDINATION ABNORMAL: ICD-10-CM

## 2024-11-25 DIAGNOSIS — R29.3 ABNORMAL POSTURE: ICD-10-CM

## 2024-11-25 DIAGNOSIS — N94.10 DYSPAREUNIA IN FEMALE: ICD-10-CM

## 2024-11-25 DIAGNOSIS — R10.2 PELVIC PAIN: ICD-10-CM

## 2024-11-25 PROCEDURE — 97140 MANUAL THERAPY 1/> REGIONS: CPT | Mod: PN | Performed by: PHYSICAL THERAPIST

## 2024-11-25 PROCEDURE — 97530 THERAPEUTIC ACTIVITIES: CPT | Mod: PN | Performed by: PHYSICAL THERAPIST

## 2024-11-25 NOTE — PATIENT INSTRUCTIONS
Pelvic alignment  - lay hooklying  - put knees against the wall  - bridge up  - look at your knees   - if right is higher - right ilium will be forward (pulled by the right hip flexor)     MET - L LE flexion & R LE extension d/t R side anterior rotation noted through long sit test       Ohnut for Deep Collision Dyspareunia - [facility]

## 2024-11-25 NOTE — PROGRESS NOTES
Pelvic Health Physical Therapy   Treatment Note and Progress Note     Name: Keisha Trina  Clinic Number: [id number]    Therapy Diagnosis:   Encounter Diagnoses   Name Primary?    Muscle spasm Yes    Pelvic pain     Abnormal posture     Dyspareunia in female     Coordination abnormal      Physician: Anna Zacarias MD    Visit Date: 11/25/2024    Physician Orders: PT Eval and Treat PF PT  Medical Diagnosis from Referral: N94.10 (ICD-10-CM) - Dyspareunia in female, M54.50 (ICD-10-CM) - Acute right-sided low back pain, unspecified whether sciatica present M70.61 (ICD-10-CM) - Greater trochanteric bursitis of right hip  Evaluation Date: 10/25/2024  Authorization Period Expiration: 10/7/2025  Plan of Care Expiration: 1/17/2025  Progress Note Due: 12/25/2024  Visit # 9/12 Visits authorized: 1/10  FOTO: 2/3    Cancelled Visits: 0  No Show Visits: 0    Time In: 7:21 AM   Time Out: 8:14 AM   Total Billable Time: 53 minutes    Precautions: Standard    Subjective     Pt reports: Wedding season was not terrible. Her pain in the mornings has improved. Did MET a few times. Neurological symptoms are about the same. Ruling out AS with rheumatology.     She was compliant with home exercise program.  Response to previous treatment: improved pain  Functional change: improved pain     Pain in:  not reported/10  Pain out: not reported/10  Location:        Objective     Keisha received the following manual therapy techniques: to develop flexibility and extensibility for 15 minutes including: trigger point/myofascial release of the Internal PF  with bulbocavernosus approximation and trigger point release to the bilateral levator ani and bilateral bulbocavernosus. Tenderness to palpation with internal PF manual therapy left greater than right.    Keisha participated in dynamic functional therapeutic activities to improve functional performance for 38 minutes, including:  Educated pt use of her pelvic wand to assist with PF tension. Educated  on intercourse positions to assist with pain and use of an OhNut to assist with preventing deeper penetration. Educated on home MET when needed, but not to do it daily.        Intervention 10/29/2024 11/25/2024    Manual therapy  Right SKTC [x]     Right gastroc ROM [x]      Trigger point release to the right psoas [x]     Right hip distraction [x]    PF manual therapy Trigger point release to the bilateral bulbocavernosus  [x] [x]    Trigger point release to the bilateral levator ani  [x] [x]       Home Exercises Provided and Patient Education Provided     Education provided:   - anatomy/physiology of pelvic floor, posture/body mechanices, fluid intake/dietary modifications, and behavior modifications  Discussed progression of plan of care with patient; educated pt in activity modification; reviewed HEP with pt. Pt demonstrated and verbalized understanding of all instruction and was provided with a handout of HEP (see Patient Instructions).    Written Home Exercises Provided: Patient instructed to cont prior HEP.  Exercises were reviewed and Keisha was able to demonstrate them prior to the end of the session.  Keisha demonstrated good  understanding of the education provided.     See EMR under Patient Instructions for exercises provided 10/29/2024 .    Assessment     Patient with improved pain overall. Pelvic alignment WNL today.     Keisha Is progressing well towards her goals.   Pt prognosis is Excellent.     Pt will continue to benefit from skilled outpatient physical therapy to address the deficits listed in the problem list box on initial evaluation, provide pt/family education and to maximize pt's level of independence in the home and community environment.     Pt's spiritual, cultural and educational needs considered and pt agreeable to plan of care and goals.     Anticipated barriers to physical therapy: none     Short Term Goals: 6 weeks   Patient will be independent with pelvic floor down training. Goal met,  but with difficulty.  Patient will be able to demonstrate improved pelvic floor relaxation and contraction on rehabilitative ultrasound. Goal met 11/25/2024 - limited pelvic floor mobility  Patient will report regular meditation, diaphragmatic breathing, pelvic floor down training. Goal met 11/25/2024   Patient will report being dry 5/7 days a week. Goal not met - progressing  Patient will be independent with good water intake. Goal met 11/25/2024   Patient will improve right hip extension to 10*. Goal not met - not assessed     Long Term Goals: 12 weeks   Patient will deny pelvic pressure and UI. Goal not met - progressing  Patient will report improved quality of life and tolerance for activities outside of her home due to improved right hip pain. Goal met 11/25/2024   Patient will demonstrate adequate pelvic floor coordination with contraction and relaxation on rehabilitative ultrasound.  Goal not met - progressing  Patient will be independent with proper core and pelvic floor coordination with progressive strength training.  Goal not met - progressing  Patient will deny right hip pain. Goal not met - progressing  Patient will be independent with progressive home exercise program. Goal not met - progressing    Plan     Progressive bilateral hip strength training and hip flexor ROM. Will await further neuro and ortho recommendations.     Progressive PF manual therapy with use of pelvic wand.     Rafiq Stratton, PT

## 2024-12-05 ENCOUNTER — CLINICAL SUPPORT (OUTPATIENT)
Dept: REHABILITATION | Facility: HOSPITAL | Age: 34
End: 2024-12-05
Payer: COMMERCIAL

## 2024-12-05 DIAGNOSIS — R29.3 ABNORMAL POSTURE: ICD-10-CM

## 2024-12-05 DIAGNOSIS — R27.8 COORDINATION ABNORMAL: ICD-10-CM

## 2024-12-05 DIAGNOSIS — R10.2 PELVIC PAIN: ICD-10-CM

## 2024-12-05 DIAGNOSIS — N94.10 DYSPAREUNIA IN FEMALE: ICD-10-CM

## 2024-12-05 DIAGNOSIS — M62.838 MUSCLE SPASM: Primary | ICD-10-CM

## 2024-12-05 PROCEDURE — 97530 THERAPEUTIC ACTIVITIES: CPT | Mod: PN | Performed by: PHYSICAL THERAPIST

## 2024-12-05 PROCEDURE — 97140 MANUAL THERAPY 1/> REGIONS: CPT | Mod: PN | Performed by: PHYSICAL THERAPIST

## 2024-12-05 NOTE — PROGRESS NOTES
Pelvic Health Physical Therapy   Treatment Note      Name: Keisha Trina  Clinic Number: [id number]    Therapy Diagnosis:   Encounter Diagnoses   Name Primary?    Muscle spasm Yes    Pelvic pain     Abnormal posture     Dyspareunia in female     Coordination abnormal      Physician: Anna Zacarias MD    Visit Date: 12/5/2024    Physician Orders: PT Eval and Treat PF PT  Medical Diagnosis from Referral: N94.10 (ICD-10-CM) - Dyspareunia in female, M54.50 (ICD-10-CM) - Acute right-sided low back pain, unspecified whether sciatica present M70.61 (ICD-10-CM) - Greater trochanteric bursitis of right hip  Evaluation Date: 10/25/2024  Authorization Period Expiration: 10/7/2025  Plan of Care Expiration: 1/17/2025  Progress Note Due: 12/25/2024  Visit # 10/12 Visits authorized: 2/10  FOTO: 2/3    Cancelled Visits: 0  No Show Visits: 0    Time In: 1:06 PM   Time Out: 1:45  Total Billable Time: 39 minutes    Precautions: Standard    Subjective     Pt reports: Did some moving of things and feels a little more pain today. Yesterday had some left sided deep groin pain. Did find her pelvic wand - has not yet used it. Has not talked to her  about the [place]. Has not done MET. No further MD visits since I saw her last.       She was compliant with home exercise program.  Response to previous treatment: improved pain  Functional change: improved pain     Pain in:  3/10  Pain out: 2/10  Location:  pelvic pain       Objective     Keisha received the following manual therapy techniques: to develop flexibility and extensibility for 15 minutes including: trigger point/myofascial release of the Internal PF  with bulbocavernosus approximation and trigger point release to the bilateral levator ani and bilateral bulbocavernosus. Tenderness to palpation with internal PF manual therapy.     Keisha participated in dynamic functional therapeutic activities to improve functional performance for 23 minutes, including: encouraged regular use of  her pelvic wand to assist with PF mobility provided with lower extremity home exercise program to compliment pelvic floor mobility.        Intervention 10/29/2024 11/25/2024  12/05/2024    Manual therapy  Right SKTC [x]      Right gastroc ROM [x]       Trigger point release to the right psoas [x]      Right hip distraction [x]     PF manual therapy Trigger point release to the bilateral bulbocavernosus  [x] [x]     Trigger point release to the bilateral levator ani  [x] [x]        Home Exercises Provided and Patient Education Provided     Education provided:   - anatomy/physiology of pelvic floor, posture/body mechanices, fluid intake/dietary modifications, and behavior modifications  Discussed progression of plan of care with patient; educated pt in activity modification; reviewed HEP with pt. Pt demonstrated and verbalized understanding of all instruction and was provided with a handout of HEP (see Patient Instructions).    Written Home Exercises Provided: Patient instructed to cont prior HEP.  Exercises were reviewed and Keisha was able to demonstrate them prior to the end of the session.  Keisha demonstrated good  understanding of the education provided.     See EMR under Patient Instructions for exercises provided 10/29/2024 .    Assessment     Patient with improved pain overall.     Keisha Is progressing well towards her goals.   Pt prognosis is Excellent.     Pt will continue to benefit from skilled outpatient physical therapy to address the deficits listed in the problem list box on initial evaluation, provide pt/family education and to maximize pt's level of independence in the home and community environment.     Pt's spiritual, cultural and educational needs considered and pt agreeable to plan of care and goals.     Anticipated barriers to physical therapy: none     Short Term Goals: 6 weeks   Patient will be independent with pelvic floor down training. Goal met, but with difficulty.  Patient will be able to  demonstrate improved pelvic floor relaxation and contraction on rehabilitative ultrasound. Goal met 11/25/2024 - limited pelvic floor mobility  Patient will report regular meditation, diaphragmatic breathing, pelvic floor down training. Goal met 11/25/2024   Patient will report being dry 5/7 days a week. Goal not met - progressing  Patient will be independent with good water intake. Goal met 11/25/2024   Patient will improve right hip extension to 10*. Goal not met - not assessed     Long Term Goals: 12 weeks   Patient will deny pelvic pressure and UI. Goal not met - progressing  Patient will report improved quality of life and tolerance for activities outside of her home due to improved right hip pain. Goal met 11/25/2024   Patient will demonstrate adequate pelvic floor coordination with contraction and relaxation on rehabilitative ultrasound.  Goal not met - progressing  Patient will be independent with proper core and pelvic floor coordination with progressive strength training.  Goal not met - progressing  Patient will deny right hip pain. Goal not met - progressing  Patient will be independent with progressive home exercise program. Goal not met - progressing    Plan     Progressive bilateral hip strength training and hip flexor ROM. Will await further neuro and ortho recommendations.     Progressive PF manual therapy with use of pelvic wand.     Rafiq Stratton, PT

## 2024-12-12 ENCOUNTER — CLINICAL SUPPORT (OUTPATIENT)
Dept: REHABILITATION | Facility: HOSPITAL | Age: 34
End: 2024-12-12
Payer: COMMERCIAL

## 2024-12-12 DIAGNOSIS — N94.10 DYSPAREUNIA IN FEMALE: ICD-10-CM

## 2024-12-12 DIAGNOSIS — M54.50 ACUTE RIGHT-SIDED LOW BACK PAIN, UNSPECIFIED WHETHER SCIATICA PRESENT: ICD-10-CM

## 2024-12-12 DIAGNOSIS — M62.838 MUSCLE SPASM: Primary | ICD-10-CM

## 2024-12-12 DIAGNOSIS — R27.8 COORDINATION ABNORMAL: ICD-10-CM

## 2024-12-12 DIAGNOSIS — R29.3 ABNORMAL POSTURE: ICD-10-CM

## 2024-12-12 DIAGNOSIS — R10.2 PELVIC PAIN: ICD-10-CM

## 2024-12-12 DIAGNOSIS — M70.61 GREATER TROCHANTERIC BURSITIS OF RIGHT HIP: ICD-10-CM

## 2024-12-12 PROCEDURE — 97530 THERAPEUTIC ACTIVITIES: CPT | Mod: PN | Performed by: PHYSICAL THERAPIST

## 2024-12-12 PROCEDURE — 97140 MANUAL THERAPY 1/> REGIONS: CPT | Mod: PN | Performed by: PHYSICAL THERAPIST

## 2024-12-12 NOTE — PROGRESS NOTES
Pelvic Health Physical Therapy   Treatment Note      Name: Keisha Mena  Clinic Number: [id number]    Therapy Diagnosis:   Encounter Diagnoses   Name Primary?    Acute right-sided low back pain, unspecified whether sciatica present     Greater trochanteric bursitis of right hip     Muscle spasm Yes    Pelvic pain     Abnormal posture     Dyspareunia in female     Coordination abnormal      Physician: Anna Zacarias MD    Visit Date: 12/12/2024    Physician Orders: PT Eval and Treat PF PT  Medical Diagnosis from Referral: N94.10 (ICD-10-CM) - Dyspareunia in female, M54.50 (ICD-10-CM) - Acute right-sided low back pain, unspecified whether sciatica present M70.61 (ICD-10-CM) - Greater trochanteric bursitis of right hip  Evaluation Date: 10/25/2024  Authorization Period Expiration: 10/7/2025  Plan of Care Expiration: 1/17/2025  Progress Note Due: 12/25/2024  Visit # 11/12 Visits authorized: 2/10  FOTO: 2/3    Cancelled Visits: 0  No Show Visits: 0    Time In: 9:02 AM   Time Out: 9:44 AM   Total Billable Time: 42 minutes    Precautions: Standard    Subjective     Pt reports: Feels good. Her period is very light today. Leakage is worse during her periods, but it is not bad. Outside of that  of this the bladder control is good. No pain today. May have had pain on Saturday. Had 2 18 hour days with work. Using her pelvic wand. More painful on her left side. Right side has always been uncomfortable.       She was compliant with home exercise program.  Response to previous treatment: improved pain  Functional change: improved pain     Pain in:  0/10  Pain out: 0/10  Location:  pelvic pain       Objective     Keisha received the following manual therapy techniques: to develop flexibility and extensibility for 15 minutes including: trigger point/myofascial release of the Internal PF  with bulbocavernosus approximation and trigger point release to the bilateral levator ani and bilateral bulbocavernosus. Tenderness to palpation  with internal PF manual therapy.     Keisha participated in dynamic functional therapeutic activities to improve functional performance for 27 minutes, including: encouraged regular use of her pelvic wand to assist with PF mobility provided with lower extremity home exercise program to compliment pelvic floor mobility. Encouraged intercourse positions to assist with pain with intercourse.        Intervention 10/29/2024 11/25/2024  12/05/2024  12/12/2024    Manual therapy  Right SKTC [x]   trigger point/myofascial release of the Internal PF  with bulbocavernosus approximation and trigger point release to the bilateral levator ani and bilateral bulbocavernosus. Tenderness to palpation with internal PF manual therapy.     Right gastroc ROM [x]        Trigger point release to the right psoas [x]       Right hip distraction [x]      PF manual therapy Trigger point release to the bilateral bulbocavernosus  [x] [x]      Trigger point release to the bilateral levator ani  [x] [x]     TherAct     encouraged regular use of her pelvic wand to assist with PF mobility provided with lower extremity home exercise program to compliment pelvic floor mobility. Encouraged intercourse positions to assist with pain with intercourse.        Home Exercises Provided and Patient Education Provided     Education provided:   - anatomy/physiology of pelvic floor, posture/body mechanices, fluid intake/dietary modifications, and behavior modifications  Discussed progression of plan of care with patient; educated pt in activity modification; reviewed HEP with pt. Pt demonstrated and verbalized understanding of all instruction and was provided with a handout of HEP (see Patient Instructions).    Written Home Exercises Provided: Patient instructed to cont prior HEP.  Exercises were reviewed and Keisha was able to demonstrate them prior to the end of the session.  Keisha demonstrated good  understanding of the education provided.     See EMR under Patient  Instructions for exercises provided 10/29/2024 .    Assessment     Patient with improved pain overall. Good carryover with physical therapist recommendations.     Keisha Is progressing well towards her goals.   Pt prognosis is Excellent.     Pt will continue to benefit from skilled outpatient physical therapy to address the deficits listed in the problem list box on initial evaluation, provide pt/family education and to maximize pt's level of independence in the home and community environment.     Pt's spiritual, cultural and educational needs considered and pt agreeable to plan of care and goals.     Anticipated barriers to physical therapy: none     Short Term Goals: 6 weeks   Patient will be independent with pelvic floor down training. Goal met, but with difficulty.  Patient will be able to demonstrate improved pelvic floor relaxation and contraction on rehabilitative ultrasound. Goal met 11/25/2024 - limited pelvic floor mobility  Patient will report regular meditation, diaphragmatic breathing, pelvic floor down training. Goal met 11/25/2024   Patient will report being dry 5/7 days a week. Goal not met - progressing  Patient will be independent with good water intake. Goal met 11/25/2024   Patient will improve right hip extension to 10*. Goal not met - not assessed     Long Term Goals: 12 weeks   Patient will deny pelvic pressure and UI. Goal not met - progressing  Patient will report improved quality of life and tolerance for activities outside of her home due to improved right hip pain. Goal met 11/25/2024   Patient will demonstrate adequate pelvic floor coordination with contraction and relaxation on rehabilitative ultrasound.  Goal not met - progressing  Patient will be independent with proper core and pelvic floor coordination with progressive strength training.  Goal not met - progressing  Patient will deny right hip pain. Goal not met - progressing  Patient will be independent with progressive home exercise  program. Goal not met - progressing    Plan     Progressive bilateral hip strength training and hip flexor ROM. Will await further neuro and ortho recommendations.     Progressive PF manual therapy with use of pelvic wand.     Rafiq Stratton, PT

## 2025-01-16 ENCOUNTER — CLINICAL SUPPORT (OUTPATIENT)
Dept: REHABILITATION | Facility: HOSPITAL | Age: 35
End: 2025-01-16
Payer: COMMERCIAL

## 2025-01-16 DIAGNOSIS — R10.2 PELVIC PAIN: Primary | ICD-10-CM

## 2025-01-16 DIAGNOSIS — N94.10 DYSPAREUNIA IN FEMALE: ICD-10-CM

## 2025-01-16 DIAGNOSIS — R29.3 ABNORMAL POSTURE: ICD-10-CM

## 2025-01-16 DIAGNOSIS — R27.8 COORDINATION ABNORMAL: ICD-10-CM

## 2025-01-16 PROCEDURE — 97530 THERAPEUTIC ACTIVITIES: CPT | Mod: PN | Performed by: PHYSICAL THERAPIST

## 2025-01-16 PROCEDURE — 97140 MANUAL THERAPY 1/> REGIONS: CPT | Mod: PN | Performed by: PHYSICAL THERAPIST

## 2025-01-16 NOTE — PROGRESS NOTES
Pelvic Health Physical Therapy   Treatment Note  and Updated POC     Name: Keisha Mena  Clinic Number: [id number]    Therapy Diagnosis:   Encounter Diagnoses   Name Primary?    Pelvic pain Yes    Abnormal posture     Dyspareunia in female     Coordination abnormal        Physician: Anna Zacarias MD    Visit Date: 1/16/2025    Physician Orders: PT Eval and Treat PF PT  Medical Diagnosis from Referral: N94.10 (ICD-10-CM) - Dyspareunia in female, M54.50 (ICD-10-CM) - Acute right-sided low back pain, unspecified whether sciatica present M70.61 (ICD-10-CM) - Greater trochanteric bursitis of right hip  Evaluation Date: 10/25/2024  Authorization Period Expiration: 10/7/2025  Plan of Care Expiration: 1/17/2025  Progress Note Due: 12/25/2024  Visit # 11/12 Visits authorized: 2/10  FOTO: 2/3    Cancelled Visits: 0  No Show Visits: 0    Time In: 3:23 PM   Time Out: 4:01 PM   Total Billable Time: 38 minutes    Precautions: Standard    Subjective     Pt reports: Seeing MD for IVF. Going to [place] in 27 days. Doing IVF in March/April 2025. Arms have been a lot better. Her back has been better. Will have random spasms throughout the day - then it goes away. Bladder control is good she thinks. Had to strain for bowel movements recently. Has increased her water. Pain is better with intercourse.      She was compliant with home exercise program.  Response to previous treatment: improved pain  Functional change: improved pain     Pain in:  0/10  Pain out: 0/10  Location:  pelvic pain       Objective     Keisha received the following manual therapy techniques: to develop flexibility and extensibility for 30 minutes including: trigger point/myofascial release of the Internal PF  with bulbocavernosus approximation and trigger point release to the bilateral levator ani and bilateral bulbocavernosus. Tenderness to palpation with internal PF manual therapy.     Keisha participated in dynamic functional therapeutic activities to improve  functional performance for 8 minutes, including: encouraged regular use of her pelvic wand to assist with PF mobility provided with lower extremity home exercise program to compliment pelvic floor mobility. Encouraged intercourse positions to assist with pain with intercourse.        Intervention 10/29/2024 11/25/2024  12/05/2024  12/12/2024  01/16/2025    Manual therapy  Right SKTC [x]   trigger point/myofascial release of the Internal PF  with bulbocavernosus approximation and trigger point release to the bilateral levator ani and bilateral bulbocavernosus. Tenderness to palpation with internal PF manual therapy.  trigger point/myofascial release of the Internal PF  with bulbocavernosus approximation and trigger point release to the bilateral levator ani and bilateral bulbocavernosus. Tenderness to palpation with internal PF manual therapy.       Right gastroc ROM [x]         Trigger point release to the right psoas [x]        Right hip distraction [x]       PF manual therapy Trigger point release to the bilateral bulbocavernosus  [x] [x]       Trigger point release to the bilateral levator ani  [x] [x]      TherAct     encouraged regular use of her pelvic wand to assist with PF mobility provided with lower extremity home exercise program to compliment pelvic floor mobility. Encouraged intercourse positions to assist with pain with intercourse.   encouraged regular use of her pelvic wand to assist with PF mobility provided with lower extremity home exercise program to compliment pelvic floor mobility. Encouraged intercourse positions to assist with pain with intercourse.        Home Exercises Provided and Patient Education Provided     Education provided:   - anatomy/physiology of pelvic floor, posture/body mechanices, fluid intake/dietary modifications, and behavior modifications  Discussed progression of plan of care with patient; educated pt in activity modification; reviewed HEP with pt. Pt demonstrated and  verbalized understanding of all instruction and was provided with a handout of HEP (see Patient Instructions).    Written Home Exercises Provided: Patient instructed to cont prior HEP.  Exercises were reviewed and Keisha was able to demonstrate them prior to the end of the session.  Keisha demonstrated good  understanding of the education provided.     See EMR under Patient Instructions for exercises provided 10/29/2024 .    Assessment     Patient with improved pain overall. Good carryover with physical therapist recommendations.     Keisha Is progressing well towards her goals.   Pt prognosis is Excellent.     Pt will continue to benefit from skilled outpatient physical therapy to address the deficits listed in the problem list box on initial evaluation, provide pt/family education and to maximize pt's level of independence in the home and community environment.     Pt's spiritual, cultural and educational needs considered and pt agreeable to plan of care and goals.     Anticipated barriers to physical therapy: none     Short Term Goals: 6 weeks   Patient will be independent with pelvic floor down training. Goal met, but with difficulty.  Patient will be able to demonstrate improved pelvic floor relaxation and contraction on rehabilitative ultrasound. Goal met 11/25/2024 - limited pelvic floor mobility  Patient will report regular meditation, diaphragmatic breathing, pelvic floor down training. Goal met 11/25/2024   Patient will report being dry 5/7 days a week. Goal not met - progressing  Patient will be independent with good water intake. Goal met 11/25/2024   Patient will improve right hip extension to 10*. Goal not met - not assessed     Long Term Goals: 12 weeks   Patient will deny pelvic pressure and UI. Goal not met - progressing  Patient will report improved quality of life and tolerance for activities outside of her home due to improved right hip pain. Goal met 11/25/2024   Patient will demonstrate adequate  pelvic floor coordination with contraction and relaxation on rehabilitative ultrasound.  Goal not met - progressing  Patient will be independent with proper core and pelvic floor coordination with progressive strength training.  Goal not met - progressing  Patient will deny right hip pain. Goal not met - progressing  Patient will be independent with progressive home exercise program. Goal not met - progressing    Plan     Progressive bilateral hip strength training and hip flexor ROM. Will await further neuro and ortho recommendations.     Progressive PF manual therapy with use of pelvic wand.     Rafiq Stratton, PT

## 2025-01-17 ENCOUNTER — OFFICE VISIT (OUTPATIENT)
Dept: GASTROENTEROLOGY | Facility: CLINIC | Age: 35
End: 2025-01-17
Payer: COMMERCIAL

## 2025-01-17 VITALS
DIASTOLIC BLOOD PRESSURE: 90 MMHG | HEIGHT: 61 IN | OXYGEN SATURATION: 100 % | TEMPERATURE: 98 F | BODY MASS INDEX: 33.05 KG/M2 | SYSTOLIC BLOOD PRESSURE: 141 MMHG | HEART RATE: 79 BPM | RESPIRATION RATE: 16 BRPM | WEIGHT: 175.06 LBS

## 2025-01-17 DIAGNOSIS — K51.90 ULCERATIVE COLITIS WITHOUT COMPLICATIONS, UNSPECIFIED LOCATION: ICD-10-CM

## 2025-01-17 DIAGNOSIS — R12 HEARTBURN: Primary | ICD-10-CM

## 2025-01-17 PROCEDURE — 3008F BODY MASS INDEX DOCD: CPT | Mod: CPTII,S$GLB,, | Performed by: INTERNAL MEDICINE

## 2025-01-17 PROCEDURE — 1159F MED LIST DOCD IN RCRD: CPT | Mod: CPTII,S$GLB,, | Performed by: INTERNAL MEDICINE

## 2025-01-17 PROCEDURE — 1160F RVW MEDS BY RX/DR IN RCRD: CPT | Mod: CPTII,S$GLB,, | Performed by: INTERNAL MEDICINE

## 2025-01-17 PROCEDURE — 3080F DIAST BP >= 90 MM HG: CPT | Mod: CPTII,S$GLB,, | Performed by: INTERNAL MEDICINE

## 2025-01-17 PROCEDURE — 99213 OFFICE O/P EST LOW 20 MIN: CPT | Mod: S$GLB,,, | Performed by: INTERNAL MEDICINE

## 2025-01-17 PROCEDURE — 3077F SYST BP >= 140 MM HG: CPT | Mod: CPTII,S$GLB,, | Performed by: INTERNAL MEDICINE

## 2025-01-17 PROCEDURE — G2211 COMPLEX E/M VISIT ADD ON: HCPCS | Mod: S$GLB,,, | Performed by: INTERNAL MEDICINE

## 2025-01-17 NOTE — PROGRESS NOTES
[facility]          Inflammatory Bowel Disease Follow Up Note         TODAY'S VISIT DATE:  1/17/2025    Reason for Consult:    Chief Complaint   Patient presents with    Ulcerative Colitis       PCP: Lisa Fung      Referring MD:   No ref. provider found    History of Present Illness:  Keisha Mena who is a 34 y.o. female is being seen today at the [facility] on 01/17/2025 for inflammatory bowel disease- ulcerative colitis.  She is doing great.  She reports that she is having no issues related to her ulcerative colitis.  She has 1-2 formed bowel movements daily with no blood in his stools and no abdominal pain.  Her reflux is under good control with the omeprazole 20 mg daily.  She denies any new complaints today.  She is planning on starting IVF in the near future.  She is thinking this will likely start around March or April.  She denies any other complaints today.  She did have an episode of diarrhea and fevers that lasted for about 1 day around [place] but resolve without any issues.    IBD History:  In early August she began having rectal pain with bowel movements as well as tenesmus and urgency.  She saw primary care doctor it was initially concern for possible perianal abscess but none was ever identified.  She was treated with antibiotics and took this for about 7 days.  About 2 weeks after her initial presentation she developed a fever and went to the emergency room.  A CT scan at that time showed mild wall thickening of the rectum concerning for possible proctitis.  She was subsequently seen by Colorectal surgery and a colonoscopy was scheduled.  Her colonoscopy occurred on August 29, 2023.  At that time she was found to have inflammatory changes of the rectum, sigmoid colon, and descending colon.  The remainder of the colon was normal.  Biopsy showed active inflammation but no definitive chronic changes.  She was given  "sulfasalazine 500 mg twice daily and reports that while taking this she continued to feel bad and actually developed palpitations, headaches, and ear pain.  She stopped taking it after a few days.  In September 2023 we started Canasa suppositories daily and Lialda 2.4 g daily.  In January of 2024 she was noting some improvement but still having some blood in his stools intermittently.  We decided to increase the dose of Lialda 4.8 g daily and continue Canasa suppositories.  A follow-up colonoscopy in May of 2024 showed no evidence of endoscopic activity.  Biopsy showed no active inflammation.  We decided to stop the Canasa suppositories at that time.    IBD Details:  Dx Date:  August 2023  Disease type/distribution:  Ulcerative colitis/involvement from the rectum to the descending colon  Current Treatment:  Lialda 4.8 g daily Start Date:  September 2023 Response:  Endoscopic and histologic remission  Optimized:  Yes Adverse reactions:  None  Prior surgeries:  None  CRP Elevation:  Not elevated  calprotectin:  Low at baseline  Disease Complications:  None  Extraintestinal manifestations:  None  Prior treatments:   Steroids:  None  5ASA:  Currently on Canasa and Lialda  IMM:  None  TNF Inh:  None   Anti-Integrin:  None   IL 12/23:  None  ALONSO Inh:  None    Previous Clinical Trials:  None    Last Colonoscopy:   May 2, 2024-normal colon-biopsies with no active inflammation  August 2023-inflammatory changes involving the rectum, sigmoid, descending colon, otherwise normal    Other Endoscopies:  None    Imaging:   MRE:  None   CT:  August 2023-rectal thickening concerning for likely proctitis   Other:  No other pertinent imaging    Pertinent Labs:  Lab Results   Component Value Date    SEDRATE 4 10/30/2024    CRP <0.3 10/30/2024     No results found for: "TTGIGA", "IGA"  Lab Results   Component Value Date    TSH 1.558 04/22/2024    FREET4 1.02 04/22/2024     Lab Results   Component Value Date    BVYVFPFE93PT 38 09/15/2023 " "   VPQXABTX34 661 10/30/2024     Lab Results   Component Value Date    HEPBSAG Non-reactive 09/15/2023    HEPBCAB Non-reactive 09/15/2023    HEPCAB Non-reactive 09/15/2023     Lab Results   Component Value Date    EUH11RBVD Negative 04/04/2019     Lab Results   Component Value Date    NIL 0.03360 09/15/2023    MITOGENNIL 9.967 09/15/2023     Lab Results   Component Value Date    TPTMINTERP SEE BELOW 09/15/2023     No results found for: "STOOLCULTURE", "FJLDKUSFJM8E", "VGKHYLGRHG6V", "CDIFFICILEAN", "CDIFFTOX", "CDIFFICILEBY"  Lab Results   Component Value Date    CALPROTECTIN <27.1 09/16/2023       Therapeutic Drug Monitoring Labs:  No results found for: "PROMETH"  No results found for: "ANSADAINIT", "INFLIXIMAB", "INFLIXINTERP"    Vaccinations:  No results found for: "HEPBSAB"  Lab Results   Component Value Date    HEPAIGG Non-reactive 09/15/2023     Lab Results   Component Value Date    VARICELLAZOS 1982.00 09/15/2023    VARICELLAINT Positive 09/15/2023     Immunization History   Administered Date(s) Administered    COVID-19, MRNA, LN-S, PF (Pfizer) (Purple Cap) 12/18/2020, 01/08/2021, 12/06/2021    COVID-19, mRNA, LNP-S, bivalent booster, PF (PFIZER OMICRON) 11/11/2022    DTaP 1988, 03/21/1989, 06/18/1989, 03/20/1990    HIB 07/25/1990    IPV 1988, 03/21/1989, 03/20/1990    Influenza - Quadrivalent 01/05/2015    Influenza - Quadrivalent - PF *Preferred* (6 months and older) 09/11/2019    Influenza - Trivalent - Fluarix, Flulaval, Fluzone, Afluria - PF 01/03/2025    MMR 03/20/1990    PPD Test 04/22/2016    Tdap 06/27/2016, 09/11/2019         Review of Systems  Review of Systems   Constitutional:  Negative for chills, fever and weight loss.   HENT:  Negative for sore throat.    Eyes:  Negative for pain, discharge and redness.   Respiratory:  Negative for cough, shortness of breath and wheezing.    Cardiovascular:  Negative for chest pain, orthopnea and leg swelling.   Gastrointestinal:  Negative for " abdominal pain, blood in stool, constipation, diarrhea, heartburn, melena, nausea and vomiting.   Genitourinary:  Negative for dysuria, frequency and urgency.   Musculoskeletal:  Negative for back pain, joint pain and myalgias.   Skin:  Negative for itching and rash.   Neurological:  Negative for focal weakness and seizures.   Endo/Heme/Allergies:  Does not bruise/bleed easily.   Psychiatric/Behavioral:  Negative for depression. The patient is not nervous/anxious.        Medical History:   Past Medical History:   Diagnosis Date    Anxiety     Ectopic pregnancy 02/06/2021    Female bladder prolapse     Ulcerative colitis 2023       Surgical History:  Past Surgical History:   Procedure Laterality Date    COLONOSCOPY N/A 8/29/2023    Procedure: COLONOSCOPY;  Surgeon: González Fernandez MD;  Location: [facility] (4TH FLR);  Service: Colon and Rectal;  Laterality: N/A;  Referred by: Dr. Wang  Prep: PEG  Route instructions sent: portal  Other concerns: FYI Pt seen by Dr. Wang today with notes that state: Plan  -colonoscopy within a week  -continue taking cipro/flagyl  - concern for ulcerative proctitis  SW    COLONOSCOPY N/A 5/2/2024    Procedure: COLONOSCOPY;  Surgeon: Jaret Cross MD;  Location: [facility] (4TH FLR);  Service: Endoscopy;  Laterality: N/A;  Ref By:joann Beauchamp sent via portal,PEG.  4/30/24- [facility] for precall - ERW    DIAGNOSTIC LAPAROSCOPY N/A 2/7/2021    Procedure: LAPAROSCOPY, DIAGNOSTIC;  Surgeon: Tea Schneider MD;  Location: [facility] OR;  Service: OB/GYN;  Laterality: N/A;    LAPAROSCOPIC SALPINGECTOMY Left 2/7/2021    Procedure: SALPINGECTOMY, LAPAROSCOPIC;  Surgeon: Tea Schneider MD;  Location: [facility] OR;  Service: OB/GYN;  Laterality: Left;       Family History:   Family History   Problem Relation Name Age of Onset    Hypertension Mother      Alcohol abuse Father          history of s/p liver transplant    Liver cancer Father      Breast cancer Neg Hx      Colon cancer Neg  "Hx      Ovarian cancer Neg Hx         Social History:   Social History     Tobacco Use    Smoking status: Never     Passive exposure: Never    Smokeless tobacco: Never   Substance Use Topics    Alcohol use: Yes     Comment: rarely    Drug use: Never       Allergies: Reviewed    Home Medications:   Medication List with Changes/Refills   Current Medications    ACETAMINOPHEN (TYLENOL) 500 MG TABLET    Take 500 mg by mouth every 6 (six) hours as needed for Pain.    GABAPENTIN (NEURONTIN) 100 MG CAPSULE    Take 1 capsule (100 mg total) by mouth every evening.    LABETALOL (NORMODYNE) 100 MG TABLET    TAKE 1 TABLET BY MOUTH TWICE A DAY    MESALAMINE (CANASA) 1000 MG SUPP    Place 1 suppository (1,000 mg total) rectally nightly.    MESALAMINE (LIALDA) 1.2 GRAM TBEC    Take 4 tablets (4.8 g total) by mouth daily with breakfast.    OMEPRAZOLE (PRILOSEC) 20 MG CAPSULE    Take 1 capsule (20 mg total) by mouth every morning.    PNV COMB12/IRON CB/FA1/DSS/DHA (PRENATAL 12-IRON-FA1-DSS-DHA ORAL)    Take by mouth once daily.       Physical Exam:  Vital Signs:  BP (!) 141/90 (BP Location: Left arm, Patient Position: Sitting)   Pulse 79   Temp 98.4 °F (36.9 °C) (Oral)   Resp 16   Ht 5' 1" (1.549 m)   Wt 79.4 kg (175 lb 0.7 oz)   SpO2 100%   BMI 33.07 kg/m²   Body mass index is 33.07 kg/m².    Physical Exam  Vitals and nursing note reviewed.   Constitutional:       General: She is not in acute distress.     Appearance: Normal appearance. She is well-developed. She is not ill-appearing or toxic-appearing.   Eyes:      Conjunctiva/sclera: Conjunctivae normal.      Pupils: Pupils are equal, round, and reactive to light.   Neck:      Thyroid: No thyromegaly.   Cardiovascular:      Rate and Rhythm: Normal rate and regular rhythm.      Heart sounds: Normal heart sounds. No murmur heard.  Pulmonary:      Effort: Pulmonary effort is normal.      Breath sounds: Normal breath sounds. No wheezing or rales.   Abdominal:      General: " Bowel sounds are normal. There is no distension.      Palpations: Abdomen is soft. There is no mass.      Tenderness: There is no abdominal tenderness.   Musculoskeletal:         General: No tenderness. Normal range of motion.      Right lower leg: No edema.      Left lower leg: No edema.   Lymphadenopathy:      Cervical: No cervical adenopathy.   Skin:     Findings: No erythema or rash.   Neurological:      General: No focal deficit present.      Mental Status: She is alert and oriented to person, place, and time.   Psychiatric:         Mood and Affect: Mood normal.         Behavior: Behavior normal.         Thought Content: Thought content normal.         Judgment: Judgment normal.         Labs: reviewed and pertinent noted above    Assessment/Plan:  Keisha Mena is a 34 y.o. female with left-sided ulcerative colitis. The following issues were addresssed:    1. Heartburn    2. Ulcerative colitis without complications, unspecified location      1. Ulcerative colitis:  She continues to do extremely well.  I recommend that you continue Lialda 4.8 g daily.  She can use Canasa suppositories as needed if symptoms develop.  We will plan for updated labs in April.  We will also check a stool calprotectin level at that time.  We discussed that her ulcerative colitis is not likely to have any significant impact on the IVF.  It is always difficult to know if some of the hormonal changes associated with IVF may precipitate any GI symptoms but if she develops any issue she will let us know    2. Heartburn:  Continue omeprazole and lifestyle modifications..        # IBD specific health maintenance:  Colon cancer surveillance:  Start in 2031    Annual:  - Eye exam:  Not applicable  - Skin exam (if on IMM/TNF):  Not applicable  - reminded pt to use sunblock/hats/sunprotective clothing  - PAP (if immunosuppressed):  Up-to-date    DEXA:  Not applicable    Vitamin D:  Check today    Vaccines:    Influenza:  Up-to-date   Pneumovax:   Discuss in the future   HAV:  Discuss in the future   HBV:  Immune   Tdap:  Up-to-date   MMR:  Immune   VZV:  Immune   HZV:  Not applicable   HPV:  Defer to Gynecology   Meningococcus:  Not applicable   COVID: Vaccinated    Follow up: Follow up in about 6 months (around 7/17/2025).    Visit today is associated with current or anticipated ongoing medical care related to this patient's single serious condition/complex condition (ulcerative colitis).      Thank you again for sending Keisha Mena to see Dr. Dudley Cross today at the [facility]. Please don't hesitate to contact Dr. Cross if there are any questions regarding this evaluation, or if you have any other patients with inflammatory bowel disease for whom you would like a consultation. You can reach Dr. Cross at [phone] or by email at [email or web address]    Jaret Cross MD  Department of Gastroenterology  Inflammatory Bowel Disease

## 2025-01-29 ENCOUNTER — CLINICAL SUPPORT (OUTPATIENT)
Dept: REHABILITATION | Facility: HOSPITAL | Age: 35
End: 2025-01-29
Payer: COMMERCIAL

## 2025-01-29 DIAGNOSIS — R29.3 ABNORMAL POSTURE: ICD-10-CM

## 2025-01-29 DIAGNOSIS — R27.8 COORDINATION ABNORMAL: ICD-10-CM

## 2025-01-29 DIAGNOSIS — R10.2 PELVIC PAIN: Primary | ICD-10-CM

## 2025-01-29 DIAGNOSIS — N94.10 DYSPAREUNIA IN FEMALE: ICD-10-CM

## 2025-01-29 PROCEDURE — 97140 MANUAL THERAPY 1/> REGIONS: CPT | Mod: PN | Performed by: PHYSICAL THERAPIST

## 2025-01-29 PROCEDURE — 97530 THERAPEUTIC ACTIVITIES: CPT | Mod: PN | Performed by: PHYSICAL THERAPIST

## 2025-01-29 NOTE — PROGRESS NOTES
Pelvic Health Physical Therapy   Treatment Note     Name: Keisha Trina  Clinic Number: [id number]    Therapy Diagnosis:   Encounter Diagnoses   Name Primary?    Pelvic pain Yes    Abnormal posture     Dyspareunia in female     Coordination abnormal        Physician: Anna Zacarias MD    Visit Date: 1/29/2025    Physician Orders: PT Eval and Treat PF PT  Medical Diagnosis from Referral: N94.10 (ICD-10-CM) - Dyspareunia in female, M54.50 (ICD-10-CM) - Acute right-sided low back pain, unspecified whether sciatica present M70.61 (ICD-10-CM) - Greater trochanteric bursitis of right hip  Evaluation Date: 10/25/2024  Authorization Period Expiration: 10/7/2025  Plan of Care Expiration: 1/17/2025  Progress Note Due: 12/25/2024  Visit # 13/12 Visits authorized: 2/10  FOTO: 2/3    Cancelled Visits: 0  No Show Visits: 0    Time In: 9:56 AM   Time Out: 10:33 AM   Total Billable Time: 37 minutes    Precautions: Standard    Subjective     Pt reports: The last 3 days has had increased back pain. Her entire back. 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 Just feels uncomfortable. Hips and lower spine was also painful yesterday - better today. Been doing her pelvic wand. Bowels have been good. [place] in 2 weeks.     She was compliant with home exercise program.  Response to previous treatment: improved pain  Functional change: improved pain     Pain in:  0/10  Pain out: 0/10  Location:  pelvic pain       Objective     Keisha received the following manual therapy techniques: to develop flexibility and extensibility for 27 minutes including: trigger point/myofascial release of the Internal PF  with bulbocavernosus approximation and trigger point release to the bilateral levator ani and bilateral bulbocavernosus. Tenderness to palpation with internal PF manual therapy at her left alcock's canal. Soft tissue massage to the left sacrotuberus ligament and sacrospinus ligament.     Keisha participated in dynamic  functional therapeutic activities to improve functional performance for 10 minutes, including: encouraged regular use of her pelvic wand to assist with PF tension at lef alcock's canal.       Intervention 10/29/2024 11/25/2024  12/05/2024  12/12/2024  01/16/2025  01/29/2025    Manual therapy  Right SKTC [x]   trigger point/myofascial release of the Internal PF  with bulbocavernosus approximation and trigger point release to the bilateral levator ani and bilateral bulbocavernosus. Tenderness to palpation with internal PF manual therapy.  trigger point/myofascial release of the Internal PF  with bulbocavernosus approximation and trigger point release to the bilateral levator ani and bilateral bulbocavernosus. Tenderness to palpation with internal PF manual therapy.    trigger point/myofascial release of the Internal PF  with bulbocavernosus approximation and trigger point release to the bilateral levator ani and bilateral bulbocavernosus. Tenderness to palpation with internal PF manual therapy at her left alcock's canal. Soft tissue massage to the left sacrotuberus ligament and sacrospinus ligament.     Right gastroc ROM [x]          Trigger point release to the right psoas [x]         Right hip distraction [x]        PF manual therapy Trigger point release to the bilateral bulbocavernosus  [x] [x]        Trigger point release to the bilateral levator ani  [x] [x]       TherAct     encouraged regular use of her pelvic wand to assist with PF mobility provided with lower extremity home exercise program to compliment pelvic floor mobility. Encouraged intercourse positions to assist with pain with intercourse.   encouraged regular use of her pelvic wand to assist with PF mobility provided with lower extremity home exercise program to compliment pelvic floor mobility. Encouraged intercourse positions to assist with pain with intercourse.  encouraged regular use of her pelvic wand to assist with PF tension at lef alcock's  canal.       Home Exercises Provided and Patient Education Provided     Education provided:   - anatomy/physiology of pelvic floor, posture/body mechanices, fluid intake/dietary modifications, and behavior modifications  Discussed progression of plan of care with patient; educated pt in activity modification; reviewed HEP with pt. Pt demonstrated and verbalized understanding of all instruction and was provided with a handout of HEP (see Patient Instructions).    Written Home Exercises Provided: Patient instructed to cont prior HEP.  Exercises were reviewed and Keisha was able to demonstrate them prior to the end of the session.  Keisha demonstrated good  understanding of the education provided.     See EMR under Patient Instructions for exercises provided 10/29/2024 .    Assessment     Patient with improved pain overall. Good carryover with physical therapist recommendations. Responded well to manual therapy.     Keisha Is progressing well towards her goals.   Pt prognosis is Excellent.     Pt will continue to benefit from skilled outpatient physical therapy to address the deficits listed in the problem list box on initial evaluation, provide pt/family education and to maximize pt's level of independence in the home and community environment.     Pt's spiritual, cultural and educational needs considered and pt agreeable to plan of care and goals.     Anticipated barriers to physical therapy: none     Short Term Goals: 6 weeks   Patient will be independent with pelvic floor down training. Goal met, but with difficulty.  Patient will be able to demonstrate improved pelvic floor relaxation and contraction on rehabilitative ultrasound. Goal met 11/25/2024 - limited pelvic floor mobility  Patient will report regular meditation, diaphragmatic breathing, pelvic floor down training. Goal met 11/25/2024   Patient will report being dry 5/7 days a week. Goal not met - progressing  Patient will be independent with good water intake.  Goal met 11/25/2024   Patient will improve right hip extension to 10*. Goal not met - not assessed     Long Term Goals: 12 weeks   Patient will deny pelvic pressure and UI. Goal not met - progressing  Patient will report improved quality of life and tolerance for activities outside of her home due to improved right hip pain. Goal met 11/25/2024   Patient will demonstrate adequate pelvic floor coordination with contraction and relaxation on rehabilitative ultrasound.  Goal not met - progressing  Patient will be independent with proper core and pelvic floor coordination with progressive strength training.  Goal not met - progressing  Patient will deny right hip pain. Goal not met - progressing  Patient will be independent with progressive home exercise program. Goal not met - progressing    Plan     Progressive bilateral hip strength training and hip flexor ROM. Will await further neuro and ortho recommendations.     Progressive PF manual therapy with use of pelvic wand.     Rafiq Stratton, PT

## 2025-02-07 ENCOUNTER — CLINICAL SUPPORT (OUTPATIENT)
Dept: REHABILITATION | Facility: HOSPITAL | Age: 35
End: 2025-02-07
Payer: COMMERCIAL

## 2025-02-07 DIAGNOSIS — R10.2 PELVIC PAIN: Primary | ICD-10-CM

## 2025-02-07 DIAGNOSIS — N94.10 DYSPAREUNIA IN FEMALE: ICD-10-CM

## 2025-02-07 DIAGNOSIS — R27.8 COORDINATION ABNORMAL: ICD-10-CM

## 2025-02-07 DIAGNOSIS — R29.3 ABNORMAL POSTURE: ICD-10-CM

## 2025-02-07 PROCEDURE — 97530 THERAPEUTIC ACTIVITIES: CPT | Mod: PN | Performed by: PHYSICAL THERAPIST

## 2025-02-07 PROCEDURE — 97140 MANUAL THERAPY 1/> REGIONS: CPT | Mod: PN | Performed by: PHYSICAL THERAPIST

## 2025-02-07 NOTE — PROGRESS NOTES
Outpatient Rehab    Physical Therapy Progress Note : Updated Plan of Care    Patient Name: Keisha Mena  MRN: [id number]  YOB: 1990  Today's Date: 2/7/2025    Therapy Diagnosis:   Encounter Diagnoses   Name Primary?    Pelvic pain Yes    Abnormal posture     Dyspareunia in female     Coordination abnormal      Physician: Anna Zacarias MD    Physician Orders: Eval and Treat  Medical Diagnosis: M54.50 (ICD-10-CM) - Acute right-sided low back pain, unspecified whether sciatica present M70.61 (ICD-10-CM) - Greater trochanteric bursitis of right hip N94.10 (ICD-10-CM) - Dyspareunia in female    Visit # 14/24 Visits Authorized:  3 / 20   Date of Evaluation: 10/25/24  Insurance Authorization Period: 1/16/2025 to 12/31/2025  Plan of Care Certification:  02/07/2025  to 5/2/2025      Time In: 0812   Time Out: 0841  Total Time: 29   Total Billable Time: 29         Subjective   History of Present Illness  Keisha is a 34 y.o. female who reports to physical therapy with a chief concern of Pt states that she is feeling good. Just finished her periods. Had a little pain after being on her feet all day. Has not used her wand with her period being on. Her back is feeling better. Going to [place] next week.. According to the patient's chart, Keisha has a past medical history of Anxiety, Ectopic pregnancy, Female bladder prolapse, and Ulcerative colitis. Keisha has a past surgical history that includes Diagnostic laparoscopy (N/A, 2/7/2021); Laparoscopic salpingectomy (Left, 2/7/2021); Colonoscopy (N/A, 8/29/2023); and Colonoscopy (N/A, 5/2/2024).                History of Present Condition/Illness: Pt states that she is feeling good. Just finished her periods. Had a little pain after being on her feet all day. Has not used her wand with her period being on. Her back is feeling better. Going to [place] next week.    Pain     Patient reports a current pain level of 0/10.                 Objective            Intake Outcome  Measure for FOTO Survey    Therapist reviewed FOTO scores for Keisha Mena on 2/7/2025.   FOTO report - see Media section or FOTO account episode details.     Intake Score:  %  Survey Score 1:  %  Survey Score 2:  %    Treatment:       Manual Therapy  Manual Therapy Activity 1: PF manual therapy with TP release to the bilateral LA.         Therapeutic Activity  Therapeutic Activity 1: Encouraged hip ROM and management of pelvic floor tension with use of her pelvic wand.                   Patient's spiritual, cultural, and educational needs considered and patient agreeable to plan of care and goals.     Assessment & Plan   Assessment  Keisha    Presentation of Symptoms: Stable       Functional Limitations: Activity tolerance, Sexual function, Completing work/school activities, Pain with ADLs/IADLs  Impairments: Abnormal coordination, Abnormal muscle firing, Abnormal muscle tone, Pain with functional activity, Impaired physical strength  Personal Factors Affecting Prognosis: Fear/anxiety, Emotional, Pain    Patient Goal for Therapy (PT): To improve pelvic pain and tension.  Prognosis: Excellent  Assessment Details: Patient with good understanding of physical therapist recommendations and compliance with home exercise program. Benefiting from PF physical therapy to improve pelvic floor mobility and awareness.     Plan  From a physical therapy perspective, the patient would benefit from: Skilled Rehab Services    Planned therapy interventions include: Therapeutic exercise, Therapeutic activities, Manual therapy, and Neuromuscular re-education.    Planned modalities to include: Biofeedback, Radha/anal/urethral biofeedback, Fluidotherapy, and Electrical stimulation - attended.        Visit Frequency: 1 times Per Week for 12 Weeks.  Other/tapered frequency details: Patient to be seen 1x every 2 weeks to 1 month pending complaints of pelvic pain and UI.     This plan was discussed with Patient.   Discussion participants: Agreed  Upon Plan of Care             Goals:   Active       LTG       Patient will deny pelvic pressure and UI.  (Progressing)       Start:  02/07/25    Expected End:  05/02/25            Patient will report improved quality of life and tolerance for activities outside of her home due to improved right hip pain.  (Met)       Start:  02/07/25    Expected End:  05/02/25    Resolved:  02/07/25         Patient will demonstrate adequate pelvic floor coordination with contraction and relaxation on rehabilitative ultrasound.  (Progressing)       Start:  02/07/25    Expected End:  05/02/25            Patient will be independent with proper core and pelvic floor coordination with progressive strength training.  (Progressing)       Start:  02/07/25    Expected End:  05/02/25            Patient will be independent with progressive home exercise program. (Progressing)       Start:  02/07/25    Expected End:  05/02/25               STG       Patient will be independent with pelvic floor down training.  (Met)       Start:  02/07/25    Expected End:  05/02/25    Resolved:  02/07/25         Patient will be able to demonstrate improved pelvic floor relaxation and contraction on rehabilitative ultrasound.  (Met)       Start:  02/07/25    Expected End:  05/02/25    Resolved:  02/07/25         Patient will report regular meditation, diaphragmatic breathing, pelvic floor down training.  (Met)       Start:  02/07/25    Expected End:  05/02/25    Resolved:  02/07/25         Patient will report being dry 5/7 days a week.  (Progressing)       Start:  02/07/25    Expected End:  05/02/25            Patient will improve right hip extension to 10*.  (Met)       Start:  02/07/25    Expected End:  05/02/25    Resolved:  02/07/25             Rafiq Stratton, PT

## 2025-02-21 ENCOUNTER — CLINICAL SUPPORT (OUTPATIENT)
Dept: REHABILITATION | Facility: HOSPITAL | Age: 35
End: 2025-02-21
Payer: COMMERCIAL

## 2025-02-21 DIAGNOSIS — R29.3 ABNORMAL POSTURE: ICD-10-CM

## 2025-02-21 DIAGNOSIS — N94.10 DYSPAREUNIA IN FEMALE: ICD-10-CM

## 2025-02-21 DIAGNOSIS — R27.8 COORDINATION ABNORMAL: ICD-10-CM

## 2025-02-21 DIAGNOSIS — R10.2 PELVIC PAIN: Primary | ICD-10-CM

## 2025-02-21 PROCEDURE — 97140 MANUAL THERAPY 1/> REGIONS: CPT | Mod: PN | Performed by: PHYSICAL THERAPIST

## 2025-02-21 PROCEDURE — 97530 THERAPEUTIC ACTIVITIES: CPT | Mod: PN | Performed by: PHYSICAL THERAPIST

## 2025-02-21 NOTE — PROGRESS NOTES
Outpatient Rehab    Physical Therapy Progress Note   Patient Name: Keisha Mena  MRN: [id number]  YOB: 1990  Today's Date: 2/21/2025    Therapy Diagnosis:   Encounter Diagnoses   Name Primary?    Pelvic pain Yes    Abnormal posture     Dyspareunia in female     Coordination abnormal        Physician: Anna Zacarias MD    Physician Orders: Eval and Treat  Medical Diagnosis: M54.50 (ICD-10-CM) - Acute right-sided low back pain, unspecified whether sciatica present M70.61 (ICD-10-CM) - Greater trochanteric bursitis of right hip N94.10 (ICD-10-CM) - Dyspareunia in female    Visit # 14/24 Visits Authorized:  3 / 20   Date of Evaluation: 10/25/24  Insurance Authorization Period: 1/16/2025 to 12/31/2025  Plan of Care Certification:  02/07/2025  to 5/2/2025      Time In: 0813   Time Out: 0850  Total Time: 37   Total Billable Time: 37         Subjective               Objective            Intake Outcome Measure for FOTO Survey    Therapist reviewed FOTO scores for Keisha Mena on 2/21/2025.   FOTO report - see Media section or FOTO account episode details.     Intake Score:  %  Survey Score 1:  %  Survey Score 2:  %    Treatment:       Manual Therapy  Manual Therapy Activity 1: PF manual therapy with TP release to the bilateral LA. With TTP jesús.  Manual Therapy Activity 2: TP release to the right psoas, sidelying LTR, passive right iliac depression.         Therapeutic Activity  Therapeutic Activity 1: Encrouaged bowel management and body awareness.                   Patient's spiritual, cultural, and educational needs considered and patient agreeable to plan of care and goals.     Assessment & Plan  Tolerated treatment well. Progressing towards goals. Will follow up PRN with physical therapist.          Goals:   Active       LTG       Patient will deny pelvic pressure and UI.  (Progressing)       Start:  02/07/25    Expected End:  05/02/25            Patient will report improved quality of life and tolerance  for activities outside of her home due to improved right hip pain.  (Met)       Start:  02/07/25    Expected End:  05/02/25    Resolved:  02/07/25         Patient will demonstrate adequate pelvic floor coordination with contraction and relaxation on rehabilitative ultrasound.  (Progressing)       Start:  02/07/25    Expected End:  05/02/25            Patient will be independent with proper core and pelvic floor coordination with progressive strength training.  (Progressing)       Start:  02/07/25    Expected End:  05/02/25            Patient will be independent with progressive home exercise program. (Progressing)       Start:  02/07/25    Expected End:  05/02/25               STG       Patient will be independent with pelvic floor down training.  (Met)       Start:  02/07/25    Expected End:  05/02/25    Resolved:  02/07/25         Patient will be able to demonstrate improved pelvic floor relaxation and contraction on rehabilitative ultrasound.  (Met)       Start:  02/07/25    Expected End:  05/02/25    Resolved:  02/07/25         Patient will report regular meditation, diaphragmatic breathing, pelvic floor down training.  (Met)       Start:  02/07/25    Expected End:  05/02/25    Resolved:  02/07/25         Patient will report being dry 5/7 days a week.  (Progressing)       Start:  02/07/25    Expected End:  05/02/25            Patient will improve right hip extension to 10*.  (Met)       Start:  02/07/25    Expected End:  05/02/25    Resolved:  02/07/25             Rafiq Stratton, PT

## 2025-03-17 RX ORDER — MESALAMINE 1.2 G/1
4.8 TABLET, DELAYED RELEASE ORAL
Qty: 120 TABLET | Refills: 5 | Status: SHIPPED | OUTPATIENT
Start: 2025-03-17

## 2025-03-17 NOTE — TELEPHONE ENCOUNTER
"Primary provider: Dr. Jaret Cross    IBD medications: Lialda 4.8 g daily     Refill request for:      Pended Medication(s)   Requested Prescriptions     Pending Prescriptions Disp Refills    mesalamine (LIALDA) 1.2 gram TbEC 120 tablet 5     Sig: Take 4 tablets (4.8 g total) by mouth daily with breakfast.           Allergies reviewed: Yes    Drug Monitoring labs/frequency for all IBD meds:    CBC and CMP every 12 months    Lab Results   Component Value Date    HEPBSAG Non-reactive 09/15/2023    HEPBCAB Non-reactive 09/15/2023     Lab Results   Component Value Date    TBGOLDPLUS Negative 09/15/2023     No results found for: "QUANTNILVALU", "QUANTIFERON", "QUANTTBGDPL"   No results found for: "TSPOTSCREN"  Lab Results   Component Value Date    PGORVBIV18UX 38 09/15/2023    ZCTXZPVR90 661 10/30/2024     Lab Results   Component Value Date    WBC 6.31 04/22/2024    HGB 12.1 04/22/2024    HCT 36.0 (L) 04/22/2024    MCV 94 04/22/2024     04/22/2024     Lab Results   Component Value Date    CREATININE 0.9 04/22/2024    ALBUMIN 4.0 04/22/2024    BILITOT 0.2 04/22/2024    ALKPHOS 59 04/22/2024    AST 19 04/22/2024    ALT 8 (L) 04/22/2024       Lab due date (mo/yr):  4/2025    Labs scheduled: Yes -4/7/2025    Next appt: 7/21/2025    RX refill sent to provider for amount until next labs: Yes       "

## 2025-03-24 NOTE — ANESTHESIA POSTPROCEDURE EVALUATION
Anesthesia Post Evaluation    Patient: Keisha Mena    Procedure(s) Performed: Procedure(s) (LRB):  COLONOSCOPY (N/A)    Final Anesthesia Type: general      Patient location during evaluation: GI PACU  Patient participation: Yes- Able to Participate  Level of consciousness: awake and alert  Post-procedure vital signs: reviewed and stable  Pain management: adequate  Airway patency: patent    PONV status at discharge: No PONV  Anesthetic complications: no      Cardiovascular status: blood pressure returned to baseline and stable  Respiratory status: unassisted, room air and spontaneous ventilation  Hydration status: euvolemic  Follow-up not needed.          Vitals Value Taken Time   /79 08/29/23 1042   Temp 36.1 °C (97 °F) 08/29/23 1011   Pulse 69 08/29/23 1042   Resp 20 08/29/23 1042   SpO2 100 % 08/29/23 1042         Event Time   Out of Recovery 10:46:33         Pain/Rk Score: Rk Score: 10 (8/29/2023 10:14 AM)         Patient called in to clinic and spoke with PSR stating she rescheduled her mmg and ultrasound to  03/27/2025.

## 2025-04-07 ENCOUNTER — LAB VISIT (OUTPATIENT)
Dept: LAB | Facility: HOSPITAL | Age: 35
End: 2025-04-07
Attending: INTERNAL MEDICINE
Payer: COMMERCIAL

## 2025-04-07 ENCOUNTER — TELEPHONE (OUTPATIENT)
Dept: INTERNAL MEDICINE | Facility: CLINIC | Age: 35
End: 2025-04-07
Payer: COMMERCIAL

## 2025-04-07 DIAGNOSIS — K51.90 ULCERATIVE COLITIS WITHOUT COMPLICATIONS, UNSPECIFIED LOCATION: ICD-10-CM

## 2025-04-07 DIAGNOSIS — I10 HYPERTENSION, UNSPECIFIED TYPE: Primary | ICD-10-CM

## 2025-04-07 PROCEDURE — 83993 ASSAY FOR CALPROTECTIN FECAL: CPT | Mod: ,,, | Performed by: INTERNAL MEDICINE

## 2025-04-07 NOTE — TELEPHONE ENCOUNTER
----- Message from Vikki sent at 4/7/2025 11:37 AM CDT -----  Contact: pt  Keisha Taylor: [id number]: 1990PCP: Anuel Fung Phone      [phone] Phone      Not on file.Mobile          [phone]: pt states she is getting ready to go to the hospital to have labs drawn for another doctor. She is asking if Dr. Fung needs anything added. If yes please add so she can have them all drawn at the same time. Please advise Pt states she has eaten today if she needs fasting labs but she states she can go tomorrow if needed [phone]

## 2025-04-08 ENCOUNTER — RESULTS FOLLOW-UP (OUTPATIENT)
Dept: GASTROENTEROLOGY | Facility: CLINIC | Age: 35
End: 2025-04-08

## 2025-04-08 ENCOUNTER — LAB VISIT (OUTPATIENT)
Dept: LAB | Facility: HOSPITAL | Age: 35
End: 2025-04-08
Attending: INTERNAL MEDICINE
Payer: COMMERCIAL

## 2025-04-08 DIAGNOSIS — I10 HYPERTENSION, UNSPECIFIED TYPE: ICD-10-CM

## 2025-04-08 LAB
CHOLEST SERPL-MCNC: 198 MG/DL (ref 120–199)
CHOLEST/HDLC SERPL: 3.7 {RATIO} (ref 2–5)
HDLC SERPL-MCNC: 54 MG/DL (ref 40–75)
HDLC SERPL: 27.3 % (ref 20–50)
LDLC SERPL CALC-MCNC: 131.6 MG/DL (ref 63–159)
NONHDLC SERPL-MCNC: 144 MG/DL
TRIGL SERPL-MCNC: 62 MG/DL (ref 30–150)
TSH SERPL-ACNC: 1.59 UIU/ML (ref 0.4–4)

## 2025-04-08 PROCEDURE — 80061 LIPID PANEL: CPT

## 2025-04-08 PROCEDURE — 36415 COLL VENOUS BLD VENIPUNCTURE: CPT

## 2025-04-08 PROCEDURE — 84443 ASSAY THYROID STIM HORMONE: CPT

## 2025-04-09 LAB
CALPROTECTIN INTERPRETATION (OHS): NORMAL
CALPROTECTIN, STOOL (OHS): 12.3

## 2025-04-14 ENCOUNTER — RESULTS FOLLOW-UP (OUTPATIENT)
Dept: INTERNAL MEDICINE | Facility: CLINIC | Age: 35
End: 2025-04-14

## 2025-04-17 ENCOUNTER — DOCUMENTATION ONLY (OUTPATIENT)
Dept: REHABILITATION | Facility: HOSPITAL | Age: 35
End: 2025-04-17
Payer: COMMERCIAL

## 2025-04-17 NOTE — PROGRESS NOTES
Pelvic Health Physical Therapy   Discharge Summary     Patient Name: Keisha Mena  MRN: [id number]  YOB: 1990  Today's Date: 2/21/2025     Therapy Diagnosis:        Encounter Diagnoses   Name Primary?    Pelvic pain Yes    Abnormal posture      Dyspareunia in female      Coordination abnormal           Physician: Anna Zacarias MD     Physician Orders: Eval and Treat  Medical Diagnosis: M54.50 (ICD-10-CM) - Acute right-sided low back pain, unspecified whether sciatica present M70.61 (ICD-10-CM) - Greater trochanteric bursitis of right hip N94.10 (ICD-10-CM) - Dyspareunia in female     Visit # 14/24 Visits Authorized:  3 / 20   Date of Evaluation: 10/25/24  Insurance Authorization Period: 1/16/2025 to 12/31/2025  Plan of Care Certification:  02/07/2025  to 5/2/2025       Assessment     Goals met.     LTG         Patient will deny pelvic pressure and UI.  (Progressing)         Start:  02/07/25    Expected End:  05/02/25              Patient will report improved quality of life and tolerance for activities outside of her home due to improved right hip pain.  (Met)         Start:  02/07/25    Expected End:  05/02/25    Resolved:  02/07/25           Patient will demonstrate adequate pelvic floor coordination with contraction and relaxation on rehabilitative ultrasound.  (Progressing)         Start:  02/07/25    Expected End:  05/02/25              Patient will be independent with proper core and pelvic floor coordination with progressive strength training.  (Progressing)         Start:  02/07/25    Expected End:  05/02/25              Patient will be independent with progressive home exercise program. (Progressing)         Start:  02/07/25    Expected End:  05/02/25                 STG         Patient will be independent with pelvic floor down training.  (Met)         Start:  02/07/25    Expected End:  05/02/25    Resolved:  02/07/25           Patient will be able to demonstrate improved pelvic floor  relaxation and contraction on rehabilitative ultrasound.  (Met)         Start:  02/07/25    Expected End:  05/02/25    Resolved:  02/07/25           Patient will report regular meditation, diaphragmatic breathing, pelvic floor down training.  (Met)         Start:  02/07/25    Expected End:  05/02/25    Resolved:  02/07/25           Patient will report being dry 5/7 days a week.  (Progressing)         Start:  02/07/25    Expected End:  05/02/25              Patient will improve right hip extension to 10*.  (Met)         Start:  02/07/25    Expected End:  05/02/25    Resolved:  02/07/25             Plan     Discharge physical therapy at this time.     Rafiq Stratton, PT

## 2025-06-02 ENCOUNTER — OFFICE VISIT (OUTPATIENT)
Dept: NEUROLOGY | Facility: CLINIC | Age: 35
End: 2025-06-02
Payer: COMMERCIAL

## 2025-06-02 VITALS
WEIGHT: 181.88 LBS | BODY MASS INDEX: 34.34 KG/M2 | HEIGHT: 61 IN | SYSTOLIC BLOOD PRESSURE: 122 MMHG | DIASTOLIC BLOOD PRESSURE: 68 MMHG

## 2025-06-02 DIAGNOSIS — R20.2 BILATERAL ARM NUMBNESS AND TINGLING WHILE SLEEPING: Primary | ICD-10-CM

## 2025-06-02 DIAGNOSIS — M25.50 ARTHRALGIA, UNSPECIFIED JOINT: ICD-10-CM

## 2025-06-02 DIAGNOSIS — G43.009 MIGRAINE WITHOUT AURA AND WITHOUT STATUS MIGRAINOSUS, NOT INTRACTABLE: ICD-10-CM

## 2025-06-02 DIAGNOSIS — R20.0 BILATERAL ARM NUMBNESS AND TINGLING WHILE SLEEPING: Primary | ICD-10-CM

## 2025-06-02 PROCEDURE — 99214 OFFICE O/P EST MOD 30 MIN: CPT | Mod: S$GLB,,, | Performed by: PSYCHIATRY & NEUROLOGY

## 2025-06-02 PROCEDURE — 3074F SYST BP LT 130 MM HG: CPT | Mod: CPTII,S$GLB,, | Performed by: PSYCHIATRY & NEUROLOGY

## 2025-06-02 PROCEDURE — 3078F DIAST BP <80 MM HG: CPT | Mod: CPTII,S$GLB,, | Performed by: PSYCHIATRY & NEUROLOGY

## 2025-06-02 PROCEDURE — 3008F BODY MASS INDEX DOCD: CPT | Mod: CPTII,S$GLB,, | Performed by: PSYCHIATRY & NEUROLOGY

## 2025-06-02 PROCEDURE — 99999 PR PBB SHADOW E&M-EST. PATIENT-LVL III: CPT | Mod: PBBFAC,,, | Performed by: PSYCHIATRY & NEUROLOGY

## 2025-06-02 PROCEDURE — 1160F RVW MEDS BY RX/DR IN RCRD: CPT | Mod: CPTII,S$GLB,, | Performed by: PSYCHIATRY & NEUROLOGY

## 2025-06-02 PROCEDURE — 1159F MED LIST DOCD IN RCRD: CPT | Mod: CPTII,S$GLB,, | Performed by: PSYCHIATRY & NEUROLOGY

## 2025-06-17 DIAGNOSIS — I10 HYPERTENSION, UNSPECIFIED TYPE: ICD-10-CM

## 2025-06-17 RX ORDER — LABETALOL 100 MG/1
TABLET, FILM COATED ORAL
Qty: 180 TABLET | Refills: 1 | Status: SHIPPED | OUTPATIENT
Start: 2025-06-17

## 2025-06-17 NOTE — TELEPHONE ENCOUNTER
No care due was identified.  [facility] Embedded Care Due Messages. Reference number: [id number].   6/17/2025 8:08:22 AM CDT

## 2025-06-17 NOTE — TELEPHONE ENCOUNTER
Refill Decision Note   Keisha Trina  is requesting a refill authorization.    Brief Assessment and Rationale for Refill:   Approve       Medication Therapy Plan:         Comments:     Note composed:9:58 AM 06/17/2025

## 2025-07-21 ENCOUNTER — OFFICE VISIT (OUTPATIENT)
Dept: GASTROENTEROLOGY | Facility: CLINIC | Age: 35
End: 2025-07-21
Payer: COMMERCIAL

## 2025-07-21 VITALS
HEART RATE: 65 BPM | BODY MASS INDEX: 33.55 KG/M2 | OXYGEN SATURATION: 98 % | TEMPERATURE: 98 F | HEIGHT: 61 IN | DIASTOLIC BLOOD PRESSURE: 90 MMHG | SYSTOLIC BLOOD PRESSURE: 129 MMHG | WEIGHT: 177.69 LBS

## 2025-07-21 DIAGNOSIS — K21.9 GASTROESOPHAGEAL REFLUX DISEASE WITHOUT ESOPHAGITIS: Primary | ICD-10-CM

## 2025-07-21 DIAGNOSIS — K51.311 ULCERATIVE RECTOSIGMOIDITIS WITH RECTAL BLEEDING: ICD-10-CM

## 2025-07-21 PROCEDURE — 1159F MED LIST DOCD IN RCRD: CPT | Mod: CPTII,S$GLB,, | Performed by: INTERNAL MEDICINE

## 2025-07-21 PROCEDURE — 3008F BODY MASS INDEX DOCD: CPT | Mod: CPTII,S$GLB,, | Performed by: INTERNAL MEDICINE

## 2025-07-21 PROCEDURE — 99999 PR PBB SHADOW E&M-EST. PATIENT-LVL IV: CPT | Mod: PBBFAC,,, | Performed by: INTERNAL MEDICINE

## 2025-07-21 PROCEDURE — 3080F DIAST BP >= 90 MM HG: CPT | Mod: CPTII,S$GLB,, | Performed by: INTERNAL MEDICINE

## 2025-07-21 PROCEDURE — 3074F SYST BP LT 130 MM HG: CPT | Mod: CPTII,S$GLB,, | Performed by: INTERNAL MEDICINE

## 2025-07-21 PROCEDURE — G2211 COMPLEX E/M VISIT ADD ON: HCPCS | Mod: S$GLB,,, | Performed by: INTERNAL MEDICINE

## 2025-07-21 PROCEDURE — 99213 OFFICE O/P EST LOW 20 MIN: CPT | Mod: S$GLB,,, | Performed by: INTERNAL MEDICINE

## 2025-07-21 PROCEDURE — 1160F RVW MEDS BY RX/DR IN RCRD: CPT | Mod: CPTII,S$GLB,, | Performed by: INTERNAL MEDICINE

## 2025-07-21 NOTE — PROGRESS NOTES
[facility]          Inflammatory Bowel Disease Follow Up Note         TODAY'S VISIT DATE:  7/21/2025    Reason for Consult:    Chief Complaint   Patient presents with    Follow-up    Ulcerative Colitis       PCP: Lisa Fung      Referring MD:   No ref. provider found    History of Present Illness:  Keisha Mena who is a 34 y.o. female is being seen today at the [facility] on 07/21/2025 for inflammatory bowel disease- ulcerative colitis.  She continues to do very well.  She is typically having 1 formed bowel movement daily with no blood in his stools and no abdominal pain.  If she misses a dose of her Lialda she will notice an increase in stool frequency and some urgency issues.  She is taking Lialda 2.4 g twice daily.  She did not tolerate 4.8 g once daily because of headaches.  She denies any other complaints.  In April she had labs done that were normal and a calprotectin level that was also normal.  She is going to be starting IVF next month.      IBD History:  In early August she began having rectal pain with bowel movements as well as tenesmus and urgency.  She saw primary care doctor it was initially concern for possible perianal abscess but none was ever identified.  She was treated with antibiotics and took this for about 7 days.  About 2 weeks after her initial presentation she developed a fever and went to the emergency room.  A CT scan at that time showed mild wall thickening of the rectum concerning for possible proctitis.  She was subsequently seen by Colorectal surgery and a colonoscopy was scheduled.  Her colonoscopy occurred on August 29, 2023.  At that time she was found to have inflammatory changes of the rectum, sigmoid colon, and descending colon.  The remainder of the colon was normal.  Biopsy showed active inflammation but no definitive chronic changes.  She was given sulfasalazine 500 mg twice daily and reports that while  "taking this she continued to feel bad and actually developed palpitations, headaches, and ear pain.  She stopped taking it after a few days.  In September 2023 we started Canasa suppositories daily and Lialda 2.4 g daily.  In January of 2024 she was noting some improvement but still having some blood in his stools intermittently.  We decided to increase the dose of Lialda 4.8 g daily and continue Canasa suppositories.  A follow-up colonoscopy in May of 2024 showed no evidence of endoscopic activity.  Biopsy showed no active inflammation.  We decided to stop the Canasa suppositories at that time.  A follow-up calprotectin level in April of 2025 was normal.    IBD Details:  Dx Date:  August 2023  Disease type/distribution:  Ulcerative colitis/involvement from the rectum to the descending colon  Current Treatment:  Lialda 4.8 g daily Start Date:  September 2023 Response:  Endoscopic and histologic remission  Optimized:  Yes Adverse reactions:  None  Prior surgeries:  None  CRP Elevation:  Not elevated  calprotectin:  Low at baseline  Disease Complications:  None  Extraintestinal manifestations:  None  Prior treatments:   Steroids:  None  5ASA:  Currently on Canasa and Lialda  IMM:  None  TNF Inh:  None   Anti-Integrin:  None   IL 12/23:  None  ALONSO Inh:  None    Previous Clinical Trials:  None    Last Colonoscopy:   May 2, 2024-normal colon-biopsies with no active inflammation  August 2023-inflammatory changes involving the rectum, sigmoid, descending colon, otherwise normal    Other Endoscopies:  None    Imaging:   MRE:  None   CT:  August 2023-rectal thickening concerning for likely proctitis   Other:  No other pertinent imaging    Pertinent Labs:  Lab Results   Component Value Date    SEDRATE 4 10/30/2024    CRP 0.5 04/07/2025     No results found for: "TTGIGA", "IGA"  Lab Results   Component Value Date    TSH 1.588 04/08/2025    FREET4 1.02 04/22/2024     Lab Results   Component Value Date    DSAMNIFH69XQ 38 " "09/15/2023    TAIBGYOH86 661 10/30/2024     Lab Results   Component Value Date    HEPBSAG Non-reactive 09/15/2023    HEPBCAB Non-reactive 09/15/2023    HEPCAB Non-reactive 09/15/2023     Lab Results   Component Value Date    JCP51BMXI Negative 04/04/2019     Lab Results   Component Value Date    NIL 0.06586 09/15/2023    MITOGENNIL 9.967 09/15/2023     Lab Results   Component Value Date    TPTMINTERP SEE BELOW 09/15/2023     No results found for: "STOOLCULTURE", "XXUPIUHJVL8U", "IMYTSUCLAR0A", "CDIFFICILEAN", "CDIFFTOX", "CDIFFICILEBY"  Lab Results   Component Value Date    CALPROTECTIN 12.3 04/07/2025       Therapeutic Drug Monitoring Labs:  No results found for: "PROMETH"  No results found for: "ANSADAINIT", "INFLIXIMAB", "INFLIXINTERP"    Vaccinations:  No results found for: "HEPBSAB"  Lab Results   Component Value Date    HEPAIGG Non-reactive 09/15/2023     Lab Results   Component Value Date    VARICELLAZOS 1982.00 09/15/2023    VARICELLAINT Positive 09/15/2023     Immunization History   Administered Date(s) Administered    COVID-19, MRNA, LN-S, PF (Pfizer) (Purple Cap) 12/18/2020, 01/08/2021, 12/06/2021    COVID-19, mRNA, LNP-S, bivalent booster, PF (PFIZER OMICRON) 11/11/2022    DTaP 1988, 03/21/1989, 06/18/1989, 03/20/1990    HIB 07/25/1990    IPV 1988, 03/21/1989, 03/20/1990    Influenza - Quadrivalent 01/05/2015    Influenza - Quadrivalent - PF *Preferred* (6 months and older) 09/11/2019    Influenza - Trivalent - Fluarix, Flulaval, Fluzone, Afluria - PF 01/03/2025    MMR 03/20/1990    PPD Test 04/22/2016    Tdap 06/27/2016, 09/11/2019         Review of Systems  Review of Systems   Constitutional:  Negative for chills, fever and weight loss.   HENT:  Negative for sore throat.    Eyes:  Negative for pain, discharge and redness.   Respiratory:  Negative for cough, shortness of breath and wheezing.    Cardiovascular:  Negative for chest pain, orthopnea and leg swelling.   Gastrointestinal:  " Negative for abdominal pain, blood in stool, constipation, diarrhea, heartburn, melena, nausea and vomiting.   Genitourinary:  Negative for dysuria, frequency and urgency.   Musculoskeletal:  Negative for back pain, joint pain and myalgias.   Skin:  Negative for itching and rash.   Neurological:  Negative for focal weakness and seizures.   Endo/Heme/Allergies:  Does not bruise/bleed easily.   Psychiatric/Behavioral:  Negative for depression. The patient is not nervous/anxious.        Medical History:   Past Medical History:   Diagnosis Date    Anxiety     Ectopic pregnancy 02/06/2021    Female bladder prolapse     Ulcerative colitis 2023       Surgical History:  Past Surgical History:   Procedure Laterality Date    COLONOSCOPY N/A 8/29/2023    Procedure: COLONOSCOPY;  Surgeon: González Fernandez MD;  Location: [facility] ENDO (4TH FLR);  Service: Colon and Rectal;  Laterality: N/A;  Referred by: Dr. Wang  Prep: PEG  Route instructions sent: portal  Other concerns: FYI Pt seen by Dr. Wang today with notes that state: Plan  -colonoscopy within a week  -continue taking cipro/flagyl  - concern for ulcerative proctitis  SW    COLONOSCOPY N/A 5/2/2024    Procedure: COLONOSCOPY;  Surgeon: Jaret Cross MD;  Location: [facility] ENDO (4TH FLR);  Service: Endoscopy;  Laterality: N/A;  Ref By:joann Beauchamp sent via portal,PEG.  4/30/24- [facility] for precall - ERW    DIAGNOSTIC LAPAROSCOPY N/A 2/7/2021    Procedure: LAPAROSCOPY, DIAGNOSTIC;  Surgeon: Tea Schneider MD;  Location: [facility] OR;  Service: OB/GYN;  Laterality: N/A;    LAPAROSCOPIC SALPINGECTOMY Left 2/7/2021    Procedure: SALPINGECTOMY, LAPAROSCOPIC;  Surgeon: Tea Schneider MD;  Location: [facility] OR;  Service: OB/GYN;  Laterality: Left;       Family History:   Family History   Problem Relation Name Age of Onset    Hypertension Mother      Alcohol abuse Father          history of s/p liver transplant    Liver cancer Father      Breast cancer Neg Hx       "Colon cancer Neg Hx      Ovarian cancer Neg Hx         Social History:   Social History     Tobacco Use    Smoking status: Never     Passive exposure: Never    Smokeless tobacco: Never   Substance Use Topics    Alcohol use: Yes     Comment: rarely    Drug use: Never       Allergies: Reviewed    Home Medications:   Medication List with Changes/Refills   Current Medications    ACETAMINOPHEN (TYLENOL) 500 MG TABLET    Take 500 mg by mouth every 6 (six) hours as needed for Pain.    CLOMIPHENE (CLOMID) 50 MG TABLET    Take two tablets by mouth at bedtime for 5 nights    DEXAMETHASONE (DECADRON) 1 MG TAB    Take one tablet by mouth once daily    DOCOSAHEXAENOIC ACID/EPA (FISH OIL ORAL)    Take 700 mg by mouth once daily.    ESTRADIOL (ESTRACE) 2 MG TABLET    Take two tablets by mouth once daily    GABAPENTIN (NEURONTIN) 100 MG CAPSULE    Take 1 capsule (100 mg total) by mouth every evening.    LABETALOL (NORMODYNE) 100 MG TABLET    TAKE 1 TABLET BY MOUTH TWICE A DAY    MESALAMINE (CANASA) 1000 MG SUPP    Place 1 suppository (1,000 mg total) rectally nightly.    MESALAMINE (LIALDA) 1.2 GRAM TBEC    Take 4 tablets (4.8 g total) by mouth daily with breakfast.    OMEPRAZOLE (PRILOSEC) 20 MG CAPSULE    Take 1 capsule (20 mg total) by mouth every morning.    PNV COMB12/IRON CB/FA1/DSS/DHA (PRENATAL 12-IRON-FA1-DSS-DHA ORAL)    Take by mouth once daily.       Physical Exam:  Vital Signs:  BP (!) 129/90 (BP Location: Left arm, Patient Position: Sitting)   Pulse 65   Temp 98.1 °F (36.7 °C)   Ht 5' 1" (1.549 m)   Wt 80.6 kg (177 lb 11.1 oz)   SpO2 98%   BMI 33.57 kg/m²   Body mass index is 33.57 kg/m².    Physical Exam  Vitals and nursing note reviewed.   Constitutional:       General: She is not in acute distress.     Appearance: Normal appearance. She is well-developed. She is not ill-appearing or toxic-appearing.   Eyes:      Conjunctiva/sclera: Conjunctivae normal.      Pupils: Pupils are equal, round, and reactive to " light.   Neck:      Thyroid: No thyromegaly.   Cardiovascular:      Rate and Rhythm: Normal rate and regular rhythm.      Heart sounds: Normal heart sounds. No murmur heard.  Pulmonary:      Effort: Pulmonary effort is normal.      Breath sounds: Normal breath sounds. No wheezing or rales.   Abdominal:      General: Bowel sounds are normal. There is no distension.      Palpations: Abdomen is soft. There is no mass.      Tenderness: There is no abdominal tenderness.   Musculoskeletal:         General: No tenderness. Normal range of motion.      Right lower leg: No edema.      Left lower leg: No edema.   Lymphadenopathy:      Cervical: No cervical adenopathy.   Skin:     Findings: No erythema or rash.   Neurological:      General: No focal deficit present.      Mental Status: She is alert and oriented to person, place, and time.   Psychiatric:         Mood and Affect: Mood normal.         Behavior: Behavior normal.         Thought Content: Thought content normal.         Judgment: Judgment normal.         Labs: reviewed and pertinent noted above    Assessment/Plan:  Keisha Mena is a 34 y.o. female with left-sided ulcerative colitis. The following issues were addresssed:    1. Gastroesophageal reflux disease without esophagitis    2. Ulcerative rectosigmoiditis with rectal bleeding      1. Ulcerative colitis:  She is doing very well.  Recommend continuing Lialda 4.8 g daily.  Plan to update labs in check stool calprotectin level in April of next year.  We will plan for a six-month nurse visit and follow up in the office in 1 year.  Informed that Lialda is safe during pregnancy.    2. Heartburn:  Continue omeprazole and lifestyle modifications.  Symptoms under good control.  Continue to monitor.  We discussed that omeprazole is safe during pregnancy.        # IBD specific health maintenance:  Colon cancer surveillance:  Start in 2031    Annual:  - Eye exam:  Not applicable  - Skin exam (if on IMM/TNF):  Not applicable  -  reminded pt to use sunblock/hats/sunprotective clothing  - PAP (if immunosuppressed):  Up-to-date    DEXA:  Not applicable    Vitamin D:  Check today    Vaccines:    Influenza:  Recommend flu shot when available   Pneumovax:  Discuss in the future   HAV:  Discuss in the future   HBV:  Immune   Tdap:  Up-to-date   MMR:  Immune   VZV:  Immune   HZV:  Not applicable   HPV:  Defer to Gynecology   Meningococcus:  Not applicable   COVID: Vaccinated    Follow up: Follow up in about 1 year (around 7/21/2026).    Visit today is associated with current or anticipated ongoing medical care related to this patient's single serious condition/complex condition (ulcerative colitis).      Thank you again for sending Keisha Mena to see Dr. Dudley Cross today at the [facility]. Please don't hesitate to contact Dr. Cross if there are any questions regarding this evaluation, or if you have any other patients with inflammatory bowel disease for whom you would like a consultation. You can reach Dr. Cross at [phone] or by email at [email or web address]    Jaret Cross MD  Department of Gastroenterology  Inflammatory Bowel Disease

## 2025-08-04 ENCOUNTER — PATIENT MESSAGE (OUTPATIENT)
Dept: ADMINISTRATIVE | Facility: HOSPITAL | Age: 35
End: 2025-08-04
Payer: COMMERCIAL

## 2025-08-15 ENCOUNTER — TELEPHONE (OUTPATIENT)
Dept: GASTROENTEROLOGY | Facility: CLINIC | Age: 35
End: 2025-08-15
Payer: COMMERCIAL

## 2025-08-19 ENCOUNTER — OFFICE VISIT (OUTPATIENT)
Dept: INTERNAL MEDICINE | Facility: CLINIC | Age: 35
End: 2025-08-19
Payer: COMMERCIAL

## 2025-08-19 VITALS
SYSTOLIC BLOOD PRESSURE: 122 MMHG | DIASTOLIC BLOOD PRESSURE: 90 MMHG | WEIGHT: 177.25 LBS | BODY MASS INDEX: 33.47 KG/M2 | OXYGEN SATURATION: 99 % | HEART RATE: 74 BPM | RESPIRATION RATE: 18 BRPM | HEIGHT: 61 IN

## 2025-08-19 DIAGNOSIS — I10 HYPERTENSION, UNSPECIFIED TYPE: Primary | ICD-10-CM

## 2025-08-19 DIAGNOSIS — M77.31 CALCANEAL SPUR OF RIGHT FOOT: ICD-10-CM

## 2025-08-19 PROCEDURE — 3008F BODY MASS INDEX DOCD: CPT | Mod: CPTII,S$GLB,, | Performed by: NURSE PRACTITIONER

## 2025-08-19 PROCEDURE — 1160F RVW MEDS BY RX/DR IN RCRD: CPT | Mod: CPTII,S$GLB,, | Performed by: NURSE PRACTITIONER

## 2025-08-19 PROCEDURE — 99999 PR PBB SHADOW E&M-EST. PATIENT-LVL IV: CPT | Mod: PBBFAC,,, | Performed by: NURSE PRACTITIONER

## 2025-08-19 PROCEDURE — 3074F SYST BP LT 130 MM HG: CPT | Mod: CPTII,S$GLB,, | Performed by: NURSE PRACTITIONER

## 2025-08-19 PROCEDURE — 3080F DIAST BP >= 90 MM HG: CPT | Mod: CPTII,S$GLB,, | Performed by: NURSE PRACTITIONER

## 2025-08-19 PROCEDURE — 99214 OFFICE O/P EST MOD 30 MIN: CPT | Mod: S$GLB,,, | Performed by: NURSE PRACTITIONER

## 2025-08-19 PROCEDURE — 1159F MED LIST DOCD IN RCRD: CPT | Mod: CPTII,S$GLB,, | Performed by: NURSE PRACTITIONER

## 2025-08-24 DIAGNOSIS — K21.9 GASTROESOPHAGEAL REFLUX DISEASE WITHOUT ESOPHAGITIS: ICD-10-CM

## 2025-08-25 RX ORDER — OMEPRAZOLE 20 MG/1
20 CAPSULE, DELAYED RELEASE ORAL EVERY MORNING
Qty: 90 CAPSULE | Refills: 3 | Status: SHIPPED | OUTPATIENT
Start: 2025-08-25 | End: 2026-08-25

## (undated) DEVICE — DISSECTOR SONICISION CRV 39CM

## (undated) DEVICE — SLEEVE KII ADV FIX 11X100MM

## (undated) DEVICE — NDL INSUF ULTRA VERESS 120MM

## (undated) DEVICE — SUT VICRYL CTD 3-0 PS-1 18

## (undated) DEVICE — TROCAR LAPSCP KII SZ 11 10CM

## (undated) DEVICE — PACK LAPAROSCOPY

## (undated) DEVICE — SOL NS 1000CC

## (undated) DEVICE — BAG TISS RETRV MONARCH 10MM

## (undated) DEVICE — TROCAR KII FIOS 11MM X 100MM

## (undated) DEVICE — SEE MEDLINE ITEM 157117

## (undated) DEVICE — SOL CLEARIFY VISUALIZATION LAP

## (undated) DEVICE — ELECTRODE REM PLYHSV RETURN 9

## (undated) DEVICE — Device

## (undated) DEVICE — IRRIGATOR ENDOSCOPY DISP.

## (undated) DEVICE — SUT CTD VICRYL 3-0 PS-1